# Patient Record
Sex: FEMALE | Race: WHITE | NOT HISPANIC OR LATINO | Employment: OTHER | URBAN - METROPOLITAN AREA
[De-identification: names, ages, dates, MRNs, and addresses within clinical notes are randomized per-mention and may not be internally consistent; named-entity substitution may affect disease eponyms.]

---

## 2018-12-14 ENCOUNTER — TRANSCRIBE ORDERS (OUTPATIENT)
Dept: LAB | Facility: HOSPITAL | Age: 83
End: 2018-12-14

## 2018-12-14 ENCOUNTER — APPOINTMENT (OUTPATIENT)
Dept: LAB | Facility: HOSPITAL | Age: 83
End: 2018-12-14

## 2018-12-14 DIAGNOSIS — B96.89 BACTERIAL CHOLANGITIS: ICD-10-CM

## 2018-12-14 DIAGNOSIS — Z79.52 LONG TERM CURRENT USE OF SYSTEMIC STEROIDS: ICD-10-CM

## 2018-12-14 DIAGNOSIS — K83.09 BACTERIAL CHOLANGITIS: ICD-10-CM

## 2018-12-14 DIAGNOSIS — L03.115 CELLULITIS OF RIGHT FOOT: ICD-10-CM

## 2018-12-14 DIAGNOSIS — S80.821D: Primary | ICD-10-CM

## 2018-12-14 DIAGNOSIS — M05.79 SEROPOSITIVE RHEUMATOID ARTHRITIS OF MULTIPLE SITES (HCC): ICD-10-CM

## 2018-12-14 DIAGNOSIS — B95.7 CHRONIC GRANULOMATOUS INFECTION DUE MOSTLY TO STAPHYLOCOCCUS AUREUS: ICD-10-CM

## 2018-12-14 DIAGNOSIS — N39.0 URINARY TRACT INFECTION WITHOUT HEMATURIA, SITE UNSPECIFIED: ICD-10-CM

## 2018-12-14 DIAGNOSIS — B96.29 NON-SHIGA TOXIN-PRODUCING E. COLI: ICD-10-CM

## 2018-12-14 DIAGNOSIS — R60.0 LOCALIZED EDEMA: ICD-10-CM

## 2018-12-14 DIAGNOSIS — M35.3 POLYMYALGIA RHEUMATICA (HCC): ICD-10-CM

## 2018-12-14 LAB
ALBUMIN SERPL-MCNC: 3.99 G/DL (ref 3.2–4.8)
ALBUMIN/GLOB SERPL: 2.1 G/DL (ref 1.5–2.5)
ALP SERPL-CCNC: 60 U/L (ref 25–100)
ALT SERPL W P-5'-P-CCNC: 66 U/L (ref 7–40)
ANION GAP SERPL CALCULATED.3IONS-SCNC: 8 MMOL/L (ref 3–11)
AST SERPL-CCNC: 47 U/L (ref 0–33)
BASOPHILS # BLD AUTO: 0.02 10*3/MM3 (ref 0–0.2)
BASOPHILS NFR BLD AUTO: 0.3 % (ref 0–1)
BILIRUB SERPL-MCNC: 0.5 MG/DL (ref 0.3–1.2)
BUN BLD-MCNC: 16 MG/DL (ref 9–23)
BUN/CREAT SERPL: 18.8 (ref 7–25)
CALCIUM SPEC-SCNC: 9 MG/DL (ref 8.7–10.4)
CHLORIDE SERPL-SCNC: 106 MMOL/L (ref 99–109)
CO2 SERPL-SCNC: 27 MMOL/L (ref 20–31)
CREAT BLD-MCNC: 0.85 MG/DL (ref 0.6–1.3)
DEPRECATED RDW RBC AUTO: 52.9 FL (ref 37–54)
EOSINOPHIL # BLD AUTO: 0.09 10*3/MM3 (ref 0–0.3)
EOSINOPHIL NFR BLD AUTO: 1.4 % (ref 0–3)
ERYTHROCYTE [DISTWIDTH] IN BLOOD BY AUTOMATED COUNT: 15.2 % (ref 11.3–14.5)
GFR SERPL CREATININE-BSD FRML MDRD: 64 ML/MIN/1.73
GLOBULIN UR ELPH-MCNC: 1.9 GM/DL
GLUCOSE BLD-MCNC: 116 MG/DL (ref 70–100)
HCT VFR BLD AUTO: 42.8 % (ref 34.5–44)
HGB BLD-MCNC: 13.7 G/DL (ref 11.5–15.5)
IMM GRANULOCYTES # BLD: 0.01 10*3/MM3 (ref 0–0.03)
IMM GRANULOCYTES NFR BLD: 0.2 % (ref 0–0.6)
LYMPHOCYTES # BLD AUTO: 0.81 10*3/MM3 (ref 0.6–4.8)
LYMPHOCYTES NFR BLD AUTO: 12.3 % (ref 24–44)
MCH RBC QN AUTO: 30.3 PG (ref 27–31)
MCHC RBC AUTO-ENTMCNC: 32 G/DL (ref 32–36)
MCV RBC AUTO: 94.7 FL (ref 80–99)
MONOCYTES # BLD AUTO: 0.85 10*3/MM3 (ref 0–1)
MONOCYTES NFR BLD AUTO: 12.9 % (ref 0–12)
NEUTROPHILS # BLD AUTO: 4.81 10*3/MM3 (ref 1.5–8.3)
NEUTROPHILS NFR BLD AUTO: 73.1 % (ref 41–71)
PLATELET # BLD AUTO: 154 10*3/MM3 (ref 150–450)
PMV BLD AUTO: 9.7 FL (ref 6–12)
POTASSIUM BLD-SCNC: 3.4 MMOL/L (ref 3.5–5.5)
PROT SERPL-MCNC: 5.9 G/DL (ref 5.7–8.2)
RBC # BLD AUTO: 4.52 10*6/MM3 (ref 3.89–5.14)
SODIUM BLD-SCNC: 141 MMOL/L (ref 132–146)
WBC NRBC COR # BLD: 6.58 10*3/MM3 (ref 3.5–10.8)

## 2018-12-14 PROCEDURE — 36415 COLL VENOUS BLD VENIPUNCTURE: CPT | Performed by: INTERNAL MEDICINE

## 2018-12-14 PROCEDURE — 85025 COMPLETE CBC W/AUTO DIFF WBC: CPT | Performed by: INTERNAL MEDICINE

## 2018-12-14 PROCEDURE — 80053 COMPREHEN METABOLIC PANEL: CPT | Performed by: INTERNAL MEDICINE

## 2018-12-17 ENCOUNTER — LAB (OUTPATIENT)
Dept: LAB | Facility: HOSPITAL | Age: 83
End: 2018-12-17

## 2018-12-17 ENCOUNTER — TRANSCRIBE ORDERS (OUTPATIENT)
Dept: LAB | Facility: HOSPITAL | Age: 83
End: 2018-12-17

## 2018-12-17 DIAGNOSIS — L03.115 CELLULITIS OF RIGHT FOOT: ICD-10-CM

## 2018-12-17 DIAGNOSIS — B96.89 BACTERIAL CHOLANGITIS: ICD-10-CM

## 2018-12-17 DIAGNOSIS — K83.09 BACTERIAL CHOLANGITIS: ICD-10-CM

## 2018-12-17 DIAGNOSIS — R60.0 LOCALIZED EDEMA: ICD-10-CM

## 2018-12-17 DIAGNOSIS — S80.821D: Primary | ICD-10-CM

## 2018-12-17 DIAGNOSIS — S80.821D: ICD-10-CM

## 2018-12-17 LAB
ALBUMIN SERPL-MCNC: 4.15 G/DL (ref 3.2–4.8)
ALBUMIN/GLOB SERPL: 2 G/DL (ref 1.5–2.5)
ALP SERPL-CCNC: 66 U/L (ref 25–100)
ALT SERPL W P-5'-P-CCNC: 42 U/L (ref 7–40)
ANION GAP SERPL CALCULATED.3IONS-SCNC: 10 MMOL/L (ref 3–11)
AST SERPL-CCNC: 23 U/L (ref 0–33)
BASOPHILS # BLD AUTO: 0.03 10*3/MM3 (ref 0–0.2)
BASOPHILS NFR BLD AUTO: 0.5 % (ref 0–1)
BILIRUB SERPL-MCNC: 0.5 MG/DL (ref 0.3–1.2)
BUN BLD-MCNC: 20 MG/DL (ref 9–23)
BUN/CREAT SERPL: 21.3 (ref 7–25)
CALCIUM SPEC-SCNC: 9.1 MG/DL (ref 8.7–10.4)
CHLORIDE SERPL-SCNC: 107 MMOL/L (ref 99–109)
CO2 SERPL-SCNC: 24 MMOL/L (ref 20–31)
CREAT BLD-MCNC: 0.94 MG/DL (ref 0.6–1.3)
CRP SERPL-MCNC: 0.99 MG/DL (ref 0–1)
DEPRECATED RDW RBC AUTO: 53.1 FL (ref 37–54)
EOSINOPHIL # BLD AUTO: 0.11 10*3/MM3 (ref 0–0.3)
EOSINOPHIL NFR BLD AUTO: 1.7 % (ref 0–3)
ERYTHROCYTE [DISTWIDTH] IN BLOOD BY AUTOMATED COUNT: 15.1 % (ref 11.3–14.5)
ERYTHROCYTE [SEDIMENTATION RATE] IN BLOOD: <1 MM/HR (ref 0–30)
GFR SERPL CREATININE-BSD FRML MDRD: 57 ML/MIN/1.73
GLOBULIN UR ELPH-MCNC: 2.1 GM/DL
GLUCOSE BLD-MCNC: 114 MG/DL (ref 70–100)
HCT VFR BLD AUTO: 44.3 % (ref 34.5–44)
HGB BLD-MCNC: 14.3 G/DL (ref 11.5–15.5)
IMM GRANULOCYTES # BLD: 0.03 10*3/MM3 (ref 0–0.03)
IMM GRANULOCYTES NFR BLD: 0.5 % (ref 0–0.6)
LYMPHOCYTES # BLD AUTO: 1.36 10*3/MM3 (ref 0.6–4.8)
LYMPHOCYTES NFR BLD AUTO: 20.8 % (ref 24–44)
MCH RBC QN AUTO: 31 PG (ref 27–31)
MCHC RBC AUTO-ENTMCNC: 32.3 G/DL (ref 32–36)
MCV RBC AUTO: 96.1 FL (ref 80–99)
MONOCYTES # BLD AUTO: 0.95 10*3/MM3 (ref 0–1)
MONOCYTES NFR BLD AUTO: 14.5 % (ref 0–12)
NEUTROPHILS # BLD AUTO: 4.08 10*3/MM3 (ref 1.5–8.3)
NEUTROPHILS NFR BLD AUTO: 62.5 % (ref 41–71)
PLATELET # BLD AUTO: 113 10*3/MM3 (ref 150–450)
PMV BLD AUTO: 11.2 FL (ref 6–12)
POTASSIUM BLD-SCNC: 3 MMOL/L (ref 3.5–5.5)
PROT SERPL-MCNC: 6.2 G/DL (ref 5.7–8.2)
RBC # BLD AUTO: 4.61 10*6/MM3 (ref 3.89–5.14)
SODIUM BLD-SCNC: 141 MMOL/L (ref 132–146)
WBC NRBC COR # BLD: 6.53 10*3/MM3 (ref 3.5–10.8)

## 2018-12-17 PROCEDURE — 86140 C-REACTIVE PROTEIN: CPT

## 2018-12-17 PROCEDURE — 85652 RBC SED RATE AUTOMATED: CPT

## 2018-12-17 PROCEDURE — 80053 COMPREHEN METABOLIC PANEL: CPT

## 2018-12-17 PROCEDURE — 85025 COMPLETE CBC W/AUTO DIFF WBC: CPT

## 2018-12-17 PROCEDURE — 36415 COLL VENOUS BLD VENIPUNCTURE: CPT

## 2019-09-14 ENCOUNTER — APPOINTMENT (OUTPATIENT)
Dept: CT IMAGING | Facility: HOSPITAL | Age: 84
End: 2019-09-14

## 2019-09-14 ENCOUNTER — HOSPITAL ENCOUNTER (EMERGENCY)
Facility: HOSPITAL | Age: 84
Discharge: HOME | End: 2019-09-14
Attending: EMERGENCY MEDICINE | Admitting: EMERGENCY MEDICINE

## 2019-09-14 VITALS
RESPIRATION RATE: 18 BRPM | DIASTOLIC BLOOD PRESSURE: 82 MMHG | HEART RATE: 84 BPM | HEIGHT: 62 IN | SYSTOLIC BLOOD PRESSURE: 172 MMHG | WEIGHT: 130 LBS | BODY MASS INDEX: 23.92 KG/M2 | OXYGEN SATURATION: 92 % | TEMPERATURE: 98.7 F
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
## 2019-09-14 DIAGNOSIS — S51.012A SKIN TEAR OF LEFT ELBOW WITHOUT COMPLICATION, INITIAL ENCOUNTER: ICD-10-CM

## 2019-09-14 DIAGNOSIS — S32.000A LUMBAR COMPRESSION FRACTURE, CLOSED, INITIAL ENCOUNTER (HCC): ICD-10-CM

## 2019-09-14 DIAGNOSIS — S22.000A THORACIC COMPRESSION FRACTURE, CLOSED, INITIAL ENCOUNTER (HCC): Primary | ICD-10-CM

## 2019-09-14 PROCEDURE — 90715 TDAP VACCINE 7 YRS/> IM: CPT | Performed by: PHYSICIAN ASSISTANT

## 2019-09-14 PROCEDURE — 25010000002 TDAP 5-2.5-18.5 LF-MCG/0.5 SUSPENSION: Performed by: PHYSICIAN ASSISTANT

## 2019-09-14 PROCEDURE — 72128 CT CHEST SPINE W/O DYE: CPT

## 2019-09-14 PROCEDURE — 72131 CT LUMBAR SPINE W/O DYE: CPT

## 2019-09-14 PROCEDURE — 90471 IMMUNIZATION ADMIN: CPT

## 2019-09-14 PROCEDURE — 90471 IMMUNIZATION ADMIN: CPT | Performed by: PHYSICIAN ASSISTANT

## 2019-09-14 PROCEDURE — 99284 EMERGENCY DEPT VISIT MOD MDM: CPT

## 2019-09-14 RX ORDER — LATANOPROST 50 UG/ML
1 SOLUTION/ DROPS OPHTHALMIC NIGHTLY
COMMUNITY

## 2019-09-14 RX ORDER — LIDOCAINE 50 MG/G
1 PATCH TOPICAL
Status: DISCONTINUED | OUTPATIENT
Start: 2019-09-14 | End: 2019-09-14 | Stop reason: HOSPADM

## 2019-09-14 RX ORDER — CALCITONIN SALMON 200 [IU]/.09ML
1 SPRAY, METERED NASAL DAILY
Qty: 3.7 ML | Refills: 0 | Status: SHIPPED | OUTPATIENT
Start: 2019-09-14

## 2019-09-14 RX ORDER — FOLIC ACID 1 MG/1
1 TABLET ORAL DAILY
COMMUNITY

## 2019-09-14 RX ORDER — GLIMEPIRIDE 2 MG/1
1 TABLET ORAL 2 TIMES DAILY
COMMUNITY

## 2019-09-14 RX ORDER — HYDROCODONE BITARTRATE AND ACETAMINOPHEN 5; 325 MG/1; MG/1
0.5 TABLET ORAL ONCE
Status: COMPLETED | OUTPATIENT
Start: 2019-09-14 | End: 2019-09-14

## 2019-09-14 RX ORDER — HYDROCODONE BITARTRATE AND ACETAMINOPHEN 5; 325 MG/1; MG/1
1 TABLET ORAL ONCE
Status: DISCONTINUED | OUTPATIENT
Start: 2019-09-14 | End: 2019-09-14

## 2019-09-14 RX ORDER — HYDROCODONE BITARTRATE AND ACETAMINOPHEN 5; 325 MG/1; MG/1
.5-1 TABLET ORAL EVERY 6 HOURS PRN
Qty: 10 TABLET | Refills: 0 | Status: SHIPPED | OUTPATIENT
Start: 2019-09-14 | End: 2019-11-04

## 2019-09-14 RX ORDER — PREDNISONE 2.5 MG
5 TABLET ORAL DAILY
COMMUNITY

## 2019-09-14 RX ORDER — LIDOCAINE 50 MG/G
1 PATCH TOPICAL EVERY 24 HOURS
Qty: 30 PATCH | Refills: 0 | Status: ON HOLD | OUTPATIENT
Start: 2019-09-14 | End: 2019-10-10

## 2019-09-14 RX ORDER — CEPHALEXIN 500 MG/1
500 CAPSULE ORAL 3 TIMES DAILY
Qty: 21 CAPSULE | Refills: 0 | Status: ON HOLD | OUTPATIENT
Start: 2019-09-14 | End: 2019-10-10

## 2019-09-14 RX ADMIN — HYDROCODONE BITARTRATE AND ACETAMINOPHEN 0.5 TABLET: 5; 325 TABLET ORAL at 12:18

## 2019-09-14 RX ADMIN — LIDOCAINE 1 PATCH: 50 PATCH TOPICAL at 12:21

## 2019-09-14 RX ADMIN — TETANUS TOXOID, REDUCED DIPHTHERIA TOXOID AND ACELLULAR PERTUSSIS VACCINE, ADSORBED 0.5 ML: 5; 2.5; 8; 8; 2.5 SUSPENSION INTRAMUSCULAR at 12:25

## 2019-09-14 NOTE — DISCHARGE INSTRUCTIONS
Follow-up with Dr. Willson for neurosurgical consult and evaluation for kyphoplasty.  Follow-up with your primary care provider for recheck on Monday.  Keep wounds clean and dry, observing for signs of infection.  Return to the emergency department immediately for any change or worsening of symptoms.    CONTROLLED SUBSTANCE(S) EDUCATION  Controlled Substances have been prescribed by your provider to treat your medical condition and associated symptoms. Although Controlled Substances can be effective in relieving your pain or other symptoms, they may also cause serious adverse effects. It is important that you understand how to safely and appropriately take these medications.  Proper Use  1. Carefully following instructions for use, including timing of doses, whether to take the  medication with or without food, and any foods or other medications to avoid while taking the medication;  2. If you have low or impaired vision you should wear glasses when taking the medication and not take the medication in the dark;  3. You should read the prescription container label each time to confirm the dosage;  4. You should never use the medication after the expiration date;  5. You must never share the medication with others;  6. You must not take the medication with alcohol or other sedatives;  7. You should not take the medication to help you sleep;  8. You should never break, crush or chew the medication;  9. If you have been prescribed a skin patch (transdermal), external heat, fever and exertion can increase the absorption of these products, leading to potentially fatal overdose;  10. You should immediately contact the physician?s office to report any adverse reaction and,  11. It is illegal to share, sell or give away Controlled Substances.  Driving and Work Safety  1. Controlled Substances may cause sleepiness, clouded thinking, decreased concentration, slower reflexes, or incoordination, all of which may create a danger to  you and others when driving or operating certain type of machinery;  2. Avoid, if possible, driving or engaging in other potentially dangerous work or other activities, for a specific period of time until the initial effects of the Controlled Substances no longer create such dangers; and,  3. Ingesting other substances, such as alcohol, benzodiazepines or some cold remedies, at the same time you are taking the Controlled Substances prescribed or dispensed may increase cognitive and motor impairment.  Pregnancy  If you are pregnant or nursing a baby, avoid using Controlled Substances, or use them on a minimal basis in strict accordance with your provider?s instructions.  Potential for Overdose and Response  1. The use of Controlled Substances creates a risk of respiratory depression, which may result in serious harm or death. You and others should be watchful for the following warning signs of overmedication:  ? intoxicated behavior, such as confusion, slurred speech, or stumbling;  ? feeling dizzy or faint;  ? acting very drowsy or groggy;  ? unusual snoring, gasping, or snorting during sleep;  ? and/or difficulty waking up from sleep or difficulty in staying awake.  2. Immediately call ?911? or an emergency service upon you or your caregivers observing or experiencing any of the following conditions:  ? you cannot be aroused or waken, or are unable to talk after being awakened;  ? you have shortness of breath, slow or light breathing, or stopped breathing;  ? gurgling noises coming from your mouth or throat;  ? your body is limp, seems lifeless;  ? your face is pale or clammy;  ? your fingernails or lips are turning purple or blue; and/or  ? your heartbeat is slow, unusual or stopped  Safe Storage of Controlled Substances  1. If your Controlled Substances are not stored in a safe manner there is a potential that  partners, family members or others may improperly obtain your Controlled Substances;  2. Always keep  the Controlled Substances in the original container;  3. Store Controlled Substances in a locked cabinet or other secure storage unit, that is cool, dry and out of direct sunlight, such as:  ? an existing safe;  ? a cut-proof travel bag;  ? a portable lock box designed for travel; or,  ? a locking medical box.  4. Do not store Controlled Substances in:  ? an unlocked medicine cabinet;  ? in your car; or,  ? in a refrigerator or freezer unless specifically recommended by the prescriber or  pharmacist; and  5. Immediately notify your provider if any Controlled Substances prescribed or dispensed by the provider are stolen or improperly taken by another individual.  Proper Disposal  1. It is important to safely and appropriately dispose of unused Controlled Substances that had been prescribed or dispensed by your provider;  2. Promptly dispose of unused Controlled Substances after the expiration date of the  prescription or after you no longer require the Controlled Substances to treat your medical condition;  3. In order to safely dispose of Controlled Substances, you should turn in the unused Controlled Substances as part of an approved governmental drug take-back program. The [place] Office of Drug Control Policy has a listing of [place] Permanent Drug Disposal Locations at http://www.odcp.[place].gov - click on the [place] Prescriptions Drug Drop Map and Location on the left side of the page.  4. You should not flush Controlled Substances down the toilet; and,  5. You should personally remove any identifying information, including the prescription number, from an empty Controlled Substance container and then properly dispose of the empty container.  CONSENT FOR TREATMENT WITH CONTROLLED SUBSTANCE(S)  (This is for an initial prescription)  1. Controlled Substances  Controlled Substances are prescribed to treat a variety of conditions, including the relief  of chronic pain, to provide stimulation, promote weight loss,  and treat mood disorders.  Pain relief is an important medical reason to take Controlled Substances.  Controlled Substances are drugs or chemical substances whose possession and use are  regulated under the Controlled Substances Act. The law requires that patient are informed of the risks, benefits, and alternatives of taking Controlled Substances.  2. Adverse Effects  As with any medication, there are risks and adverse effects associated with the use of  Controlled Substances. Common adverse effects of pain medicines could include, but are not limited to: sedation or sleepiness, nausea, vomiting, constipation, pruritus (itching), confusion, respiratory depression, and urinary retention. Some of these effects may make it unsafe for you to drive a vehicle, operate heavy machinery, or perform other tasks that require concentration and coordination. Excessive use of these Controlled Substances can lead to profound sedation, respiratory depression, coma, and/or death. Regarding stimulants, adverse effects could include, but are not limited to: drug dependency, neuropsychiatric symptoms such as psychosis and chance, weight loss, cardiovascular events such as heart attack and stroke, insomnia, hypertension, and agitation. Any questions you have regarding the Controlled Substance(s) should be discussed with the prescribing provider.  3. Physical Dependence, Tolerance, and Addiction  Although uncommon when used for their clinical indications, both pain relievers and  stimulants can cause physical dependence, tolerance, and/or addiction when used for a  prolonged period. Maintenance therapy with these Controlled Substances can cause  physical dependence. This means that if these medications are abruptly stopped, or  decreased significantly over a short period of time, a patient may experience withdrawal  symptoms such as: nervousness, irritability, insomnia, sweating, abdominal cramping,  nausea, vomiting, and diarrhea.  Tolerance occurs when the effects of these Controlled  Substances are decreased over a period of prolonged use making it necessary to increase the dosage. Physical dependence and tolerance are different than addiction. Addiction is a complex disease characterized by compulsive craving or seeking and use of a substance despite its extreme negatives on a person. The risk of addiction may be increased in a patient with a history of alcoholism or other addiction.  4. Alternatives  Controlled Substances are routinely prescribed to treat moderate to severe pain or other  medical conditions. Other medicines are available to treat these conditions that are not  associated with tolerance or addiction, however, are associated with a lower level of pain  relief or stimulation. It may also be an alternative to not take any medicine to treat these  conditions, or to use alternative modalities, other than medicine to treat these conditions.  I voluntarily consent to the receipt of the above-named Controlled Substance(s) as prescribed by my provider. I have been informed of the benefits, risks, and alternatives to taking these medications. I acknowledge that I have read and understood all of the information above and I have had the opportunity to ask questions and have them answered to my satisfaction.

## 2019-09-14 NOTE — ED PROVIDER NOTES
Subjective   84-year-old female presents emergency part with back pain and skin tears.  Mechanical fall 2 days ago, seen by PCP yesterday x-rays taken at that time suggestive of T9 fracture.  Patient presents here for further evaluation.  No chest pain or shortness of breath no abdominal pain no pelvis hip lower extremity pain, no saddle anesthesia bowel or bladder dysfunction.  Patient has skin tears to her left knee and left elbow as well.  No fevers chills or sweats.  Tetanus immunization not up-to-date.            Review of Systems   Constitutional: Negative for chills, diaphoresis and fever.   HENT: Negative for congestion and sore throat.    Respiratory: Negative for cough, choking, chest tightness, shortness of breath and wheezing.    Cardiovascular: Negative for chest pain and leg swelling.   Gastrointestinal: Negative for abdominal distention, abdominal pain, anal bleeding, blood in stool, constipation, diarrhea, nausea and vomiting.   Genitourinary: Negative for difficulty urinating, dysuria, flank pain, frequency, hematuria and urgency.   Musculoskeletal: Positive for back pain. Negative for neck pain and neck stiffness.   Skin: Positive for wound.   Neurological: Negative for dizziness, seizures, syncope, speech difficulty, weakness, light-headedness, numbness and headaches.   Psychiatric/Behavioral: Negative for confusion.   All other systems reviewed and are negative.      Past Medical History:   Diagnosis Date   • Arthritis    • Fracture     T9   • Glaucoma    • Hypertension    • Macular degeneration        Allergies   Allergen Reactions   • Cardizem [Diltiazem Hcl] Other (See Comments)     unknown   • Metoprolol Other (See Comments)     unknown   • Adhesive Tape Rash       History reviewed. No pertinent surgical history.    History reviewed. No pertinent family history.    Social History     Socioeconomic History   • Marital status:      Spouse name: Not on file   • Number of children: Not on  file   • Years of education: Not on file   • Highest education level: Not on file   Tobacco Use   • Smoking status: Former Smoker   • Smokeless tobacco: Never Used   Substance and Sexual Activity   • Alcohol use: Yes     Alcohol/week: 1.2 oz     Types: 2 Glasses of wine per week   • Drug use: No   • Sexual activity: Defer           Objective   Physical Exam   Constitutional: She is oriented to person, place, and time. She appears well-developed and well-nourished. No distress.   HENT:   Head: Normocephalic and atraumatic.   Right Ear: External ear normal.   Left Ear: External ear normal.   Nose: Nose normal.   Mouth/Throat: Oropharynx is clear and moist. No oropharyngeal exudate.   Eyes: Conjunctivae and EOM are normal. Pupils are equal, round, and reactive to light. Right eye exhibits no discharge. Left eye exhibits no discharge. No scleral icterus.   Neck: Normal range of motion. Neck supple. No JVD present. No tracheal deviation present. No thyromegaly present.   Cardiovascular: Normal rate, regular rhythm, normal heart sounds and intact distal pulses. Exam reveals no gallop and no friction rub.   No murmur heard.  Pulmonary/Chest: Effort normal and breath sounds normal. No stridor. No respiratory distress.   Abdominal: Soft. Bowel sounds are normal. She exhibits no distension. There is no tenderness. There is no rebound and no guarding.   Musculoskeletal: Normal range of motion. She exhibits tenderness. She exhibits no edema or deformity.   Mild tenderness around T10-L1, no external sign of injury.  Moderate thoracic kyphosis noted.  No SI tenderness, hips and pelvis stable nontender.   Neurological: She is alert and oriented to person, place, and time. No cranial nerve deficit. She exhibits normal muscle tone. Coordination normal.   Skin: Skin is warm and dry. No rash noted. She is not diaphoretic. No erythema. No pallor.   Left elbow with full-thickness skin tear approximate 1 cm, no surrounding erythema, no  bony tenderness or effusion.  Able to pronate supinate forearm, elbow with full range of motion.    Superficial skin tear to left knee anteriorly.   Psychiatric: She has a normal mood and affect. Her behavior is normal. Judgment and thought content normal.   Nursing note and vitals reviewed.      Procedures       Procedure note-wound care-left knee was cleansed with Hibiclens and sterile saline, skin flap was closely approximated and tacked down with Dermabond and Steri-Strips.  Sterile dressing was applied.  Left elbow laceration at the lateral condylar aspect was cleansed with Hibiclens irrigated with normal saline, loosely approximated with Steri-Strips and sterile dressing was applied.  Pre-and post procedure neurovascular checks were normal.  Patient tolerated procedures well.    ED Course  ED Course as of Sep 14 1633   Sat Sep 14, 2019   1328 IMPRESSION:  1. Thoracic spine demonstrates T10 superior cortical irregularity with  sclerotic band formation adjacent to the endplate of approximately 25%  height loss compression fracture deformity of indeterminate acuity  without retropulsion or significant spinal canal stenosis/posterior  element involvement.  2. Lumbar spine evaluation demonstrates subtle cortical irregularity  with 25% or less height loss of the L1 vertebral body concerning for  subtle compression fracture deformity without retropulsion or spinal  canal stenosis and no posterior element involvement.  [TG]   1632 Patient does take low-dose methotrexate and prednisone.  Will cover skin wounds with legs, close observation.  Refer to neurosurgery for evaluation of compression fractures.  Hydrocodone 5 mg 1/2 to 1 tablet every 6 hours as needed pain, Miacalcin nasal spray daily.  Observe for any change worsening symptoms and return immediately if any change or worsening.  Patient and family verbalized understanding are agreeable to plan.  [TG]      ED Course User Index  [TG] Brett Hartley PA-C                   MDM    Final diagnoses:   Thoracic compression fracture, closed, initial encounter (CMS/formerly [facility])   Lumbar compression fracture, closed, initial encounter (CMS/formerly [facility])   Skin tear of left elbow without complication, initial encounter   Fall, initial encounter              Brett Hartley PA-C  09/14/19 9934

## 2019-09-16 ENCOUNTER — OFFICE VISIT (OUTPATIENT)
Dept: NEUROSURGERY | Facility: CLINIC | Age: 84
End: 2019-09-16

## 2019-09-16 VITALS
SYSTOLIC BLOOD PRESSURE: 98 MMHG | TEMPERATURE: 98.5 F | WEIGHT: 135 LBS | HEIGHT: 62 IN | DIASTOLIC BLOOD PRESSURE: 58 MMHG | BODY MASS INDEX: 24.84 KG/M2

## 2019-09-16 DIAGNOSIS — S22.000A CLOSED COMPRESSION FRACTURE OF THORACIC VERTEBRA, INITIAL ENCOUNTER (HCC): ICD-10-CM

## 2019-09-16 DIAGNOSIS — S32.010A CLOSED COMPRESSION FRACTURE OF FIRST LUMBAR VERTEBRA, INITIAL ENCOUNTER: Primary | ICD-10-CM

## 2019-09-16 PROCEDURE — 99213 OFFICE O/P EST LOW 20 MIN: CPT | Performed by: PHYSICIAN ASSISTANT

## 2019-09-16 NOTE — PROGRESS NOTES
Subjective     Chief Complaint: Fall, back pain    Patient ID: Michelle Hoff is a 84 y.o. female seen for consultation today at the request of  Alma Casanova MD    History of Present Illness    This is a very nice 84-year-old woman who had a mechanical fall at home 5 days ago after tripping while going down a step.  Patient had immediate onset of mid back pain.  She presented to the ED 2 days ago and CT of the thoracic and lumbar spine was performed which demonstrated compression fractures of T10 and L1.  Patient was given Norco 5 in the ED and has been taking this 2-3 times a day and a lidocaine patch which provides some relief of pain.  Her pain is improved at rest and exacerbated by any movement.  Pain is located along the paraspinal musculature at the mid back.  She denies any radiation of pain into lower extremities or around the mid back.  Denies numbness, weakness, or bowel or bladder dysfunction.    The following portions of the patient's history were reviewed and updated as appropriate: allergies, current medications, past family history, past medical history, past social history, past surgical history and problem list.    Family history: No family history on file.     Past Medical History:   Diagnosis Date   • Arthritis    • Fracture     T9   • Glaucoma    • Hypertension    • Macular degeneration        Social history:   Social History     Socioeconomic History   • Marital status:      Spouse name: Not on file   • Number of children: Not on file   • Years of education: Not on file   • Highest education level: Not on file   Tobacco Use   • Smoking status: Former Smoker   • Smokeless tobacco: Never Used   Substance and Sexual Activity   • Alcohol use: Yes     Alcohol/week: 1.2 oz     Types: 2 Glasses of wine per week   • Drug use: No   • Sexual activity: Defer       Review of Systems   Constitutional: Positive for activity change. Negative for appetite change, chills, diaphoresis, fatigue, fever  "and unexpected weight change.   HENT: Negative for congestion, dental problem, drooling, ear discharge, ear pain, facial swelling, hearing loss, mouth sores, nosebleeds, postnasal drip, rhinorrhea, sinus pressure, sneezing, sore throat, tinnitus, trouble swallowing and voice change.    Eyes: Negative for photophobia, pain, discharge, redness, itching and visual disturbance.   Respiratory: Negative for apnea, cough, choking, chest tightness, shortness of breath, wheezing and stridor.    Cardiovascular: Negative for chest pain, palpitations and leg swelling.   Gastrointestinal: Negative for abdominal distention, abdominal pain, anal bleeding, blood in stool, constipation, diarrhea, nausea, rectal pain and vomiting.   Endocrine: Negative for cold intolerance, heat intolerance, polydipsia, polyphagia and polyuria.   Genitourinary: Negative for decreased urine volume, difficulty urinating, dysuria, enuresis, flank pain, frequency, genital sores, hematuria and urgency.   Musculoskeletal: Positive for back pain. Negative for arthralgias, gait problem, joint swelling, myalgias, neck pain and neck stiffness.   Skin: Negative for color change, pallor, rash and wound.   Allergic/Immunologic: Negative for environmental allergies, food allergies and immunocompromised state.   Neurological: Negative for dizziness, tremors, seizures, syncope, facial asymmetry, speech difficulty, weakness, light-headedness, numbness and headaches.   Hematological: Negative for adenopathy. Does not bruise/bleed easily.   Psychiatric/Behavioral: Negative for agitation, behavioral problems, confusion, decreased concentration, dysphoric mood, hallucinations, self-injury, sleep disturbance and suicidal ideas. The patient is not nervous/anxious and is not hyperactive.        Objective   Blood pressure 98/58, temperature 98.5 °F (36.9 °C), temperature source Temporal, height 157.5 cm (62\"), weight 61.2 kg (135 lb).  Body mass index is 24.69 " kg/m².    Physical Exam   Constitutional: She is oriented to person, place, and time. She appears well-developed and well-nourished. No distress.   HENT:   Head: Normocephalic and atraumatic.   Neck: Normal range of motion.   Pulmonary/Chest: Effort normal.   Musculoskeletal:        Thoracic back: She exhibits no bony tenderness, no swelling, no edema and no deformity.        Lumbar back: She exhibits no tenderness and no bony tenderness.        Back:    Neurological: She is alert and oriented to person, place, and time. She has normal strength. No sensory deficit. She exhibits normal muscle tone. Gait abnormal. GCS eye subscore is 4. GCS verbal subscore is 5. GCS motor subscore is 6.   Patient is ambulating with a wheelchair today  She is able to transition from a seated to standing position independently  Strength is intact in upper and lower extremities to direct testing  Sensation is intact to light touch throughout  Clonus is not elicited.  No Lopez sign   Skin: Skin is warm and dry. She is not diaphoretic. No erythema.   Psychiatric: She has a normal mood and affect.         Assessment/Plan     Ms. Hoff is an 84-year-old woman who was seen today for evaluation of T10 and L1 compression fracture noted on CT scan.  She states her pain is 5/10 today and has improved some since evaluation in the ED 2 days ago.  Dr. Horton came into the room and examined the patient.  We have recommended a course of conservatism to include Home Health physical therapy, Tylenol/NSAIDs, and heat/ice.  MRI has been ordered to assess for acuity of the fractures.  Patient will follow-up with Dr. Horton in 3 weeks.  At that time she has not had any improvement in her symptoms, she may be a candidate for vertebroplasty.  Patient and her daughter were instructed to call our office with any new or worsening symptoms.     Michelle was seen today for back pain.    Diagnoses and all orders for this visit:    Closed compression fracture of  first lumbar vertebra, initial encounter (CMS/[facility])  -     MRI Lumbar Spine Without Contrast; Future  -     Ambulatory Referral to Home Health    Closed compression fracture of thoracic vertebra, initial encounter (CMS/[facility])  -     MRI Lumbar Spine Without Contrast; Future  -     Ambulatory Referral to Home Health        Return in about 3 weeks (around 10/7/2019), or if symptoms worsen or fail to improve.             Katheryn Hill PA-C

## 2019-09-26 ENCOUNTER — TELEPHONE (OUTPATIENT)
Dept: NEUROSURGERY | Facility: CLINIC | Age: 84
End: 2019-09-26

## 2019-09-26 DIAGNOSIS — S22.000A CLOSED COMPRESSION FRACTURE OF THORACIC VERTEBRA, INITIAL ENCOUNTER (HCC): Primary | ICD-10-CM

## 2019-09-26 DIAGNOSIS — S32.010A CLOSED COMPRESSION FRACTURE OF FIRST LUMBAR VERTEBRA, INITIAL ENCOUNTER: ICD-10-CM

## 2019-10-07 ENCOUNTER — PREP FOR SURGERY (OUTPATIENT)
Dept: OTHER | Facility: HOSPITAL | Age: 84
End: 2019-10-07

## 2019-10-07 ENCOUNTER — TELEPHONE (OUTPATIENT)
Dept: NEUROSURGERY | Facility: CLINIC | Age: 84
End: 2019-10-07

## 2019-10-07 ENCOUNTER — OFFICE VISIT (OUTPATIENT)
Dept: NEUROSURGERY | Facility: CLINIC | Age: 84
End: 2019-10-07

## 2019-10-07 ENCOUNTER — HOSPITAL ENCOUNTER (OUTPATIENT)
Dept: MRI IMAGING | Facility: HOSPITAL | Age: 84
Discharge: HOME | End: 2019-10-07
Admitting: PHYSICIAN ASSISTANT

## 2019-10-07 VITALS
HEIGHT: 62 IN | TEMPERATURE: 98 F | DIASTOLIC BLOOD PRESSURE: 86 MMHG | SYSTOLIC BLOOD PRESSURE: 144 MMHG | WEIGHT: 135 LBS | BODY MASS INDEX: 24.84 KG/M2

## 2019-10-07 DIAGNOSIS — S22.000A CLOSED COMPRESSION FRACTURE OF THORACIC VERTEBRA, INITIAL ENCOUNTER (HCC): ICD-10-CM

## 2019-10-07 DIAGNOSIS — M48.54XA PATHOLOGICAL COMPRESSION FRACTURE OF THORACIC VERTEBRA, INITIAL ENCOUNTER (HCC): ICD-10-CM

## 2019-10-07 DIAGNOSIS — M48.56XA PATHOLOGICAL COMPRESSION FRACTURE OF LUMBAR VERTEBRA, INITIAL ENCOUNTER (HCC): Primary | ICD-10-CM

## 2019-10-07 DIAGNOSIS — S32.010A CLOSED COMPRESSION FRACTURE OF FIRST LUMBAR VERTEBRA, INITIAL ENCOUNTER: ICD-10-CM

## 2019-10-07 PROCEDURE — 99213 OFFICE O/P EST LOW 20 MIN: CPT | Performed by: NEUROLOGICAL SURGERY

## 2019-10-07 PROCEDURE — 72148 MRI LUMBAR SPINE W/O DYE: CPT

## 2019-10-07 RX ORDER — CEFAZOLIN SODIUM 2 G/100ML
2 INJECTION, SOLUTION INTRAVENOUS ONCE
Status: CANCELLED | OUTPATIENT
Start: 2019-10-07 | End: 2019-10-07

## 2019-10-07 NOTE — PROGRESS NOTES
NAME: TOBIAS DOMINGUEZ   DOS: 10/7/2019  : 1935  PCP: Alma Casanova MD    Chief Complaint:    Chief Complaint   Patient presents with   • Back Pain       History of Present Illness:  84 y.o. female   I saw this very pleasant woman in neurosurgical follow-up she is a lady well-known to me have operated on her dollar    She has ongoing pain after her fall she has compression fractures at T10 and L1 she denies weakness    PMHX  Allergies:  Allergies   Allergen Reactions   • Cardizem [Diltiazem Hcl] Other (See Comments)     unknown   • Metoprolol Other (See Comments)     unknown   • Adhesive Tape Rash     Medications    Current Outpatient Medications:   •  atenolol (TENORMIN) 25 MG tablet, Take 1 tablet by mouth Every Evening., Disp: 30 tablet, Rfl: 11  •  calcitonin, salmon, (MIACALCIN) 200 UNIT/ACT nasal spray, Use 1 spray daily Alternating Nostrils., Disp: 3.7 mL, Rfl: 0  •  cephalexin (KEFLEX) 500 MG capsule, Take 1 capsule by mouth 3 (Three) Times a Day., Disp: 21 capsule, Rfl: 0  •  folic acid (FOLVITE) 1 MG tablet, Take 1 mg by mouth Daily., Disp: , Rfl:   •  HYDROcodone-acetaminophen (NORCO) 5-325 MG per tablet, Take 0.5-1 tablets by mouth Every 6 (Six) Hours As Needed for Severe Pain ., Disp: 10 tablet, Rfl: 0  •  latanoprost (XALATAN) 0.005 % ophthalmic solution, Administer 1 drop to both eyes Every Night., Disp: , Rfl:   •  lidocaine (LIDODERM) 5 %, Place 1 patch on the skin as directed by provider Daily. Remove & Discard patch within 12 hours or as directed by MD, Disp: 30 patch, Rfl: 0  •  LISINOPRIL PO, Take  by mouth., Disp: , Rfl:   •  methotrexate 2.5 MG tablet, Take 7.5 mg by mouth 1 (One) Time Per Week. q monday, Disp: , Rfl:   •  predniSONE (DELTASONE) 2.5 MG tablet, Take 5 mg by mouth Daily., Disp: , Rfl:   •  timolol (TIMOPTIC) 0.25 % ophthalmic solution, Administer 1 drop to both eyes 2 (Two) Times a Day., Disp: , Rfl:   Past Medical History:  Past Medical History:   Diagnosis Date    • Arthritis    • Fracture     T9   • Glaucoma    • Hypertension    • Macular degeneration      Past Surgical History:  No past surgical history on file.  Social Hx:  Social History     Tobacco Use   • Smoking status: Former Smoker   • Smokeless tobacco: Never Used   Substance Use Topics   • Alcohol use: Yes     Alcohol/week: 1.2 oz     Types: 2 Glasses of wine per week   • Drug use: No     Family Hx:  No family history on file.  Review of Systems:        Review of Systems   Constitutional: Positive for activity change. Negative for appetite change, chills, diaphoresis, fatigue, fever and unexpected weight change.   HENT: Negative for congestion, dental problem, drooling, ear discharge, ear pain, facial swelling, hearing loss, mouth sores, nosebleeds, postnasal drip, rhinorrhea, sinus pressure, sneezing, sore throat, tinnitus, trouble swallowing and voice change.    Eyes: Negative for photophobia, pain, discharge, redness, itching and visual disturbance.   Respiratory: Negative for apnea, cough, choking, chest tightness, shortness of breath, wheezing and stridor.    Cardiovascular: Negative for chest pain, palpitations and leg swelling.   Gastrointestinal: Negative for abdominal distention, abdominal pain, anal bleeding, blood in stool, constipation, diarrhea, nausea, rectal pain and vomiting.   Endocrine: Negative for cold intolerance, heat intolerance, polydipsia, polyphagia and polyuria.   Genitourinary: Negative for decreased urine volume, difficulty urinating, dysuria, enuresis, flank pain, frequency, genital sores, hematuria and urgency.   Musculoskeletal: Positive for back pain. Negative for arthralgias, gait problem, joint swelling, myalgias, neck pain and neck stiffness.   Skin: Negative for color change, pallor, rash and wound.   Allergic/Immunologic: Negative for environmental allergies, food allergies and immunocompromised state.   Neurological: Negative for dizziness, tremors, seizures, syncope, facial  asymmetry, speech difficulty, weakness, light-headedness, numbness and headaches.   Hematological: Negative for adenopathy. Does not bruise/bleed easily.   Psychiatric/Behavioral: Negative for agitation, behavioral problems, confusion, decreased concentration, dysphoric mood, hallucinations, self-injury, sleep disturbance and suicidal ideas. The patient is not nervous/anxious and is not hyperactive.         I have reviewed this note template and all pertinent parts of the review of systems social, family history, surgical history and medication list  Physical Examination:  Vitals:    10/07/19 1415   BP: 144/86   Temp: 98 °F (36.7 °C)      General Appearance:   Well developed, well nourished, well groomed, alert, and cooperative.  Neurological examination:  Neurologic Exam  She is awake alert has good cognition    Has tenderness in her thoracolumbar area    Has good strength lower extremities her gait is wide-based but stable    Review of Imaging/DATA:  MRI was reviewed demonstrates L1 and T10 compression fracture diagnoses/Plan:    Ms. Hoff is a 84 y.o. female     She presents with refractory pain status post L1 superior endplate and T10 compression fractures plan will be for kyphoplasty at L1 T10

## 2019-10-08 PROBLEM — M48.56XA PATHOLOGIC COMPRESSION FRACTURE OF LUMBAR VERTEBRA (HCC): Status: ACTIVE | Noted: 2019-10-08

## 2019-10-09 ENCOUNTER — ANESTHESIA EVENT (OUTPATIENT)
Dept: PERIOP | Facility: HOSPITAL | Age: 84
End: 2019-10-09

## 2019-10-09 ENCOUNTER — APPOINTMENT (OUTPATIENT)
Dept: PREADMISSION TESTING | Facility: HOSPITAL | Age: 84
End: 2019-10-09

## 2019-10-09 VITALS — HEIGHT: 62 IN | BODY MASS INDEX: 24.66 KG/M2 | WEIGHT: 134 LBS

## 2019-10-09 DIAGNOSIS — M48.56XA PATHOLOGICAL COMPRESSION FRACTURE OF LUMBAR VERTEBRA, INITIAL ENCOUNTER (HCC): ICD-10-CM

## 2019-10-09 LAB
ANION GAP SERPL CALCULATED.3IONS-SCNC: 12 MMOL/L (ref 5–15)
BUN BLD-MCNC: 18 MG/DL (ref 8–23)
BUN/CREAT SERPL: 19.4 (ref 7–25)
CALCIUM SPEC-SCNC: 9.6 MG/DL (ref 8.6–10.5)
CHLORIDE SERPL-SCNC: 105 MMOL/L (ref 98–107)
CO2 SERPL-SCNC: 24 MMOL/L (ref 22–29)
CREAT BLD-MCNC: 0.93 MG/DL (ref 0.57–1)
DEPRECATED RDW RBC AUTO: 50.7 FL (ref 37–54)
ERYTHROCYTE [DISTWIDTH] IN BLOOD BY AUTOMATED COUNT: 14.6 % (ref 12.3–15.4)
GFR SERPL CREATININE-BSD FRML MDRD: 57 ML/MIN/1.73
GLUCOSE BLD-MCNC: 128 MG/DL (ref 65–99)
HCT VFR BLD AUTO: 44.5 % (ref 34–46.6)
HGB BLD-MCNC: 14.3 G/DL (ref 12–15.9)
MCH RBC QN AUTO: 30.6 PG (ref 26.6–33)
MCHC RBC AUTO-ENTMCNC: 32.1 G/DL (ref 31.5–35.7)
MCV RBC AUTO: 95.1 FL (ref 79–97)
MRSA DNA SPEC QL NAA+PROBE: NEGATIVE
PLATELET # BLD AUTO: 178 10*3/MM3 (ref 140–450)
PMV BLD AUTO: 9.7 FL (ref 6–12)
POTASSIUM BLD-SCNC: 3.9 MMOL/L (ref 3.5–5.2)
RBC # BLD AUTO: 4.68 10*6/MM3 (ref 3.77–5.28)
SODIUM BLD-SCNC: 141 MMOL/L (ref 136–145)
WBC NRBC COR # BLD: 6.37 10*3/MM3 (ref 3.4–10.8)

## 2019-10-09 PROCEDURE — 93010 ELECTROCARDIOGRAM REPORT: CPT | Performed by: INTERNAL MEDICINE

## 2019-10-09 PROCEDURE — 80048 BASIC METABOLIC PNL TOTAL CA: CPT | Performed by: NEUROLOGICAL SURGERY

## 2019-10-09 PROCEDURE — 85027 COMPLETE CBC AUTOMATED: CPT | Performed by: NEUROLOGICAL SURGERY

## 2019-10-09 PROCEDURE — 36415 COLL VENOUS BLD VENIPUNCTURE: CPT

## 2019-10-09 PROCEDURE — 87641 MR-STAPH DNA AMP PROBE: CPT | Performed by: NEUROLOGICAL SURGERY

## 2019-10-09 PROCEDURE — 93005 ELECTROCARDIOGRAM TRACING: CPT

## 2019-10-09 RX ORDER — MEDROXYPROGESTERONE ACETATE 5 MG/1
5 TABLET ORAL DAILY
COMMUNITY

## 2019-10-09 RX ORDER — NITROFURANTOIN MACROCRYSTALS 50 MG/1
50 CAPSULE ORAL NIGHTLY
COMMUNITY
End: 2019-11-04

## 2019-10-09 RX ORDER — FAMOTIDINE 10 MG/ML
20 INJECTION, SOLUTION INTRAVENOUS ONCE
Status: CANCELLED | OUTPATIENT
Start: 2019-10-09 | End: 2019-10-09

## 2019-10-09 NOTE — PAT
Notified BRANDAN Stephens with Dr. Horton of skin tear on left lower leg.  Per [facility] they will evaluate in the am.

## 2019-10-10 ENCOUNTER — HOSPITAL ENCOUNTER (OUTPATIENT)
Facility: HOSPITAL | Age: 84
Setting detail: HOSPITAL OUTPATIENT SURGERY
Discharge: HOME | End: 2019-10-10
Attending: NEUROLOGICAL SURGERY | Admitting: NEUROLOGICAL SURGERY

## 2019-10-10 ENCOUNTER — ANESTHESIA (OUTPATIENT)
Dept: PERIOP | Facility: HOSPITAL | Age: 84
End: 2019-10-10

## 2019-10-10 ENCOUNTER — APPOINTMENT (OUTPATIENT)
Dept: GENERAL RADIOLOGY | Facility: HOSPITAL | Age: 84
End: 2019-10-10

## 2019-10-10 ENCOUNTER — TELEPHONE (OUTPATIENT)
Dept: NEUROSURGERY | Facility: CLINIC | Age: 84
End: 2019-10-10

## 2019-10-10 VITALS
HEART RATE: 81 BPM | DIASTOLIC BLOOD PRESSURE: 71 MMHG | TEMPERATURE: 97.8 F | RESPIRATION RATE: 17 BRPM | SYSTOLIC BLOOD PRESSURE: 147 MMHG | OXYGEN SATURATION: 93 %

## 2019-10-10 DIAGNOSIS — M48.56XA PATHOLOGICAL COMPRESSION FRACTURE OF LUMBAR VERTEBRA, INITIAL ENCOUNTER (HCC): ICD-10-CM

## 2019-10-10 DIAGNOSIS — Z98.890 S/P KYPHOPLASTY: Primary | ICD-10-CM

## 2019-10-10 PROCEDURE — 25010000002 NEOSTIGMINE 10 MG/10ML SOLUTION: Performed by: NURSE ANESTHETIST, CERTIFIED REGISTERED

## 2019-10-10 PROCEDURE — 88304 TISSUE EXAM BY PATHOLOGIST: CPT | Performed by: NEUROLOGICAL SURGERY

## 2019-10-10 PROCEDURE — C1713 ANCHOR/SCREW BN/BN,TIS/BN: HCPCS | Performed by: NEUROLOGICAL SURGERY

## 2019-10-10 PROCEDURE — 0 IOPAMIDOL 41 % SOLUTION: Performed by: NEUROLOGICAL SURGERY

## 2019-10-10 PROCEDURE — 22513 PERQ VERTEBRAL AUGMENTATION: CPT | Performed by: NEUROLOGICAL SURGERY

## 2019-10-10 PROCEDURE — 20251 BIOPSY VRT BDY OPEN LMBR/CRV: CPT | Performed by: NEUROLOGICAL SURGERY

## 2019-10-10 PROCEDURE — 25010000002 ONDANSETRON PER 1 MG: Performed by: NURSE ANESTHETIST, CERTIFIED REGISTERED

## 2019-10-10 PROCEDURE — 25010000002 PROPOFOL 10 MG/ML EMULSION: Performed by: NURSE ANESTHETIST, CERTIFIED REGISTERED

## 2019-10-10 PROCEDURE — 25010000002 HYDRALAZINE PER 20 MG: Performed by: NURSE ANESTHETIST, CERTIFIED REGISTERED

## 2019-10-10 PROCEDURE — 25010000002 FENTANYL CITRATE (PF) 100 MCG/2ML SOLUTION: Performed by: NURSE ANESTHETIST, CERTIFIED REGISTERED

## 2019-10-10 PROCEDURE — 25010000003 CEFAZOLIN IN DEXTROSE 2-4 GM/100ML-% SOLUTION: Performed by: NEUROLOGICAL SURGERY

## 2019-10-10 PROCEDURE — 22510 PERQ CERVICOTHORACIC INJECT: CPT

## 2019-10-10 PROCEDURE — 22515 PERQ VERTEBRAL AUGMENTATION: CPT | Performed by: NEUROLOGICAL SURGERY

## 2019-10-10 PROCEDURE — 25010000002 DEXAMETHASONE PER 1 MG: Performed by: NURSE ANESTHETIST, CERTIFIED REGISTERED

## 2019-10-10 PROCEDURE — 88311 DECALCIFY TISSUE: CPT | Performed by: NEUROLOGICAL SURGERY

## 2019-10-10 DEVICE — BONE CEMENT CX01B KYPHON XPEDE W MXR US
Type: IMPLANTABLE DEVICE | Site: SPINE LUMBAR | Status: FUNCTIONAL
Brand: KYPHON® XPEDE™ BONE CEMENT AND KYPHON® MIXER PACK

## 2019-10-10 RX ORDER — HYDRALAZINE HYDROCHLORIDE 20 MG/ML
5 INJECTION INTRAMUSCULAR; INTRAVENOUS
Status: DISCONTINUED | OUTPATIENT
Start: 2019-10-10 | End: 2019-10-10 | Stop reason: HOSPADM

## 2019-10-10 RX ORDER — IPRATROPIUM BROMIDE AND ALBUTEROL SULFATE 2.5; .5 MG/3ML; MG/3ML
3 SOLUTION RESPIRATORY (INHALATION) ONCE AS NEEDED
Status: DISCONTINUED | OUTPATIENT
Start: 2019-10-10 | End: 2019-10-10 | Stop reason: HOSPADM

## 2019-10-10 RX ORDER — HYDROMORPHONE HYDROCHLORIDE 1 MG/ML
0.5 INJECTION, SOLUTION INTRAMUSCULAR; INTRAVENOUS; SUBCUTANEOUS
Status: DISCONTINUED | OUTPATIENT
Start: 2019-10-10 | End: 2019-10-10 | Stop reason: HOSPADM

## 2019-10-10 RX ORDER — FAMOTIDINE 20 MG/1
20 TABLET, FILM COATED ORAL ONCE
Status: COMPLETED | OUTPATIENT
Start: 2019-10-10 | End: 2019-10-10

## 2019-10-10 RX ORDER — DEXAMETHASONE SODIUM PHOSPHATE 4 MG/ML
INJECTION, SOLUTION INTRA-ARTICULAR; INTRALESIONAL; INTRAMUSCULAR; INTRAVENOUS; SOFT TISSUE AS NEEDED
Status: DISCONTINUED | OUTPATIENT
Start: 2019-10-10 | End: 2019-10-10 | Stop reason: SURG

## 2019-10-10 RX ORDER — ONDANSETRON 2 MG/ML
4 INJECTION INTRAMUSCULAR; INTRAVENOUS ONCE AS NEEDED
Status: DISCONTINUED | OUTPATIENT
Start: 2019-10-10 | End: 2019-10-10 | Stop reason: HOSPADM

## 2019-10-10 RX ORDER — NALOXONE HCL 0.4 MG/ML
0.4 VIAL (ML) INJECTION AS NEEDED
Status: DISCONTINUED | OUTPATIENT
Start: 2019-10-10 | End: 2019-10-10 | Stop reason: HOSPADM

## 2019-10-10 RX ORDER — CEFAZOLIN SODIUM 2 G/100ML
2 INJECTION, SOLUTION INTRAVENOUS ONCE
Status: COMPLETED | OUTPATIENT
Start: 2019-10-10 | End: 2019-10-10

## 2019-10-10 RX ORDER — LIDOCAINE HYDROCHLORIDE 10 MG/ML
0.5 INJECTION, SOLUTION EPIDURAL; INFILTRATION; INTRACAUDAL; PERINEURAL ONCE AS NEEDED
Status: COMPLETED | OUTPATIENT
Start: 2019-10-10 | End: 2019-10-10

## 2019-10-10 RX ORDER — SODIUM CHLORIDE 0.9 % (FLUSH) 0.9 %
3-10 SYRINGE (ML) INJECTION AS NEEDED
Status: DISCONTINUED | OUTPATIENT
Start: 2019-10-10 | End: 2019-10-10 | Stop reason: HOSPADM

## 2019-10-10 RX ORDER — ATRACURIUM BESYLATE 10 MG/ML
INJECTION, SOLUTION INTRAVENOUS AS NEEDED
Status: DISCONTINUED | OUTPATIENT
Start: 2019-10-10 | End: 2019-10-10 | Stop reason: SURG

## 2019-10-10 RX ORDER — FENTANYL CITRATE 50 UG/ML
50 INJECTION, SOLUTION INTRAMUSCULAR; INTRAVENOUS
Status: DISCONTINUED | OUTPATIENT
Start: 2019-10-10 | End: 2019-10-10 | Stop reason: HOSPADM

## 2019-10-10 RX ORDER — SODIUM CHLORIDE 0.9 % (FLUSH) 0.9 %
3 SYRINGE (ML) INJECTION EVERY 12 HOURS SCHEDULED
Status: DISCONTINUED | OUTPATIENT
Start: 2019-10-10 | End: 2019-10-10 | Stop reason: HOSPADM

## 2019-10-10 RX ORDER — HYDROCODONE BITARTRATE AND ACETAMINOPHEN 5; 325 MG/1; MG/1
1 TABLET ORAL ONCE AS NEEDED
Status: DISCONTINUED | OUTPATIENT
Start: 2019-10-10 | End: 2019-10-10 | Stop reason: HOSPADM

## 2019-10-10 RX ORDER — SODIUM CHLORIDE, SODIUM LACTATE, POTASSIUM CHLORIDE, CALCIUM CHLORIDE 600; 310; 30; 20 MG/100ML; MG/100ML; MG/100ML; MG/100ML
9 INJECTION, SOLUTION INTRAVENOUS CONTINUOUS
Status: DISCONTINUED | OUTPATIENT
Start: 2019-10-10 | End: 2019-10-10 | Stop reason: HOSPADM

## 2019-10-10 RX ORDER — NEOSTIGMINE METHYLSULFATE 1 MG/ML
INJECTION, SOLUTION INTRAVENOUS AS NEEDED
Status: DISCONTINUED | OUTPATIENT
Start: 2019-10-10 | End: 2019-10-10 | Stop reason: SURG

## 2019-10-10 RX ORDER — LIDOCAINE HYDROCHLORIDE AND EPINEPHRINE 5; 5 MG/ML; UG/ML
INJECTION, SOLUTION INFILTRATION; PERINEURAL AS NEEDED
Status: DISCONTINUED | OUTPATIENT
Start: 2019-10-10 | End: 2019-10-10 | Stop reason: HOSPADM

## 2019-10-10 RX ORDER — LIDOCAINE HYDROCHLORIDE 10 MG/ML
INJECTION, SOLUTION EPIDURAL; INFILTRATION; INTRACAUDAL; PERINEURAL AS NEEDED
Status: DISCONTINUED | OUTPATIENT
Start: 2019-10-10 | End: 2019-10-10 | Stop reason: SURG

## 2019-10-10 RX ORDER — GLYCOPYRROLATE 0.2 MG/ML
INJECTION INTRAMUSCULAR; INTRAVENOUS AS NEEDED
Status: DISCONTINUED | OUTPATIENT
Start: 2019-10-10 | End: 2019-10-10 | Stop reason: SURG

## 2019-10-10 RX ORDER — PROMETHAZINE HYDROCHLORIDE 25 MG/ML
6.25 INJECTION, SOLUTION INTRAMUSCULAR; INTRAVENOUS ONCE AS NEEDED
Status: DISCONTINUED | OUTPATIENT
Start: 2019-10-10 | End: 2019-10-10 | Stop reason: HOSPADM

## 2019-10-10 RX ORDER — PROMETHAZINE HYDROCHLORIDE 25 MG/1
25 TABLET ORAL ONCE AS NEEDED
Status: DISCONTINUED | OUTPATIENT
Start: 2019-10-10 | End: 2019-10-10 | Stop reason: HOSPADM

## 2019-10-10 RX ORDER — MEPERIDINE HYDROCHLORIDE 25 MG/ML
12.5 INJECTION INTRAMUSCULAR; INTRAVENOUS; SUBCUTANEOUS
Status: DISCONTINUED | OUTPATIENT
Start: 2019-10-10 | End: 2019-10-10 | Stop reason: HOSPADM

## 2019-10-10 RX ORDER — PROPOFOL 10 MG/ML
VIAL (ML) INTRAVENOUS AS NEEDED
Status: DISCONTINUED | OUTPATIENT
Start: 2019-10-10 | End: 2019-10-10 | Stop reason: SURG

## 2019-10-10 RX ORDER — PROMETHAZINE HYDROCHLORIDE 25 MG/1
25 SUPPOSITORY RECTAL ONCE AS NEEDED
Status: DISCONTINUED | OUTPATIENT
Start: 2019-10-10 | End: 2019-10-10 | Stop reason: HOSPADM

## 2019-10-10 RX ORDER — BUPIVACAINE HYDROCHLORIDE 2.5 MG/ML
INJECTION, SOLUTION EPIDURAL; INFILTRATION; INTRACAUDAL AS NEEDED
Status: DISCONTINUED | OUTPATIENT
Start: 2019-10-10 | End: 2019-10-10 | Stop reason: HOSPADM

## 2019-10-10 RX ORDER — ONDANSETRON 2 MG/ML
INJECTION INTRAMUSCULAR; INTRAVENOUS AS NEEDED
Status: DISCONTINUED | OUTPATIENT
Start: 2019-10-10 | End: 2019-10-10 | Stop reason: SURG

## 2019-10-10 RX ADMIN — HYDRALAZINE HYDROCHLORIDE 5 MG: 20 INJECTION INTRAMUSCULAR; INTRAVENOUS at 09:37

## 2019-10-10 RX ADMIN — ATRACURIUM BESYLATE 30 MG: 10 INJECTION, SOLUTION INTRAVENOUS at 07:41

## 2019-10-10 RX ADMIN — DEXAMETHASONE SODIUM PHOSPHATE 4 MG: 4 INJECTION, SOLUTION INTRAMUSCULAR; INTRAVENOUS at 07:41

## 2019-10-10 RX ADMIN — ONDANSETRON 4 MG: 2 INJECTION INTRAMUSCULAR; INTRAVENOUS at 08:39

## 2019-10-10 RX ADMIN — CEFAZOLIN SODIUM 2 G: 2 INJECTION, SOLUTION INTRAVENOUS at 07:35

## 2019-10-10 RX ADMIN — FENTANYL CITRATE 50 MCG: 50 INJECTION INTRAMUSCULAR; INTRAVENOUS at 09:35

## 2019-10-10 RX ADMIN — PROPOFOL 50 MG: 10 INJECTION, EMULSION INTRAVENOUS at 08:06

## 2019-10-10 RX ADMIN — NEOSTIGMINE METHYLSULFATE 1 MG: 1 INJECTION, SOLUTION INTRAVENOUS at 08:41

## 2019-10-10 RX ADMIN — LIDOCAINE HYDROCHLORIDE 0.2 ML: 10 INJECTION, SOLUTION EPIDURAL; INFILTRATION; INTRACAUDAL; PERINEURAL at 07:07

## 2019-10-10 RX ADMIN — SODIUM CHLORIDE, POTASSIUM CHLORIDE, SODIUM LACTATE AND CALCIUM CHLORIDE 9 ML/HR: 600; 310; 30; 20 INJECTION, SOLUTION INTRAVENOUS at 07:07

## 2019-10-10 RX ADMIN — PROPOFOL 150 MG: 10 INJECTION, EMULSION INTRAVENOUS at 07:39

## 2019-10-10 RX ADMIN — EPHEDRINE SULFATE 10 MG: 50 INJECTION INTRAMUSCULAR; INTRAVENOUS; SUBCUTANEOUS at 08:15

## 2019-10-10 RX ADMIN — LIDOCAINE HYDROCHLORIDE 50 MG: 10 INJECTION, SOLUTION EPIDURAL; INFILTRATION; INTRACAUDAL; PERINEURAL at 07:39

## 2019-10-10 RX ADMIN — FAMOTIDINE 20 MG: 20 TABLET ORAL at 07:21

## 2019-10-10 RX ADMIN — GLYCOPYRROLATE 0.2 MG: 0.2 INJECTION, SOLUTION INTRAMUSCULAR; INTRAVENOUS at 08:41

## 2019-10-10 NOTE — ANESTHESIA POSTPROCEDURE EVALUATION
Patient: Michelle Hoff    Procedure Summary     Date:  10/10/19 Room / Location:   [facility]    Anesthesia Start:  0735 Anesthesia Stop:  0859    Procedure:  KYPHOPLASTY  T10, L1 (N/A Spine Lumbar) Diagnosis:       Pathological compression fracture of lumbar vertebra, initial encounter (CMS/[facility])      (Pathological compression fracture of lumbar vertebra, initial encounter (CMS/[facility]) [M48.56XA])    Surgeon:  Fausto Horton MD Provider:  Judd Vann MD    Anesthesia Type:  general ASA Status:  3          Anesthesia Type: general  Last vitals  BP   (!) 188/92 (10/10/19 0859)   Temp   97.6 °F (36.4 °C) (10/10/19 0859)   Pulse   87 (10/10/19 0859)   Resp   20 (10/10/19 0859)     SpO2   100 % (10/10/19 0859)     Post Anesthesia Care and Evaluation    Patient location during evaluation: PACU  Patient participation: complete - patient participated  Level of consciousness: awake and alert  Pain score: 0  Pain management: adequate  Airway patency: patent  Anesthetic complications: No anesthetic complications  PONV Status: none  Cardiovascular status: hemodynamically stable and acceptable  Respiratory status: nonlabored ventilation, acceptable and nasal cannula  Hydration status: acceptable    Comments: AAOx3, VSS, no anesthesia complications at this time. Lower front incisors are unchanged and no damage caused throughout procedure

## 2019-10-10 NOTE — ANESTHESIA PREPROCEDURE EVALUATION
Anesthesia Evaluation                  Airway   Mallampati: II  TM distance: >3 FB  Neck ROM: full  Small opening and Possible difficult intubation  Dental - normal exam     Pulmonary - normal exam   (+) a smoker Former,   Cardiovascular - normal exam    (+) hypertension,       Neuro/Psych  GI/Hepatic/Renal/Endo      Musculoskeletal     (+) back pain,   Abdominal  - normal exam    Bowel sounds: normal.   Substance History      OB/GYN          Other   (+) arthritis       Other Comment: GLAUCOMA, MACULAR DEGENERATION                Anesthesia Plan    ASA 3     general   (GLIDESCOPE)  intravenous induction   Anesthetic plan, all risks, benefits, and alternatives have been provided, discussed and informed consent has been obtained with: patient.    Plan discussed with CRNA.

## 2019-10-10 NOTE — INTERVAL H&P NOTE
Pre-Op H&P (See Recent Office Note Attached for Full H&P)    Chief complaint: Back pain    Review of Systems:  General ROS:  no fever, chills, rashes, No change since last office visit  Cardiovascular ROS: no chest pain or dyspnea on exertion.  History of SVT on Atenolol  Respiratory ROS: no cough, shortness of breath, or wheezing  MS:  History of PMR on chronic steroids    Meds:    No current facility-administered medications on file prior to encounter.      Current Outpatient Medications on File Prior to Encounter   Medication Sig Dispense Refill   • atenolol (TENORMIN) 25 MG tablet Take 1 tablet by mouth Every Evening. 30 tablet 11   • calcitonin, salmon, (MIACALCIN) 200 UNIT/ACT nasal spray Use 1 spray daily Alternating Nostrils. 3.7 mL 0   • cephalexin (KEFLEX) 500 MG capsule Take 1 capsule by mouth 3 (Three) Times a Day. 21 capsule 0   • folic acid (FOLVITE) 1 MG tablet Take 1 mg by mouth Daily.     • HYDROcodone-acetaminophen (NORCO) 5-325 MG per tablet Take 0.5-1 tablets by mouth Every 6 (Six) Hours As Needed for Severe Pain . 10 tablet 0   • latanoprost (XALATAN) 0.005 % ophthalmic solution Administer 1 drop to both eyes Every Night.     • lidocaine (LIDODERM) 5 % Place 1 patch on the skin as directed by provider Daily. Remove & Discard patch within 12 hours or as directed by MD 30 patch 0   • LISINOPRIL PO Take 10 mg by mouth 2 (Two) Times a Day As Needed.     • methotrexate 2.5 MG tablet Take 7.5 mg by mouth 1 (One) Time Per Week. q monday     • predniSONE (DELTASONE) 2.5 MG tablet Take 5 mg by mouth Daily.     • timolol (TIMOPTIC) 0.25 % ophthalmic solution Administer 1 drop to both eyes 2 (Two) Times a Day.         Vital Signs:  Afebrile HR 57 O2 97% /92 (Per Anesthesia)  Physical Exam:    CV:  S1S2 regular rate and rhythm, no murmur               Resp:  Clear to auscultation; respirations regular, even and unlabored               Skin:  Left anterior calf with approx 5 cm skin tear with dried  bloody drainage with no other drainage noted.  No erythema/edema/induration.  (Covered with mepilex)    Results Review:     Lab Results   Component Value Date    WBC 6.37 10/09/2019    HGB 14.3 10/09/2019    HCT 44.5 10/09/2019    MCV 95.1 10/09/2019     10/09/2019    NEUTROABS 4.08 12/17/2018    GLUCOSE 128 (H) 10/09/2019    BUN 18 10/09/2019    CREATININE 0.93 10/09/2019    EGFRIFNONA 57 (L) 10/09/2019     10/09/2019    K 3.9 10/09/2019     10/09/2019    CO2 24.0 10/09/2019    CALCIUM 9.6 10/09/2019    ALBUMIN 4.15 12/17/2018    AST 23 12/17/2018    ALT 42 (H) 12/17/2018    BILITOT 0.5 12/17/2018        I reviewed the patient's new clinical results.    Cancer Staging (if applicable)  Cancer Patient: __ yes _x_no __unknown; If yes, clinical stage T:__ N:__M:__, stage group or __N/A    Assessment/Plan:    She presents with refractory pain status post L1 superior endplate and T10 compression fractures plan will be for kyphoplasty at L1 and T10    Abbi Mead, APRN  10/10/2019   6:56 AM

## 2019-10-10 NOTE — ANESTHESIA PROCEDURE NOTES
Airway  Urgency: elective    Date/Time: 10/10/2019 7:44 AM  Airway not difficult    General Information and Staff    Patient location during procedure: OR  CRNA: Kristy Harrell CRNA    Indications and Patient Condition  Indications for airway management: airway protection    Preoxygenated: yes  MILS not maintained throughout  Mask difficulty assessment: 1 - vent by mask    Final Airway Details  Final airway type: endotracheal airway      Successful airway: ETT  Cuffed: yes   Successful intubation technique: direct laryngoscopy  Facilitating devices/methods: intubating stylet and anterior pressure/BURP  Endotracheal tube insertion site: oral  Blade: Katherine  Blade size: 3  ETT size (mm): 6.5  Cormack-Lehane Classification: grade IIa - partial view of glottis  Placement verified by: chest auscultation and capnometry   Measured from: gums  ETT/EBT to gums (cm): 22  Number of attempts at approach: 1  Assessment: lips, teeth, and gum same as pre-op and atraumatic intubation    Additional Comments  Lower front incisors very loose prior to intubation, no trauma during instrumentation

## 2019-10-10 NOTE — TELEPHONE ENCOUNTER
Per Dr. Horton.   Pt needs to be set up with home health physical therapy.     Please call and get this set up asap.

## 2019-10-11 LAB
CYTO UR: NORMAL
LAB AP CASE REPORT: NORMAL
LAB AP CLINICAL INFORMATION: NORMAL
PATH REPORT.FINAL DX SPEC: NORMAL
PATH REPORT.GROSS SPEC: NORMAL

## 2019-10-14 ENCOUNTER — TELEPHONE (OUTPATIENT)
Dept: NEUROSURGERY | Facility: CLINIC | Age: 84
End: 2019-10-14

## 2019-10-14 NOTE — TELEPHONE ENCOUNTER
Silvia (pt's daughter) left me a message needing to get her mother a post op appt scheduled.     Please let Silvia know I got her message, and if you could, schedule pt's post op appt with EB.    Thanks!

## 2019-10-14 NOTE — OP NOTE
Preoperative diagnosis  Intractable pain T10, L1 compression fractures      Postoperative diagnosis is same      Procedures performed  1.  T10 osteoporotic pathologic compression fracture kyphoplasty,   2.  L1 osteoporotic pathologic compression fracture  3.  Biopsy of above levels      Gen. endotracheal anesthesia    No immediate complications-pathology sent    Procedure in detail    After formal written consent was obtained she was taken to the operating room prepped and draped in the usual sterile manner after this point in time.  Local infiltration with Marcaine was performed lidocaine and epinephrine as well using biplanar stereotactic fluoroscopy Jamshidi needles were docked on the L1, biopsy was performed, pedicle installation of cement commenced an excellent fill of the L1 fracture line was noted.  L1 this was performed after T6 vertebral body augmentation with inflation of balloons  at 250 PSI.     At this point time attention was turned to the T10 fracture level where again transpedicular access was obtained with biopsy followed by vertebral body augmentation and subsequent instillation of the kyphoplasty cement.    Steri-Strips were placed across the incisions lateral fluoroscopic guidance can show excellent penetration of the cement.    Findings were discussed with the family.

## 2019-10-15 NOTE — TELEPHONE ENCOUNTER
Yes- pt needs to be set up with home health PT- Please see previous telephone encounter, it looks like pt is already with home health.       I will see if tsering wants to RX something different.

## 2019-10-15 NOTE — TELEPHONE ENCOUNTER
"Daughter states that the patient is still in some pain, and is wondering if there is anything she can take that isn't a to \"heavy narcotic\". She is also wanting to know if she can start PT? If so can you please pend an order that I can fax?  "

## 2019-10-16 RX ORDER — TRAMADOL HYDROCHLORIDE 50 MG/1
50 TABLET ORAL EVERY 8 HOURS PRN
Qty: 30 TABLET | Refills: 0 | Status: SHIPPED | OUTPATIENT
Start: 2019-10-16

## 2019-10-17 NOTE — TELEPHONE ENCOUNTER
Spoke with Silvia (pt's daughter) she was not aware that any medication had been sent to the pharmacy. I apologized for any inconvenience. She will p/u meds tomorrow.     Silvia will call me with an update tomorrow or Monday.

## 2019-11-04 ENCOUNTER — OFFICE VISIT (OUTPATIENT)
Dept: NEUROSURGERY | Facility: CLINIC | Age: 84
End: 2019-11-04

## 2019-11-04 VITALS
BODY MASS INDEX: 24.29 KG/M2 | WEIGHT: 132 LBS | DIASTOLIC BLOOD PRESSURE: 64 MMHG | TEMPERATURE: 97.5 F | SYSTOLIC BLOOD PRESSURE: 112 MMHG | HEIGHT: 62 IN

## 2019-11-04 DIAGNOSIS — M48.56XD PATHOLOGICAL COMPRESSION FRACTURE OF LUMBAR VERTEBRA WITH ROUTINE HEALING, SUBSEQUENT ENCOUNTER: Primary | ICD-10-CM

## 2019-11-04 PROCEDURE — 99024 POSTOP FOLLOW-UP VISIT: CPT | Performed by: PHYSICIAN ASSISTANT

## 2019-11-04 RX ORDER — MELOXICAM 15 MG/1
15 TABLET ORAL DAILY
Qty: 60 TABLET | Refills: 6 | Status: SHIPPED | OUTPATIENT
Start: 2019-11-04

## 2019-11-04 NOTE — PROGRESS NOTES
Subjective   Patient ID: Michelle Hoff is a 84 y.o. female is here today for follow-up for wound check.    HPI:      Patient is a very nice 84-year-old female who is related to another patient of ours Sarah Cowden.  Silvia's daughter works in the pharmacy here at [facility].  Patient is well-known to us for undergoing a T10 and L1 kyphoplasty.  Labs came back negative for tumor.    Patient states that the sharp/exquisite pain that she was in prior to surgery is much better and she has mainly soreness at this point.    When she is working with PT it sometimes flares up her pain but she does have arthritis and has not been taking any anti-inflammatories.  I will prescribe Mobic and see her back in about 6 weeks to check on her progress.    The following portions of the patient's history were reviewed and updated as appropriate: allergies, current medications, past family history, past medical history, past social history, past surgical history and problem list.    Review of Systems   Constitutional: Positive for activity change. Negative for appetite change, chills, diaphoresis, fatigue, fever and unexpected weight change.   HENT: Negative for congestion, dental problem, drooling, ear discharge, ear pain, facial swelling, hearing loss, mouth sores, nosebleeds, postnasal drip, rhinorrhea, sinus pressure, sneezing, sore throat, tinnitus, trouble swallowing and voice change.    Eyes: Negative for photophobia, pain, discharge, redness, itching and visual disturbance.   Respiratory: Negative for apnea, cough, choking, chest tightness, shortness of breath, wheezing and stridor.    Cardiovascular: Negative for chest pain, palpitations and leg swelling.   Gastrointestinal: Negative for abdominal distention, abdominal pain, anal bleeding, blood in stool, constipation, diarrhea, nausea, rectal pain and vomiting.   Endocrine: Negative for cold intolerance, heat intolerance, polydipsia, polyphagia and polyuria.   Genitourinary:  "Negative for decreased urine volume, difficulty urinating, dysuria, enuresis, flank pain, frequency, genital sores, hematuria and urgency.   Musculoskeletal: Positive for back pain. Negative for arthralgias, gait problem, joint swelling, myalgias, neck pain and neck stiffness.   Skin: Negative for color change, pallor, rash and wound.   Allergic/Immunologic: Negative for environmental allergies, food allergies and immunocompromised state.   Neurological: Negative for dizziness, tremors, seizures, syncope, facial asymmetry, speech difficulty, weakness, light-headedness, numbness and headaches.   Hematological: Negative for adenopathy. Does not bruise/bleed easily.   Psychiatric/Behavioral: Negative for agitation, behavioral problems, confusion, decreased concentration, dysphoric mood, hallucinations, self-injury, sleep disturbance and suicidal ideas. The patient is not nervous/anxious and is not hyperactive.    All other systems reviewed and are negative.        Objective    reports that she quit smoking about 27 years ago. She has never used smokeless tobacco. She reports that she drinks about 0.6 - 1.2 oz of alcohol per week. She reports that she does not use drugs.   SMOKING STATUS: Non-smoker    Physical Exam:   Vitals:/64 (BP Location: Right arm, Patient Position: Sitting, Cuff Size: Adult)   Temp 97.5 °F (36.4 °C) (Temporal)   Ht 157.5 cm (62\")   Wt 59.9 kg (132 lb)   BMI 24.14 kg/m²    BMI: Body mass index is 24.14 kg/m².     GENERAL:   The patient is in no acute distress, and is able to answer all questions appropriately.  Skin:  The incision is well-healed and well approximated.  No signs of infection, bleeding, or erythema.  Musculoskeletal:     strength is 5 out of 5 bilaterally.   Shoulder abduction is 5 out of 5.    Dorsiflexion is 5/5 Bilaterally  Plantarflexion is 5/5 bilaterally  Hip Flexion 5/5 bilaterally.    The patient´s gait is normal without antalgia.  Neurologic:   The patient is " alert and oriented by 3.     Sensation is equal bilaterally with no deficit.      Reflexes:  2+ @ biceps, triceps, brachioradialis, as well as the patellar and Achilles tendon bilaterally.      Assessment/Plan   Independent Review of Radiographic Studies:    No new films reviewed at this visit  Pathology from 2 specimens of L1 and T10 vertebral bodies are negative for any malignancy    Medical Decision Making:    Patient will see us back in 6 weeks after continue some physical therapy.    Patient will start Mobic for her generalized arthritis and back pain.    Patient understands to call with any questions or concerns.    It has been a pleasure providing neurosurgical care.    Aquiles Wasserman PA-C  There are no diagnoses linked to this encounter.  No Follow-up on file.

## 2021-01-21 ENCOUNTER — IMMUNIZATION (OUTPATIENT)
Dept: VACCINE CLINIC | Facility: HOSPITAL | Age: 86
End: 2021-01-21

## 2021-01-21 PROCEDURE — 91300 HC SARSCOV02 VAC 30MCG/0.3ML IM: CPT | Performed by: INTERNAL MEDICINE

## 2021-01-21 PROCEDURE — 0001A: CPT | Performed by: INTERNAL MEDICINE

## 2021-02-11 ENCOUNTER — APPOINTMENT (OUTPATIENT)
Dept: VACCINE CLINIC | Facility: HOSPITAL | Age: 86
End: 2021-02-11

## 2021-02-15 ENCOUNTER — IMMUNIZATION (OUTPATIENT)
Dept: VACCINE CLINIC | Facility: HOSPITAL | Age: 86
End: 2021-02-15

## 2021-02-15 PROCEDURE — 0002A: CPT | Performed by: PHYSICIAN ASSISTANT

## 2021-02-15 PROCEDURE — 91300 HC SARSCOV02 VAC 30MCG/0.3ML IM: CPT | Performed by: PHYSICIAN ASSISTANT

## 2021-04-28 ENCOUNTER — RX ONLY (RX ONLY)
Age: 86
End: 2021-04-28

## 2021-04-28 ENCOUNTER — OFFICE (OUTPATIENT)
Dept: URBAN - METROPOLITAN AREA CLINIC 28 | Facility: CLINIC | Age: 86
Setting detail: OPHTHALMOLOGY
End: 2021-04-28
Payer: MEDICARE

## 2021-04-28 DIAGNOSIS — H40.1131: ICD-10-CM

## 2021-04-28 DIAGNOSIS — H52.03: ICD-10-CM

## 2021-04-28 DIAGNOSIS — H25.13: ICD-10-CM

## 2021-04-28 PROCEDURE — 92202 OPSCPY EXTND ON/MAC DRAW: CPT | Performed by: SPECIALIST

## 2021-04-28 PROCEDURE — 92133 CPTRZD OPH DX IMG PST SGM ON: CPT | Performed by: SPECIALIST

## 2021-04-28 PROCEDURE — 92004 COMPRE OPH EXAM NEW PT 1/>: CPT | Performed by: SPECIALIST

## 2021-04-28 PROCEDURE — 92015 DETERMINE REFRACTIVE STATE: CPT | Performed by: SPECIALIST

## 2021-04-28 ASSESSMENT — REFRACTION_CURRENTRX
OD_ADD: +2.75
OD_CYLINDER: -1.00
OS_AXIS: 083
OD_AXIS: 085
OS_CYLINDER: -1.25
OS_SPHERE: +2.25
OS_ADD: +2.75
OS_OVR_VA: 20/
OS_VPRISM_DIRECTION: PROGS
OD_VPRISM_DIRECTION: PROGS
OD_OVR_VA: 20/
OD_SPHERE: +2.25

## 2021-04-28 ASSESSMENT — CONFRONTATIONAL VISUAL FIELD TEST (CVF)
OS_FINDINGS: FULL
OD_FINDINGS: FULL

## 2021-04-28 ASSESSMENT — REFRACTION_MANIFEST
OD_CYLINDER: -1.50
OD_SPHERE: +2.00
OS_SPHERE: +1.75
OS_CYLINDER: -1.00
OD_AXIS: 065
OD_VA1: 20/60
OS_AXIS: 050

## 2021-04-28 ASSESSMENT — REFRACTION_AUTOREFRACTION
OD_SPHERE: +4.00
OD_CYLINDER: -3.25
OS_SPHERE: +3.50
OS_AXIS: 099
OD_AXIS: 075
OS_CYLINDER: -2.50

## 2021-04-28 ASSESSMENT — AXIALLENGTH_DERIVED
OS_AL: 22.4935
OS_AL: 22.8533
OD_AL: 22.8965
OD_AL: 22.4909

## 2021-04-28 ASSESSMENT — SPHEQUIV_DERIVED
OD_SPHEQUIV: 2.375
OS_SPHEQUIV: 1.25
OS_SPHEQUIV: 2.25
OD_SPHEQUIV: 1.25

## 2021-04-28 ASSESSMENT — TONOMETRY
OD_IOP_MMHG: 17
OS_IOP_MMHG: 17

## 2021-04-28 ASSESSMENT — VISUAL ACUITY
OD_BCVA: 20/60+
OS_BCVA: 20/60

## 2021-04-28 ASSESSMENT — KERATOMETRY
OD_K1POWER_DIOPTERS: 43.50
OS_K1POWER_DIOPTERS: 43.50
OS_AXISANGLE_DEGREES: 005
OD_K2POWER_DIOPTERS: 44.75
OD_AXISANGLE_DEGREES: 171
OS_K2POWER_DIOPTERS: 45.00
METHOD_AUTO_MANUAL: AUTO

## 2021-05-11 ENCOUNTER — OFFICE (OUTPATIENT)
Dept: URBAN - METROPOLITAN AREA CLINIC 28 | Facility: CLINIC | Age: 86
Setting detail: OPHTHALMOLOGY
End: 2021-05-11
Payer: MEDICARE

## 2021-05-11 DIAGNOSIS — H25.11: ICD-10-CM

## 2021-05-11 DIAGNOSIS — H25.12: ICD-10-CM

## 2021-05-11 DIAGNOSIS — H25.13: ICD-10-CM

## 2021-05-11 PROCEDURE — CWA-OS CORNEAL WAVEFRONT ANALYSIS: Performed by: SPECIALIST

## 2021-05-11 PROCEDURE — 92136 OPHTHALMIC BIOMETRY: CPT | Performed by: SPECIALIST

## 2021-05-11 PROCEDURE — CWA-OD CORNEAL WAVEFRONT ANALYSIS: Performed by: SPECIALIST

## 2021-05-11 ASSESSMENT — REFRACTION_MANIFEST
OS_SPHERE: +1.75
OD_AXIS: 065
OS_AXIS: 050
OD_CYLINDER: -1.50
OS_CYLINDER: -1.00
OD_VA1: 20/60
OD_SPHERE: +2.00

## 2021-05-11 ASSESSMENT — KERATOMETRY
OD_CYLAXISANGLE_DEGREES: 75
OS_K2POWER_DIOPTERS: 43.00
OS_AXISANGLE2_DEGREES: 90
OS_K1POWER_DIOPTERS: 45.00
OD_AXISANGLE_DEGREES: 75
OD_K2POWER_DIOPTERS: 43.25
OD_AXISANGLE_DEGREES: 75
OS_K2POWER_DIOPTERS: 43.00
OD_K1K2_AVERAGE: 44
OD_AXISANGLE2_DEGREES: 165
OS_AXISANGLE_DEGREES: 90
OS_K1POWER_DIOPTERS: 45.00
OS_AXISANGLE_DEGREES: 90
METHOD_AUTO_MANUAL: AUTO
OS_K1K2_AVERAGE: 44
OD_K1POWER_DIOPTERS: 44.75
OD_CYLPOWER_DEGREES: 1.5
OD_K2POWER_DIOPTERS: 43.25
OS_CYLPOWER_DEGREES: 2
OD_K1POWER_DIOPTERS: 44.75
OS_CYLAXISANGLE_DEGREES: 90

## 2021-05-11 ASSESSMENT — SPHEQUIV_DERIVED
OD_SPHEQUIV: 2.5
OS_SPHEQUIV: 2.125
OS_SPHEQUIV: 1.25
OD_SPHEQUIV: 1.25

## 2021-05-11 ASSESSMENT — REFRACTION_CURRENTRX
OS_AXIS: 083
OD_VPRISM_DIRECTION: PROGS
OD_AXIS: 085
OD_ADD: +2.75
OS_SPHERE: +2.25
OS_OVR_VA: 20/
OS_ADD: +2.75
OS_CYLINDER: -1.25
OD_OVR_VA: 20/
OS_VPRISM_DIRECTION: PROGS
OD_SPHERE: +2.25
OD_CYLINDER: -1.00

## 2021-05-11 ASSESSMENT — REFRACTION_AUTOREFRACTION
OD_AXIS: 069
OD_CYLINDER: -3.00
OS_CYLINDER: -2.75
OD_SPHERE: +4.00
OS_SPHERE: +3.50
OS_AXIS: 097

## 2021-05-11 ASSESSMENT — CONFRONTATIONAL VISUAL FIELD TEST (CVF)
OS_FINDINGS: FULL
OD_FINDINGS: FULL

## 2021-05-11 ASSESSMENT — AXIALLENGTH_DERIVED
OS_AL: 22.6219
OD_AL: 22.9398
OD_AL: 22.4884
OS_AL: 22.9398

## 2021-05-11 ASSESSMENT — VISUAL ACUITY
OS_BCVA: 20/60
OD_BCVA: 20/60+

## 2021-06-17 ENCOUNTER — AMBULATORY SURGERY CENTER (OUTPATIENT)
Dept: URBAN - METROPOLITAN AREA CLINIC 124 | Facility: CLINIC | Age: 86
Setting detail: OPHTHALMOLOGY
End: 2021-06-17
Payer: MEDICARE

## 2021-06-17 DIAGNOSIS — H52.211: ICD-10-CM

## 2021-06-17 DIAGNOSIS — H25.11: ICD-10-CM

## 2021-06-17 PROCEDURE — FEMTO CATARACT LASER: Performed by: SPECIALIST

## 2021-06-17 PROCEDURE — A9270 NON-COVERED ITEM OR SERVICE: HCPCS | Performed by: SPECIALIST

## 2021-06-17 PROCEDURE — 66984 XCAPSL CTRC RMVL W/O ECP: CPT | Performed by: SPECIALIST

## 2021-06-18 ENCOUNTER — OFFICE (OUTPATIENT)
Dept: URBAN - METROPOLITAN AREA CLINIC 28 | Facility: CLINIC | Age: 86
Setting detail: OPHTHALMOLOGY
End: 2021-06-18
Payer: MEDICARE

## 2021-06-18 DIAGNOSIS — H25.12: ICD-10-CM

## 2021-06-18 DIAGNOSIS — Z96.1: ICD-10-CM

## 2021-06-18 PROCEDURE — 99024 POSTOP FOLLOW-UP VISIT: CPT | Performed by: SPECIALIST

## 2021-06-18 PROCEDURE — 92136 OPHTHALMIC BIOMETRY: CPT | Performed by: SPECIALIST

## 2021-06-18 ASSESSMENT — VISUAL ACUITY
OD_BCVA: 20/60+
OS_BCVA: 20/80

## 2021-06-18 ASSESSMENT — KERATOMETRY
OS_AXISANGLE_DEGREES: 90
OS_K1POWER_DIOPTERS: 45.00
OS_K2POWER_DIOPTERS: 43.00
OD_K1POWER_DIOPTERS: 44.75
OD_AXISANGLE_DEGREES: 75
OD_K2POWER_DIOPTERS: 43.25
METHOD_AUTO_MANUAL: AUTO

## 2021-06-18 ASSESSMENT — REFRACTION_AUTOREFRACTION
OD_SPHERE: +4.00
OS_AXIS: 097
OD_AXIS: 069
OS_CYLINDER: -2.75
OS_SPHERE: +3.50
OD_CYLINDER: -3.00

## 2021-06-18 ASSESSMENT — REFRACTION_CURRENTRX
OS_ADD: +2.75
OS_VPRISM_DIRECTION: PROGS
OS_AXIS: 083
OD_CYLINDER: -1.00
OD_AXIS: 085
OS_OVR_VA: 20/
OS_SPHERE: +2.25
OD_VPRISM_DIRECTION: PROGS
OD_OVR_VA: 20/
OD_SPHERE: +2.25
OS_CYLINDER: -1.25
OD_ADD: +2.75

## 2021-06-18 ASSESSMENT — REFRACTION_MANIFEST
OD_CYLINDER: -1.50
OD_VA1: 20/60
OS_AXIS: 050
OD_SPHERE: +2.00
OS_CYLINDER: -1.00
OD_AXIS: 065
OS_SPHERE: +1.75

## 2021-06-18 ASSESSMENT — SPHEQUIV_DERIVED
OD_SPHEQUIV: 2.5
OS_SPHEQUIV: 1.25
OD_SPHEQUIV: 1.25
OS_SPHEQUIV: 2.125

## 2021-06-18 ASSESSMENT — AXIALLENGTH_DERIVED
OS_AL: 22.9398
OD_AL: 22.9398
OD_AL: 22.4884
OS_AL: 22.6219

## 2021-06-18 ASSESSMENT — TONOMETRY: OD_IOP_MMHG: 12

## 2021-06-25 ENCOUNTER — OFFICE (OUTPATIENT)
Dept: URBAN - METROPOLITAN AREA CLINIC 28 | Facility: CLINIC | Age: 86
Setting detail: OPHTHALMOLOGY
End: 2021-06-25
Payer: MEDICARE

## 2021-06-25 DIAGNOSIS — Z96.1: ICD-10-CM

## 2021-06-25 PROCEDURE — 99024 POSTOP FOLLOW-UP VISIT: CPT | Performed by: SPECIALIST

## 2021-06-25 ASSESSMENT — REFRACTION_CURRENTRX
OS_AXIS: 079
OS_AXIS: 083
OS_SPHERE: +2.12
OD_ADD: +2.75
OD_SPHERE: +2.12
OS_SPHERE: +2.25
OD_CYLINDER: -1.00
OS_CYLINDER: -1.25
OD_OVR_VA: 20/
OS_OVR_VA: 20/
OS_CYLINDER: -1.25
OD_OVR_VA: 20/
OS_OVR_VA: 20/
OD_VPRISM_DIRECTION: PROGS
OD_ADD: +3.00
OS_VPRISM_DIRECTION: PROGS
OS_VPRISM_DIRECTION: PROGS
OD_AXIS: 086
OD_VPRISM_DIRECTION: PROGS
OD_SPHERE: +2.25
OS_ADD: +2.75
OS_ADD: +3.00
OD_CYLINDER: -1.00
OD_AXIS: 085

## 2021-06-25 ASSESSMENT — REFRACTION_MANIFEST
OD_VA1: 20/40
OS_SPHERE: +1.75
OD_CYLINDER: -1.25
OD_AXIS: 105
OS_AXIS: 050
OS_CYLINDER: -1.00
OD_SPHERE: +0.50

## 2021-06-25 ASSESSMENT — REFRACTION_AUTOREFRACTION
OD_CYLINDER: -1.25
OS_CYLINDER: -3.00
OD_SPHERE: +0.50
OS_AXIS: 098
OS_SPHERE: +3.50
OD_AXIS: 107

## 2021-06-25 ASSESSMENT — VISUAL ACUITY
OS_BCVA: 20/50+
OD_BCVA: 20/60+

## 2021-06-25 ASSESSMENT — KERATOMETRY
OD_K1POWER_DIOPTERS: 42.75
OS_K2POWER_DIOPTERS: 45.00
OS_AXISANGLE_DEGREES: 003
OD_K2POWER_DIOPTERS: 44.00
METHOD_AUTO_MANUAL: AUTO
OD_AXISANGLE_DEGREES: 010
OS_K1POWER_DIOPTERS: 043.25

## 2021-06-25 ASSESSMENT — SPHEQUIV_DERIVED
OD_SPHEQUIV: -0.125
OS_SPHEQUIV: 1.25
OS_SPHEQUIV: 2
OD_SPHEQUIV: -0.125

## 2021-06-25 ASSESSMENT — TONOMETRY: OD_IOP_MMHG: 14

## 2021-06-25 ASSESSMENT — AXIALLENGTH_DERIVED
OD_AL: 23.6869
OS_AL: 22.8965
OS_AL: 22.6245
OD_AL: 23.6869

## 2021-06-29 ENCOUNTER — RX ONLY (RX ONLY)
Age: 86
End: 2021-06-29

## 2021-07-01 ENCOUNTER — AMBULATORY SURGERY CENTER (OUTPATIENT)
Dept: URBAN - METROPOLITAN AREA CLINIC 124 | Facility: CLINIC | Age: 86
Setting detail: OPHTHALMOLOGY
End: 2021-07-01
Payer: MEDICARE

## 2021-07-01 DIAGNOSIS — H52.212: ICD-10-CM

## 2021-07-01 DIAGNOSIS — H25.12: ICD-10-CM

## 2021-07-01 PROCEDURE — A9270 NON-COVERED ITEM OR SERVICE: HCPCS | Performed by: SPECIALIST

## 2021-07-01 PROCEDURE — 66984 XCAPSL CTRC RMVL W/O ECP: CPT | Performed by: SPECIALIST

## 2021-07-01 PROCEDURE — FEMTO CATARACT LASER: Performed by: SPECIALIST

## 2021-07-02 ENCOUNTER — OFFICE (OUTPATIENT)
Dept: URBAN - METROPOLITAN AREA CLINIC 28 | Facility: CLINIC | Age: 86
Setting detail: OPHTHALMOLOGY
End: 2021-07-02

## 2021-07-02 DIAGNOSIS — Z96.1: ICD-10-CM

## 2021-07-02 PROCEDURE — 99024 POSTOP FOLLOW-UP VISIT: CPT | Performed by: OPTOMETRIST

## 2021-07-02 ASSESSMENT — VISUAL ACUITY
OS_BCVA: 20/50+
OD_BCVA: 20/100

## 2021-07-02 ASSESSMENT — REFRACTION_MANIFEST
OS_CYLINDER: -1.00
OS_AXIS: 050
OD_SPHERE: +0.50
OS_SPHERE: +1.75
OD_CYLINDER: -1.25
OD_VA1: 20/40
OD_AXIS: 105

## 2021-07-02 ASSESSMENT — REFRACTION_CURRENTRX
OD_ADD: +3.00
OS_VPRISM_DIRECTION: PROGS
OD_VPRISM_DIRECTION: PROGS
OD_CYLINDER: -1.00
OD_AXIS: 085
OD_OVR_VA: 20/
OD_AXIS: 086
OS_AXIS: 079
OD_SPHERE: +2.12
OS_SPHERE: +2.12
OS_VPRISM_DIRECTION: PROGS
OS_SPHERE: +2.25
OS_AXIS: 083
OS_CYLINDER: -1.25
OD_VPRISM_DIRECTION: PROGS
OD_OVR_VA: 20/
OD_ADD: +2.75
OS_CYLINDER: -1.25
OD_CYLINDER: -1.00
OS_ADD: +3.00
OS_OVR_VA: 20/
OS_ADD: +2.75
OD_SPHERE: +2.25
OS_OVR_VA: 20/

## 2021-07-02 ASSESSMENT — SPHEQUIV_DERIVED
OS_SPHEQUIV: 1.25
OS_SPHEQUIV: 2
OD_SPHEQUIV: -0.125
OD_SPHEQUIV: -0.125

## 2021-07-02 ASSESSMENT — KERATOMETRY
OD_K1POWER_DIOPTERS: 42.75
OD_K2POWER_DIOPTERS: 44.00
OS_K1POWER_DIOPTERS: 043.25
OS_K2POWER_DIOPTERS: 45.00
OS_AXISANGLE_DEGREES: 003
OD_AXISANGLE_DEGREES: 010
METHOD_AUTO_MANUAL: AUTO

## 2021-07-02 ASSESSMENT — TONOMETRY
OS_IOP_MMHG: 13
OD_IOP_MMHG: 16

## 2021-07-02 ASSESSMENT — REFRACTION_AUTOREFRACTION
OD_CYLINDER: -1.25
OS_AXIS: 098
OS_SPHERE: +3.50
OS_CYLINDER: -3.00
OD_AXIS: 107
OD_SPHERE: +0.50

## 2021-07-02 ASSESSMENT — AXIALLENGTH_DERIVED
OD_AL: 23.6869
OD_AL: 23.6869
OS_AL: 22.6245
OS_AL: 22.8965

## 2021-07-02 ASSESSMENT — CORNEAL EDEMA - FOLDS/STRIAE: OS_FOLDSSTRIAE: 2+

## 2021-07-02 ASSESSMENT — CORNEAL EDEMA - MICROCYSTIC EPITHELIAL EDEMA (MCE): OS_MCE: T 1+

## 2021-07-09 ENCOUNTER — OFFICE (OUTPATIENT)
Dept: URBAN - METROPOLITAN AREA CLINIC 28 | Facility: CLINIC | Age: 86
Setting detail: OPHTHALMOLOGY
End: 2021-07-09
Payer: MEDICARE

## 2021-07-09 DIAGNOSIS — Z96.1: ICD-10-CM

## 2021-07-09 PROCEDURE — 99024 POSTOP FOLLOW-UP VISIT: CPT | Performed by: SPECIALIST

## 2021-07-09 ASSESSMENT — AXIALLENGTH_DERIVED
OD_AL: 23.7798
OS_AL: 23.3932
OS_AL: 23.4412
OD_AL: 23.6813

## 2021-07-09 ASSESSMENT — REFRACTION_CURRENTRX
OS_ADD: +3.00
OD_VPRISM_DIRECTION: PROGS
OD_SPHERE: +2.12
OD_AXIS: 086
OS_CYLINDER: -1.25
OD_OVR_VA: 20/
OD_SPHERE: +2.25
OS_AXIS: 079
OD_ADD: +2.75
OS_CYLINDER: -1.25
OS_OVR_VA: 20/
OD_VPRISM_DIRECTION: PROGS
OS_ADD: +2.75
OS_AXIS: 083
OS_SPHERE: +2.25
OS_VPRISM_DIRECTION: PROGS
OS_SPHERE: +2.12
OD_ADD: +3.00
OD_CYLINDER: -1.00
OS_OVR_VA: 20/
OS_VPRISM_DIRECTION: PROGS
OD_CYLINDER: -1.00
OD_AXIS: 085
OD_OVR_VA: 20/

## 2021-07-09 ASSESSMENT — REFRACTION_AUTOREFRACTION
OS_CYLINDER: -1.75
OS_SPHERE: +1.50
OD_SPHERE: +1.00
OD_AXIS: 111
OS_AXIS: 066
OD_CYLINDER: -1.75

## 2021-07-09 ASSESSMENT — REFRACTION_MANIFEST
OD_SPHERE: +0.50
OD_CYLINDER: -1.25
OD_AXIS: 105
OS_VA1: 20/30
OS_SPHERE: +1.25
OD_VA1: 20/40
OS_AXIS: 070
OS_CYLINDER: -1.00

## 2021-07-09 ASSESSMENT — KERATOMETRY
OS_K1POWER_DIOPTERS: 42.50
OS_AXISANGLE_DEGREES: 161
OD_K2POWER_DIOPTERS: 44.00
OD_K1POWER_DIOPTERS: 42.25
OS_K2POWER_DIOPTERS: 44.00
OD_AXISANGLE_DEGREES: 014
METHOD_AUTO_MANUAL: AUTO

## 2021-07-09 ASSESSMENT — SPHEQUIV_DERIVED
OD_SPHEQUIV: -0.125
OS_SPHEQUIV: 0.625
OD_SPHEQUIV: 0.125
OS_SPHEQUIV: 0.75

## 2021-07-09 ASSESSMENT — TONOMETRY
OD_IOP_MMHG: 13
OS_IOP_MMHG: 15

## 2021-07-09 ASSESSMENT — VISUAL ACUITY
OD_BCVA: 20/100+
OS_BCVA: 20/30-+

## 2021-07-31 ENCOUNTER — HOSPITAL ENCOUNTER (EMERGENCY)
Facility: HOSPITAL | Age: 86
Discharge: HOME | End: 2021-07-31
Attending: EMERGENCY MEDICINE | Admitting: EMERGENCY MEDICINE

## 2021-07-31 ENCOUNTER — APPOINTMENT (OUTPATIENT)
Dept: GENERAL RADIOLOGY | Facility: HOSPITAL | Age: 86
End: 2021-07-31

## 2021-07-31 ENCOUNTER — APPOINTMENT (OUTPATIENT)
Dept: CT IMAGING | Facility: HOSPITAL | Age: 86
End: 2021-07-31

## 2021-07-31 VITALS
DIASTOLIC BLOOD PRESSURE: 79 MMHG | WEIGHT: 135 LBS | OXYGEN SATURATION: 94 % | RESPIRATION RATE: 16 BRPM | SYSTOLIC BLOOD PRESSURE: 135 MMHG | BODY MASS INDEX: 24.84 KG/M2 | TEMPERATURE: 98.1 F | HEART RATE: 74 BPM | HEIGHT: 62 IN

## 2021-07-31 DIAGNOSIS — S80.11XA CONTUSION OF RIGHT TIBIA: ICD-10-CM

## 2021-07-31 DIAGNOSIS — S81.811A SKIN TEAR OF RIGHT LOWER LEG WITHOUT COMPLICATION, INITIAL ENCOUNTER: ICD-10-CM

## 2021-07-31 DIAGNOSIS — S09.90XA INJURY OF HEAD, INITIAL ENCOUNTER: Primary | ICD-10-CM

## 2021-07-31 PROCEDURE — 72125 CT NECK SPINE W/O DYE: CPT

## 2021-07-31 PROCEDURE — 99283 EMERGENCY DEPT VISIT LOW MDM: CPT

## 2021-07-31 PROCEDURE — 73590 X-RAY EXAM OF LOWER LEG: CPT

## 2021-07-31 PROCEDURE — 70450 CT HEAD/BRAIN W/O DYE: CPT

## 2021-07-31 RX ORDER — ATENOLOL 25 MG/1
25 TABLET ORAL ONCE
Status: COMPLETED | OUTPATIENT
Start: 2021-07-31 | End: 2021-07-31

## 2021-07-31 RX ADMIN — ATENOLOL 25 MG: 25 TABLET ORAL at 22:04

## 2021-08-01 NOTE — ED PROVIDER NOTES
[place]    EMERGENCY DEPARTMENT ENCOUNTER      Pt Name: Michelle Hoff  MRN: [id number]  YOB: 1935  Date of evaluation: 7/31/2021  Provider: Nacho Medeiros MD    CHIEF COMPLAINT       Chief Complaint   Patient presents with   • Fall         HISTORY OF PRESENT ILLNESS  (Location/Symptom, Timing/Onset, Context/Setting, Quality, Duration, Modifying Factors, Severity.)   Michelle Hoff is a 86 y.o. female who presents to the emergency department after mechanical fall backwards off of her walker.  She struck the back of her head on the ground without any associated loss of consciousness and she denies any associated neck pain, paresthesia, weakness, numbness, or difficulty ambulating.  She does have skin tear to the right shin area as well sustained during fall and complains of moderate aching pain to this area that is worse with movement but denies any distal weakness or numbness.  She is not on any anticoagulant medications and denies any other area of injury.      Nursing notes were reviewed.    REVIEW OF SYSTEMS    (2-9 systems for level 4, 10 or more for level 5)   ROS:  General:  No fevers, no chills, no weakness  Cardiovascular:  No chest pain, no palpitations  Respiratory:  No shortness of breath, no cough, no wheezing  Gastrointestinal:  No pain, no nausea, no vomiting, no diarrhea  Musculoskeletal: Leg pain  Skin: Skin tear  Neurologic:  No speech problems, no headache, no extremity numbness, no extremity tingling, no extremity weakness  Psychiatric:  No anxiety  Genitourinary:  No dysuria, no hematuria    Except as noted above the remainder of the review of systems was reviewed and negative.       PAST MEDICAL HISTORY     Past Medical History:   Diagnosis Date   • Arthritis    • Compression fracture of body of thoracic vertebra (CMS/HCC)    • Fracture     T9   • Glaucoma    • Hypertension    • Macular degeneration    • Skin tear of left lower leg without complication          SURGICAL HISTORY        Past Surgical History:   Procedure Laterality Date   • CATARACT EXTRACTION     • COLONOSCOPY  APROX AT AGE 80   • KYPHOPLASTY N/A 10/10/2019    Procedure: KYPHOPLASTY  T10, L1;  Surgeon: Fausto Horton MD;  [facility];  Service: Neurosurgery         CURRENT MEDICATIONS     No current facility-administered medications for this encounter.    Current Outpatient Medications:   •  atenolol (TENORMIN) 25 MG tablet, Take 1 tablet by mouth Every Evening., Disp: 30 tablet, Rfl: 11  •  calcitonin, salmon, (MIACALCIN) 200 UNIT/ACT nasal spray, Use 1 spray daily Alternating Nostrils., Disp: 3.7 mL, Rfl: 0  •  Cholecalciferol (VITAMIN D3) 5000 units capsule capsule, Take 5,000 Units by mouth Daily., Disp: , Rfl:   •  folic acid (FOLVITE) 1 MG tablet, Take 1 mg by mouth Daily., Disp: , Rfl:   •  latanoprost (XALATAN) 0.005 % ophthalmic solution, Administer 1 drop to both eyes Every Night., Disp: , Rfl:   •  LISINOPRIL PO, Take 30 mg by mouth Daily., Disp: , Rfl:   •  medroxyPROGESTERone (PROVERA) 5 MG tablet, Take 5 mg by mouth Daily., Disp: , Rfl:   •  meloxicam (MOBIC) 15 MG tablet, Take 1 tablet by mouth Daily., Disp: 60 tablet, Rfl: 6  •  methotrexate 2.5 MG tablet, Take 7.5 mg by mouth 1 (One) Time Per Week. q monday, Disp: , Rfl:   •  predniSONE (DELTASONE) 2.5 MG tablet, Take 5 mg by mouth Daily., Disp: , Rfl:   •  timolol (TIMOPTIC) 0.25 % ophthalmic solution, Administer 1 drop to both eyes 2 (Two) Times a Day., Disp: , Rfl:   •  traMADol (ULTRAM) 50 MG tablet, Take 1 tablet by mouth Every 8 (Eight) Hours As Needed for Moderate Pain ., Disp: 30 tablet, Rfl: 0    ALLERGIES     Cardizem [diltiazem hcl], Metoprolol, and Adhesive tape    FAMILY HISTORY     No family history on file.       SOCIAL HISTORY       Social History     Socioeconomic History   • Marital status:      Spouse name: Not on file   • Number of children: Not on file   • Years of education: Not on file   • Highest education level:  Not on file   Tobacco Use   • Smoking status: Former Smoker     Quit date:      Years since quittin.6   • Smokeless tobacco: Never Used   Substance and Sexual Activity   • Alcohol use: Yes     Alcohol/week: 1.0 - 2.0 standard drinks     Types: 1 - 2 Glasses of wine per week   • Drug use: No   • Sexual activity: Defer         PHYSICAL EXAM    (up to 7 for level 4, 8 or more for level 5)     Vitals:    21 2100 21 2200 21 2230 21 2306   BP: 168/83 163/80 154/71 135/79   Pulse: 80 78 76 74   Resp:    16   Temp:       TempSrc:       SpO2: 91% 93% 90% 94%   Weight:       Height:           Physical Exam  General: Awake, alert, no acute distress.  HEENT: Pupils are equally round and reactive to light, EOMI, conjunctivae clear, sclerae white, there is no injection no icterus.  Oral mucosa is moist, no exudate. Uvula is midline. No malocclusion or tenderness over the mandible. There is no hemotympanum, ipna sign, or raccoon eyes.  Neck: Neck is supple, full range of motion, trachea midline. No midline tenderness.  Cardiac: Heart regular rate, rhythm, no murmurs, rubs, or gallops. Peripheral pulses are 2+ throughout.  Lungs: Lungs are clear to auscultation, there is no wheezing, rhonchi, or rales. There is no use of accessory muscles.  Chest wall: There is no tenderness to palpation over the chest wall or over ribs. There are no chest wall ecchymoses.  Abdomen: Abdomen is soft, nontender, nondistended. There are no firm or pulsatile masses, no rebound rigidity or guarding. No abdominal wall ecchymoses.  Musculoskeletal: No deformity or focal bone tenderness.  Neuro: Motor intact, sensory intact, level of consciousness is normal.  Dermatology: 4 cm skin tear to the right anterior lower leg  Psych: Mentation is grossly normal, cognition is grossly normal. Affect is appropriate.        DIAGNOSTIC RESULTS     RADIOLOGY:   Non-plain film images such as CT, Ultrasound and MRI are read by the  radiologist. Plain radiographic images are visualized and preliminarily interpreted by the emergency physician with the below findings:      [x] Radiologist's Report Reviewed:  XR Tibia Fibula 2 View Right   Final Result   No acute radiographic findings.      Signer Name: Miguel Jacobo MD    Signed: 7/31/2021 10:00 PM    Workstation Name: [facility]      CT Head Without Contrast   Final Result      1.  No acute intracranial abnormality.      Signer Name: NOLBERTO JAMES MD    Signed: 7/31/2021 9:39 PM    [facility]      CT Cervical Spine Without Contrast   Final Result   No clearly acute radiographic findings.      Clinical aspects of the case will determine if MR of the cervical spine could provide additional information.      Signer Name: Miguel Jacobo MD    Signed: 7/31/2021 9:43 PM    Workstation Name: [facility]              EMERGENCY DEPARTMENT COURSE and DIFFERENTIAL DIAGNOSIS/MDM:   Vitals:    Vitals:    07/31/21 2100 07/31/21 2200 07/31/21 2230 07/31/21 2306   BP: 168/83 163/80 154/71 135/79   Pulse: 80 78 76 74   Resp:    16   Temp:       TempSrc:       SpO2: 91% 93% 90% 94%   Weight:       Height:           ED Course as of Aug 01 0651   Sat Jul 31, 2021 2207 Right tib-fib x-ray personally reviewed and there is no evidence of acute fracture    [NS]   2217 I have reviewed the patient's imaging.  There is no evidence of acute intracranial hemorrhage, skull fracture, or C-spine fracture based on CT imaging.  Right-sided tib-fib x-ray is negative for acute fracture.  Patient's tetanus status is up-to-date within the past 5 years.  Based on complete history and physical examination, there is no evidence of additional traumatic injury.  She does have very superficial skin tear to the right shin area that is not amenable to repair.  Will place sterile bandage.    [NS]       ED Course User Index  [NS] Nacho Medeiros MD         I had a discussion with the patient/family regarding diagnosis, diagnostic results, treatment plan, and medications.  The patient/family indicated understanding of these instructions.  I spent adequate time at the bedside preceding discharge necessary to personally discuss the aftercare instructions, giving patient education, providing explanations of the results of our evaluations/findings, and my decision making to assure that the patient/family understand the plan of care.  Time was allotted to answer questions at that time and throughout the ED course.  Emphasis was placed on timely follow-up after discharge.  I also discussed the potential for the development of an acute emergent condition requiring further evaluation, admission, or even surgical intervention. I discussed that we found nothing during the visit today indicating the need for further workup, admission, or the presence of an unstable medical condition.  I encouraged the patient to return to the emergency department immediately for ANY concerns, worsening, new complaints, or if symptoms persist and unable to seek follow-up in a timely fashion.  The patient/family expressed understanding and agreement with this plan.  The patient will follow-up with their PCP in 1-2 days for reevaluation.       MEDICATIONS ADMINISTERED IN ED:  Medications   atenolol (TENORMIN) tablet 25 mg (25 mg Oral Given 7/31/21 2204)         FINAL IMPRESSION      1. Injury of head, initial encounter    2. Contusion of right tibia    3. Skin tear of right lower leg without complication, initial encounter          DISPOSITION/PLAN     ED Disposition     ED Disposition Condition Comment    Discharge Stable           PATIENT REFERRED TO:  Alma Casanova MD  [address] [facility] [address]  [facility] [id number] [phone]    Schedule an appointment as soon as possible for a visit in 2 days      [facility]  Emergency Department  [address] [address] [phone]    If symptoms worsen      DISCHARGE MEDICATIONS:     Medication List      CONTINUE taking these medications    atenolol 25 MG tablet  Commonly known as: TENORMIN  Take 1 tablet by mouth Every Evening.     calcitonin (salmon) 200 UNIT/ACT nasal spray  Commonly known as: Miacalcin  Use 1 spray daily Alternating Nostrils.     folic acid 1 MG tablet  Commonly known as: FOLVITE     latanoprost 0.005 % ophthalmic solution  Commonly known as: XALATAN     LISINOPRIL PO     medroxyPROGESTERone 5 MG tablet  Commonly known as: PROVERA     meloxicam 15 MG tablet  Commonly known as: MOBIC  Take 1 tablet by mouth Daily.     methotrexate 2.5 MG tablet     predniSONE 2.5 MG tablet  Commonly known as: DELTASONE     timolol 0.25 % ophthalmic solution  Commonly known as: TIMOPTIC     traMADol 50 MG tablet  Commonly known as: ULTRAM  Take 1 tablet by mouth Every 8 (Eight) Hours As Needed for Moderate Pain .     vitamin D3 125 MCG (5000 UT) capsule capsule                Comment: Please note this report has been produced using speech recognition software.      Nacho Medeiros MD  Attending Emergency Physician               Nacho Medeiros MD  08/01/21 6051

## 2021-08-02 ENCOUNTER — OFFICE (OUTPATIENT)
Dept: URBAN - METROPOLITAN AREA CLINIC 28 | Facility: CLINIC | Age: 86
Setting detail: OPHTHALMOLOGY
End: 2021-08-02
Payer: MEDICARE

## 2021-08-02 DIAGNOSIS — Z96.1: ICD-10-CM

## 2021-08-02 DIAGNOSIS — H52.4: ICD-10-CM

## 2021-08-02 PROCEDURE — 99024 POSTOP FOLLOW-UP VISIT: CPT | Performed by: OPTOMETRIST

## 2021-08-02 PROCEDURE — 92015 DETERMINE REFRACTIVE STATE: CPT | Performed by: OPTOMETRIST

## 2021-08-02 ASSESSMENT — REFRACTION_CURRENTRX
OS_SPHERE: +2.25
OS_AXIS: 083
OS_ADD: +3.00
OS_ADD: +2.75
OS_CYLINDER: -1.25
OS_SPHERE: +2.12
OD_CYLINDER: -1.00
OD_OVR_VA: 20/
OD_SPHERE: +2.12
OD_VPRISM_DIRECTION: PROGS
OD_VPRISM_DIRECTION: PROGS
OS_OVR_VA: 20/
OD_AXIS: 086
OS_VPRISM_DIRECTION: PROGS
OS_OVR_VA: 20/
OS_VPRISM_DIRECTION: PROGS
OD_AXIS: 085
OS_CYLINDER: -1.25
OD_OVR_VA: 20/
OS_AXIS: 079
OD_SPHERE: +2.25
OD_CYLINDER: -1.00
OD_ADD: +3.00
OD_ADD: +2.75

## 2021-08-02 ASSESSMENT — REFRACTION_MANIFEST
OS_AXIS: 060
OS_ADD: +3.00
OD_VA2: 20/20(J1+)
OD_AXIS: 095
OS_SPHERE: +0.50
OD_CYLINDER: -0.75
OD_SPHERE: +0.75
OU_VA: 20/25
OD_VA1: 0/30+
OS_CYLINDER: -0.75
OD_ADD: +3.00
OS_VA1: 0/30
OS_VA2: 20/20(J1+)

## 2021-08-02 ASSESSMENT — AXIALLENGTH_DERIVED
OS_AL: 23.5891
OS_AL: 23.5891
OD_AL: 23.5378
OD_AL: 23.6841

## 2021-08-02 ASSESSMENT — REFRACTION_AUTOREFRACTION
OD_SPHERE: +0.75
OS_CYLINDER: -1.25
OS_AXIS: 057
OD_AXIS: 110
OS_SPHERE: +0.75
OD_CYLINDER: -1.50

## 2021-08-02 ASSESSMENT — TONOMETRY: OD_IOP_MMHG: 14

## 2021-08-02 ASSESSMENT — VISUAL ACUITY
OS_BCVA: 20/40+
OD_BCVA: 20/40

## 2021-08-02 ASSESSMENT — SPHEQUIV_DERIVED
OD_SPHEQUIV: 0.375
OS_SPHEQUIV: 0.125
OS_SPHEQUIV: 0.125
OD_SPHEQUIV: 0

## 2021-08-02 ASSESSMENT — KERATOMETRY
METHOD_AUTO_MANUAL: AUTO
OD_AXISANGLE_DEGREES: 012
OS_AXISANGLE_DEGREES: 151
OS_K1POWER_DIOPTERS: 42.75
OD_K1POWER_DIOPTERS: 42.50
OS_K2POWER_DIOPTERS: 44.00
OD_K2POWER_DIOPTERS: 44.00

## 2021-08-02 ASSESSMENT — SUPERFICIAL PUNCTATE KERATITIS (SPK)
OS_SPK: T
OD_SPK: T 1+

## 2021-09-29 ENCOUNTER — OFFICE (OUTPATIENT)
Dept: URBAN - METROPOLITAN AREA CLINIC 28 | Facility: CLINIC | Age: 86
Setting detail: OPHTHALMOLOGY
End: 2021-09-29
Payer: MEDICARE

## 2021-09-29 DIAGNOSIS — H16.223: ICD-10-CM

## 2021-09-29 DIAGNOSIS — H40.1131: ICD-10-CM

## 2021-09-29 PROCEDURE — 92014 COMPRE OPH EXAM EST PT 1/>: CPT | Performed by: SPECIALIST

## 2021-09-29 PROCEDURE — 92133 CPTRZD OPH DX IMG PST SGM ON: CPT | Performed by: SPECIALIST

## 2021-09-29 ASSESSMENT — KERATOMETRY
OD_K2POWER_DIOPTERS: 43.75
OS_K2POWER_DIOPTERS: 43.75
OD_AXISANGLE_DEGREES: 019
OS_AXISANGLE_DEGREES: 140
OD_K1POWER_DIOPTERS: 43.00
OS_K1POWER_DIOPTERS: 42.75
METHOD_AUTO_MANUAL: AUTO

## 2021-09-29 ASSESSMENT — SPHEQUIV_DERIVED
OD_SPHEQUIV: 0.125
OD_SPHEQUIV: 0.375
OS_SPHEQUIV: 0.125
OS_SPHEQUIV: 0.125

## 2021-09-29 ASSESSMENT — REFRACTION_CURRENTRX
OS_VPRISM_DIRECTION: PROGS
OD_AXIS: 086
OS_SPHERE: +0.50
OS_AXIS: 055
OD_ADD: +3.00
OD_OVR_VA: 20/
OS_OVR_VA: 20/
OD_CYLINDER: -1.00
OS_ADD: +2.75
OD_VPRISM_DIRECTION: PROGS
OS_AXIS: 079
OS_CYLINDER: -0.75
OD_CYLINDER: -0.75
OS_VPRISM_DIRECTION: PROGS
OD_SPHERE: +2.12
OS_SPHERE: +2.12
OD_ADD: +2.75
OS_ADD: +3.00
OS_OVR_VA: 20/
OD_SPHERE: +0.75
OD_VPRISM_DIRECTION: PROGS
OD_AXIS: 100
OS_CYLINDER: -1.25
OD_OVR_VA: 20/

## 2021-09-29 ASSESSMENT — AXIALLENGTH_DERIVED
OD_AL: 23.4921
OD_AL: 23.5891
OS_AL: 23.6351
OS_AL: 23.6351

## 2021-09-29 ASSESSMENT — REFRACTION_AUTOREFRACTION
OS_SPHERE: +0.75
OS_CYLINDER: -1.25
OS_AXIS: 050
OD_AXIS: 108
OD_SPHERE: +0.75
OD_CYLINDER: -1.25

## 2021-09-29 ASSESSMENT — REFRACTION_MANIFEST
OD_VA2: 20/20(J1+)
OU_VA: 20/25
OD_VA1: 0/30+
OS_VA1: 0/30
OD_AXIS: 095
OD_SPHERE: +0.75
OS_VA2: 20/20(J1+)
OS_CYLINDER: -0.75
OS_SPHERE: +0.50
OD_CYLINDER: -0.75
OD_ADD: +3.00
OS_ADD: +3.00
OS_AXIS: 060

## 2021-09-29 ASSESSMENT — SUPERFICIAL PUNCTATE KERATITIS (SPK)
OD_SPK: 2+
OS_SPK: 2+

## 2021-09-29 ASSESSMENT — VISUAL ACUITY
OS_BCVA: 20/40+
OD_BCVA: 20/30

## 2021-09-29 ASSESSMENT — TONOMETRY
OD_IOP_MMHG: 14
OS_IOP_MMHG: 14

## 2022-01-03 ENCOUNTER — OFFICE (OUTPATIENT)
Dept: URBAN - METROPOLITAN AREA CLINIC 28 | Facility: CLINIC | Age: 87
Setting detail: OPHTHALMOLOGY
End: 2022-01-03
Payer: MEDICARE

## 2022-01-03 DIAGNOSIS — Z96.1: ICD-10-CM

## 2022-01-03 DIAGNOSIS — H43.393: ICD-10-CM

## 2022-01-03 DIAGNOSIS — H16.223: ICD-10-CM

## 2022-01-03 DIAGNOSIS — H40.1131: ICD-10-CM

## 2022-01-03 PROCEDURE — 92133 CPTRZD OPH DX IMG PST SGM ON: CPT | Performed by: OPHTHALMOLOGY

## 2022-01-03 PROCEDURE — 76514 ECHO EXAM OF EYE THICKNESS: CPT | Performed by: OPHTHALMOLOGY

## 2022-01-03 PROCEDURE — 92014 COMPRE OPH EXAM EST PT 1/>: CPT | Performed by: OPHTHALMOLOGY

## 2022-01-03 PROCEDURE — 92202 OPSCPY EXTND ON/MAC DRAW: CPT | Performed by: OPHTHALMOLOGY

## 2022-01-03 ASSESSMENT — REFRACTION_MANIFEST
OD_AXIS: 095
OD_VA2: 20/20(J1+)
OD_VA1: 0/30+
OD_SPHERE: +0.75
OD_CYLINDER: -0.75
OU_VA: 20/25
OS_CYLINDER: -0.75
OD_ADD: +3.00
OS_SPHERE: +0.50
OS_VA1: 0/30
OS_ADD: +3.00
OS_VA2: 20/20(J1+)
OS_AXIS: 060

## 2022-01-03 ASSESSMENT — SPHEQUIV_DERIVED
OD_SPHEQUIV: 0.25
OS_SPHEQUIV: -0.125
OD_SPHEQUIV: 0.375
OS_SPHEQUIV: 0.125

## 2022-01-03 ASSESSMENT — REFRACTION_CURRENTRX
OS_SPHERE: +2.12
OD_OVR_VA: 20/
OD_ADD: +2.75
OS_OVR_VA: 20/
OD_AXIS: 009
OS_VPRISM_DIRECTION: PROGS
OD_SPHERE: +0.75
OS_AXIS: 153
OD_OVR_VA: 20/
OS_SPHERE: PLANO
OD_VPRISM_DIRECTION: PROGS
OD_CYLINDER: +0.75
OD_CYLINDER: -0.75
OD_CYLINDER: -1.00
OS_OVR_VA: 20/
OS_SPHERE: +0.50
OS_CYLINDER: +0.75
OS_ADD: +3.00
OD_ADD: +3.00
OS_ADD: +3.00
OS_CYLINDER: -0.75
OS_VPRISM_DIRECTION: PROGS
OD_SPHERE: +2.12
OD_OVR_VA: 20/
OD_AXIS: 086
OS_AXIS: 079
OS_CYLINDER: -1.25
OS_OVR_VA: 20/
OD_ADD: +3.00
OD_AXIS: 100
OS_ADD: +2.75
OD_SPHERE: PLANO
OS_VPRISM_DIRECTION: PROGS
OS_AXIS: 055
OD_VPRISM_DIRECTION: PROGS
OD_VPRISM_DIRECTION: PROGS

## 2022-01-03 ASSESSMENT — REFRACTION_AUTOREFRACTION
OD_SPHERE: +0.75
OS_SPHERE: +0.50
OD_CYLINDER: -1.00
OS_CYLINDER: -1.25
OS_AXIS: 041
OD_AXIS: 109

## 2022-01-03 ASSESSMENT — CONFRONTATIONAL VISUAL FIELD TEST (CVF)
OS_FINDINGS: FULL
OD_FINDINGS: FULL

## 2022-01-03 ASSESSMENT — KERATOMETRY
OS_K2POWER_DIOPTERS: 44.00
OD_K2POWER_DIOPTERS: 43.75
OD_AXISANGLE_DEGREES: 017
OD_K1POWER_DIOPTERS: 42.75
OS_AXISANGLE_DEGREES: 139
OS_K1POWER_DIOPTERS: 43.25

## 2022-01-03 ASSESSMENT — PACHYMETRY
OD_CT_CORRECTION: -1
OD_CT_UM: 556
OS_CT_CORRECTION: -1
OS_CT_UM: 552

## 2022-01-03 ASSESSMENT — AXIALLENGTH_DERIVED
OD_AL: 23.5863
OS_AL: 23.4977
OD_AL: 23.5378
OS_AL: 23.5947

## 2022-01-03 ASSESSMENT — VISUAL ACUITY
OS_BCVA: 20/30-2
OD_BCVA: 20/30-2

## 2022-01-03 ASSESSMENT — TONOMETRY
OD_IOP_MMHG: 13
OS_IOP_MMHG: 12

## 2022-01-03 ASSESSMENT — SUPERFICIAL PUNCTATE KERATITIS (SPK)
OS_SPK: 2+
OD_SPK: 1+ 2+

## 2022-05-24 ENCOUNTER — OFFICE (OUTPATIENT)
Dept: URBAN - METROPOLITAN AREA CLINIC 28 | Facility: CLINIC | Age: 87
Setting detail: OPHTHALMOLOGY
End: 2022-05-24
Payer: MEDICARE

## 2022-05-24 ENCOUNTER — RX ONLY (RX ONLY)
Age: 87
End: 2022-05-24

## 2022-05-24 DIAGNOSIS — H16.223: ICD-10-CM

## 2022-05-24 DIAGNOSIS — H40.1131: ICD-10-CM

## 2022-05-24 PROBLEM — Z96.1: Status: ACTIVE | Noted: 2021-08-02

## 2022-05-24 PROBLEM — H43.393 VITREOUS FLOATERS; BOTH EYES: Status: ACTIVE | Noted: 2022-01-03

## 2022-05-24 PROCEDURE — 92012 INTRM OPH EXAM EST PATIENT: CPT | Performed by: OPHTHALMOLOGY

## 2022-05-24 PROCEDURE — 92083 EXTENDED VISUAL FIELD XM: CPT | Performed by: OPHTHALMOLOGY

## 2022-05-24 PROCEDURE — 68761 CLOSE TEAR DUCT OPENING: CPT | Performed by: OPHTHALMOLOGY

## 2022-05-24 ASSESSMENT — PACHYMETRY
OS_CT_UM: 552
OD_CT_UM: 556
OS_CT_CORRECTION: -1
OD_CT_CORRECTION: -1

## 2022-05-24 ASSESSMENT — REFRACTION_CURRENTRX
OS_AXIS: 087
OS_ADD: +2.75
OS_CYLINDER: +0.75
OD_SPHERE: PLANO
OS_AXIS: 055
OD_VPRISM_DIRECTION: PROGS
OS_ADD: +3.00
OS_VPRISM_DIRECTION: PROGS
OD_CYLINDER: -0.75
OD_SPHERE: +0.75
OS_OVR_VA: 20/
OS_AXIS: 079
OD_AXIS: 009
OD_VPRISM_DIRECTION: PROGS
OD_OVR_VA: 20/
OD_OVR_VA: 20/
OS_VPRISM_DIRECTION: PROGS
OS_AXIS: 153
OS_OVR_VA: 20/
OD_AXIS: 100
OD_SPHERE: +2.25
OD_ADD: +3.00
OS_SPHERE: +0.50
OS_ADD: +3.00
OD_OVR_VA: 20/
OS_CYLINDER: -0.75
OS_SPHERE: PLANO
OD_ADD: +2.75
OS_CYLINDER: -1.25
OS_CYLINDER: -0.75
OD_CYLINDER: -1.25
OS_OVR_VA: 20/
OD_SPHERE: +2.12
OS_SPHERE: +1.75
OD_VPRISM_DIRECTION: PROGS
OD_CYLINDER: -1.00
OS_VPRISM_DIRECTION: PROGS
OD_AXIS: 081
OD_CYLINDER: +0.75
OD_AXIS: 086
OD_ADD: +3.00
OS_SPHERE: +2.12

## 2022-05-24 ASSESSMENT — REFRACTION_MANIFEST
OD_SPHERE: +0.75
OS_VA1: 0/30
OS_ADD: +3.00
OD_VA1: 0/30+
OD_VA2: 20/20(J1+)
OS_SPHERE: +0.50
OS_CYLINDER: -0.75
OD_ADD: +3.00
OS_VA2: 20/20(J1+)
OD_CYLINDER: -0.75
OD_AXIS: 095
OU_VA: 20/25
OS_AXIS: 060

## 2022-05-24 ASSESSMENT — KERATOMETRY
OS_K1POWER_DIOPTERS: 42.50
OD_AXISANGLE_DEGREES: 022
OD_K2POWER_DIOPTERS: 44.00
OS_AXISANGLE_DEGREES: 140
OD_K1POWER_DIOPTERS: 42.50
OS_K2POWER_DIOPTERS: 44.00

## 2022-05-24 ASSESSMENT — SUPERFICIAL PUNCTATE KERATITIS (SPK)
OD_SPK: 2+
OS_SPK: 2+

## 2022-05-24 ASSESSMENT — REFRACTION_AUTOREFRACTION
OS_CYLINDER: -1.25
OD_CYLINDER: -1.25
OS_AXIS: 044
OD_AXIS: 113
OD_SPHERE: +0.50
OS_SPHERE: +0.75

## 2022-05-24 ASSESSMENT — AXIALLENGTH_DERIVED
OD_AL: 23.7333
OS_AL: 23.6351
OD_AL: 23.5378
OS_AL: 23.6351

## 2022-05-24 ASSESSMENT — SPHEQUIV_DERIVED
OS_SPHEQUIV: 0.125
OD_SPHEQUIV: 0.375
OS_SPHEQUIV: 0.125
OD_SPHEQUIV: -0.125

## 2022-05-24 ASSESSMENT — VISUAL ACUITY
OD_BCVA: 20/40
OS_BCVA: 20/40

## 2022-05-24 ASSESSMENT — TONOMETRY
OD_IOP_MMHG: 13
OS_IOP_MMHG: 13

## 2022-08-05 PROCEDURE — 87086 URINE CULTURE/COLONY COUNT: CPT | Performed by: NURSE PRACTITIONER

## 2022-09-07 ENCOUNTER — OFFICE (OUTPATIENT)
Dept: URBAN - METROPOLITAN AREA CLINIC 28 | Facility: CLINIC | Age: 87
Setting detail: OPHTHALMOLOGY
End: 2022-09-07
Payer: MEDICARE

## 2022-09-07 DIAGNOSIS — H16.222: ICD-10-CM

## 2022-09-07 DIAGNOSIS — H43.393: ICD-10-CM

## 2022-09-07 DIAGNOSIS — H16.221: ICD-10-CM

## 2022-09-07 DIAGNOSIS — Z96.1: ICD-10-CM

## 2022-09-07 DIAGNOSIS — H40.1131: ICD-10-CM

## 2022-09-07 DIAGNOSIS — H16.223: ICD-10-CM

## 2022-09-07 PROCEDURE — 92014 COMPRE OPH EXAM EST PT 1/>: CPT | Performed by: OPHTHALMOLOGY

## 2022-09-07 PROCEDURE — 92133 CPTRZD OPH DX IMG PST SGM ON: CPT | Performed by: OPHTHALMOLOGY

## 2022-09-07 PROCEDURE — 83861 MICROFLUID ANALY TEARS: CPT | Performed by: OPHTHALMOLOGY

## 2022-09-07 ASSESSMENT — CONFRONTATIONAL VISUAL FIELD TEST (CVF)
OD_FINDINGS: FULL
OS_FINDINGS: FULL

## 2022-09-07 ASSESSMENT — PACHYMETRY
OS_CT_CORRECTION: -1
OD_CT_CORRECTION: -1
OD_CT_UM: 556
OS_CT_UM: 552

## 2022-09-07 ASSESSMENT — TONOMETRY
OS_IOP_MMHG: 15
OD_IOP_MMHG: 13

## 2022-09-08 ASSESSMENT — SUPERFICIAL PUNCTATE KERATITIS (SPK)
OS_SPK: 2+
OD_SPK: 1+

## 2022-09-08 ASSESSMENT — REFRACTION_CURRENTRX
OS_SPHERE: +1.25
OD_ADD: +2.75
OS_OVR_VA: 20/
OS_AXIS: 053
OS_SPHERE: +1.75
OS_SPHERE: PLANO
OD_SPHERE: +0.75
OS_OVR_VA: 20/
OS_AXIS: 087
OD_AXIS: 081
OD_OVR_VA: 20/
OS_CYLINDER: +0.75
OS_ADD: +2.75
OD_ADD: +3.00
OD_OVR_VA: 20/
OS_CYLINDER: -0.75
OD_ADD: +2.75
OS_AXIS: 153
OS_VPRISM_DIRECTION: PROGS
OD_OVR_VA: 20/
OS_SPHERE: +0.50
OD_SPHERE: PLANO
OS_CYLINDER: -1.25
OS_VPRISM_DIRECTION: PROGS
OS_ADD: +3.00
OD_CYLINDER: -1.25
OD_AXIS: 009
OS_ADD: +3.00
OS_CYLINDER: -1.50
OS_AXIS: 079
OD_ADD: +3.00
OD_CYLINDER: -0.75
OD_AXIS: 103
OD_VPRISM_DIRECTION: PROGS
OS_VPRISM_DIRECTION: PROGS
OS_CYLINDER: -0.75
OD_SPHERE: +0.75
OS_ADD: +3.00
OD_CYLINDER: -1.00
OD_AXIS: 086
OS_SPHERE: +2.12
OD_SPHERE: +2.12
OD_AXIS: 100
OD_CYLINDER: -0.75
OD_VPRISM_DIRECTION: PROGS
OS_AXIS: 055
OD_VPRISM_DIRECTION: PROGS
OS_OVR_VA: 20/
OD_CYLINDER: +0.75
OD_SPHERE: +2.25

## 2022-09-08 ASSESSMENT — REFRACTION_AUTOREFRACTION
OS_SPHERE: +0.50
OD_CYLINDER: -1.75
OS_AXIS: 045
OS_CYLINDER: -1.25
OD_AXIS: 111
OD_SPHERE: +0.75

## 2022-09-08 ASSESSMENT — REFRACTION_MANIFEST
OS_AXIS: 060
OD_SPHERE: +0.75
OD_VA2: 20/20(J1+)
OS_SPHERE: +0.50
OS_VA1: 0/30
OD_CYLINDER: -0.75
OD_AXIS: 095
OD_ADD: +3.00
OD_VA1: 0/30+
OS_CYLINDER: -0.75
OS_VA2: 20/20(J1+)
OU_VA: 20/25
OS_ADD: +3.00

## 2022-09-08 ASSESSMENT — KERATOMETRY
OS_K2POWER_DIOPTERS: 44.00
OD_AXISANGLE_DEGREES: 014
OS_K1POWER_DIOPTERS: 42.75
OS_AXISANGLE_DEGREES: 143
OD_K2POWER_DIOPTERS: 43.75
OD_K1POWER_DIOPTERS: 42.50

## 2022-09-08 ASSESSMENT — SPHEQUIV_DERIVED
OD_SPHEQUIV: 0.375
OS_SPHEQUIV: -0.125
OS_SPHEQUIV: 0.125
OD_SPHEQUIV: -0.125

## 2022-09-08 ASSESSMENT — AXIALLENGTH_DERIVED
OS_AL: 23.5891
OD_AL: 23.7798
OS_AL: 23.6869
OD_AL: 23.5835

## 2022-09-08 ASSESSMENT — VISUAL ACUITY
OS_BCVA: 20/50
OD_BCVA: 20/40-2

## 2022-10-19 PROBLEM — H16.223 DRY EYE SYNDROME K SICCA; RIGHT EYE, LEFT EYE, BOTH EYES: Status: ACTIVE | Noted: 2022-09-22

## 2022-10-19 PROBLEM — H16.221 DRY EYE SYNDROME K SICCA; RIGHT EYE, LEFT EYE, BOTH EYES: Status: ACTIVE | Noted: 2022-09-22

## 2022-10-19 PROBLEM — H16.222 DRY EYE SYNDROME K SICCA; RIGHT EYE, LEFT EYE, BOTH EYES: Status: ACTIVE | Noted: 2022-09-22

## 2024-02-14 ENCOUNTER — HOSPITAL ENCOUNTER (EMERGENCY)
Facility: HOSPITAL | Age: 89
Discharge: HOME | End: 2024-02-15
Attending: EMERGENCY MEDICINE | Admitting: EMERGENCY MEDICINE
Payer: MEDICARE

## 2024-02-14 ENCOUNTER — APPOINTMENT (OUTPATIENT)
Dept: GENERAL RADIOLOGY | Facility: HOSPITAL | Age: 89
End: 2024-02-14
Payer: MEDICARE

## 2024-02-14 ENCOUNTER — APPOINTMENT (OUTPATIENT)
Dept: CT IMAGING | Facility: HOSPITAL | Age: 89
End: 2024-02-14
Payer: MEDICARE

## 2024-02-14 DIAGNOSIS — R53.1 GENERALIZED WEAKNESS: Primary | ICD-10-CM

## 2024-02-14 DIAGNOSIS — R91.8 OPACITY OF LUNG ON IMAGING STUDY: ICD-10-CM

## 2024-02-14 LAB
ALBUMIN SERPL-MCNC: 3.7 G/DL (ref 3.5–5.2)
ALBUMIN/GLOB SERPL: 1.2 G/DL
ALP SERPL-CCNC: 71 U/L (ref 39–117)
ALT SERPL W P-5'-P-CCNC: 37 U/L (ref 1–33)
ANION GAP SERPL CALCULATED.3IONS-SCNC: 13 MMOL/L (ref 5–15)
AST SERPL-CCNC: 40 U/L (ref 1–32)
BASOPHILS # BLD AUTO: 0.03 10*3/MM3 (ref 0–0.2)
BASOPHILS NFR BLD AUTO: 0.4 % (ref 0–1.5)
BILIRUB SERPL-MCNC: 0.2 MG/DL (ref 0–1.2)
BILIRUB UR QL STRIP: NEGATIVE
BUN SERPL-MCNC: 19 MG/DL (ref 8–23)
BUN/CREAT SERPL: 20.4 (ref 7–25)
CALCIUM SPEC-SCNC: 9.7 MG/DL (ref 8.6–10.5)
CHLORIDE SERPL-SCNC: 101 MMOL/L (ref 98–107)
CLARITY UR: CLEAR
CO2 SERPL-SCNC: 22 MMOL/L (ref 22–29)
COLOR UR: YELLOW
CREAT SERPL-MCNC: 0.93 MG/DL (ref 0.57–1)
D-LACTATE SERPL-SCNC: 1.9 MMOL/L (ref 0.5–2)
D-LACTATE SERPL-SCNC: 4.1 MMOL/L (ref 0.5–2)
DEPRECATED RDW RBC AUTO: 52.4 FL (ref 37–54)
EGFRCR SERPLBLD CKD-EPI 2021: 58.9 ML/MIN/1.73
EOSINOPHIL # BLD AUTO: 0.07 10*3/MM3 (ref 0–0.4)
EOSINOPHIL NFR BLD AUTO: 0.8 % (ref 0.3–6.2)
ERYTHROCYTE [DISTWIDTH] IN BLOOD BY AUTOMATED COUNT: 15.1 % (ref 12.3–15.4)
GLOBULIN UR ELPH-MCNC: 3 GM/DL
GLUCOSE SERPL-MCNC: 105 MG/DL (ref 65–99)
GLUCOSE UR STRIP-MCNC: NEGATIVE MG/DL
HCT VFR BLD AUTO: 49.8 % (ref 34–46.6)
HGB BLD-MCNC: 15.3 G/DL (ref 12–15.9)
HGB UR QL STRIP.AUTO: NEGATIVE
IMM GRANULOCYTES # BLD AUTO: 0.06 10*3/MM3 (ref 0–0.05)
IMM GRANULOCYTES NFR BLD AUTO: 0.7 % (ref 0–0.5)
KETONES UR QL STRIP: NEGATIVE
LEUKOCYTE ESTERASE UR QL STRIP.AUTO: NEGATIVE
LIPASE SERPL-CCNC: 49 U/L (ref 13–60)
LYMPHOCYTES # BLD AUTO: 1.37 10*3/MM3 (ref 0.7–3.1)
LYMPHOCYTES NFR BLD AUTO: 16.6 % (ref 19.6–45.3)
MCH RBC QN AUTO: 29.4 PG (ref 26.6–33)
MCHC RBC AUTO-ENTMCNC: 30.7 G/DL (ref 31.5–35.7)
MCV RBC AUTO: 95.6 FL (ref 79–97)
MONOCYTES # BLD AUTO: 1 10*3/MM3 (ref 0.1–0.9)
MONOCYTES NFR BLD AUTO: 12.1 % (ref 5–12)
NEUTROPHILS NFR BLD AUTO: 5.71 10*3/MM3 (ref 1.7–7)
NEUTROPHILS NFR BLD AUTO: 69.4 % (ref 42.7–76)
NITRITE UR QL STRIP: NEGATIVE
NRBC BLD AUTO-RTO: 0 /100 WBC (ref 0–0.2)
NT-PROBNP SERPL-MCNC: 551.3 PG/ML (ref 0–1800)
PH UR STRIP.AUTO: 5.5 [PH] (ref 5–8)
PLATELET # BLD AUTO: 250 10*3/MM3 (ref 140–450)
PMV BLD AUTO: 9.5 FL (ref 6–12)
POTASSIUM SERPL-SCNC: 4.5 MMOL/L (ref 3.5–5.2)
PROT SERPL-MCNC: 6.7 G/DL (ref 6–8.5)
PROT UR QL STRIP: NEGATIVE
RBC # BLD AUTO: 5.21 10*6/MM3 (ref 3.77–5.28)
SODIUM SERPL-SCNC: 136 MMOL/L (ref 136–145)
SP GR UR STRIP: 1.01 (ref 1–1.03)
TROPONIN T SERPL HS-MCNC: 12 NG/L
UROBILINOGEN UR QL STRIP: NORMAL
WBC NRBC COR # BLD AUTO: 8.24 10*3/MM3 (ref 3.4–10.8)

## 2024-02-14 PROCEDURE — 81003 URINALYSIS AUTO W/O SCOPE: CPT | Performed by: PHYSICIAN ASSISTANT

## 2024-02-14 PROCEDURE — 25810000003 SODIUM CHLORIDE 0.9 % SOLUTION: Performed by: PHYSICIAN ASSISTANT

## 2024-02-14 PROCEDURE — 71045 X-RAY EXAM CHEST 1 VIEW: CPT

## 2024-02-14 PROCEDURE — 83690 ASSAY OF LIPASE: CPT | Performed by: PHYSICIAN ASSISTANT

## 2024-02-14 PROCEDURE — 83880 ASSAY OF NATRIURETIC PEPTIDE: CPT | Performed by: PHYSICIAN ASSISTANT

## 2024-02-14 PROCEDURE — 36415 COLL VENOUS BLD VENIPUNCTURE: CPT

## 2024-02-14 PROCEDURE — 99284 EMERGENCY DEPT VISIT MOD MDM: CPT

## 2024-02-14 PROCEDURE — 70450 CT HEAD/BRAIN W/O DYE: CPT

## 2024-02-14 PROCEDURE — 84484 ASSAY OF TROPONIN QUANT: CPT | Performed by: PHYSICIAN ASSISTANT

## 2024-02-14 PROCEDURE — 71250 CT THORAX DX C-: CPT

## 2024-02-14 PROCEDURE — 93005 ELECTROCARDIOGRAM TRACING: CPT | Performed by: PHYSICIAN ASSISTANT

## 2024-02-14 PROCEDURE — 85025 COMPLETE CBC W/AUTO DIFF WBC: CPT | Performed by: PHYSICIAN ASSISTANT

## 2024-02-14 PROCEDURE — 83605 ASSAY OF LACTIC ACID: CPT | Performed by: PHYSICIAN ASSISTANT

## 2024-02-14 PROCEDURE — 80053 COMPREHEN METABOLIC PANEL: CPT | Performed by: PHYSICIAN ASSISTANT

## 2024-02-14 RX ADMIN — SODIUM CHLORIDE 500 ML: 9 INJECTION, SOLUTION INTRAVENOUS at 21:01

## 2024-02-15 VITALS
RESPIRATION RATE: 20 BRPM | SYSTOLIC BLOOD PRESSURE: 120 MMHG | OXYGEN SATURATION: 96 % | TEMPERATURE: 97.8 F | BODY MASS INDEX: 23.92 KG/M2 | HEART RATE: 58 BPM | DIASTOLIC BLOOD PRESSURE: 53 MMHG | HEIGHT: 62 IN | WEIGHT: 130 LBS

## 2024-02-15 NOTE — ED PROVIDER NOTES
EMERGENCY DEPARTMENT ENCOUNTER    Pt Name: Michelle Hoff  MRN: [id number]  Pt :   1935  Room Number:    Date of encounter:  2024  PCP: Alma Casanova MD  ED Provider: BRANDAN Herrera    Historian: Patient    HPI:  Chief Complaint: Weakness    Context: Michelle Hoff is a 89 y.o. female who presents to the ED c/o weakness.  Family reports that today at approximately 6:30 PM this evening the caregiver felt as if the patient seemed to be slightly weaker on her left side particularly her left arm.  Family was concerned and presents to the ER for further evaluation.  On interview and exam patient without appreciable weakness.  No focal deficits.  Patient's only complaint is an abrasion to her right leg that occurred when she was getting out of the car.  Family notes that patient has history of a recent UTI and is currently on her last dose of ciprofloxacin.  Patient has no history of stroke.  She is not on blood thinners.  Patient with significant history of hypertension.  Patient also history of atrial fibrillation.  HPI     REVIEW OF SYSTEMS  A chief complaint appropriate review of systems was completed and is negative except as noted in the HPI.     PAST MEDICAL HISTORY  Past Medical History:   Diagnosis Date    Arthritis     Compression fracture of body of thoracic vertebra     Fracture     T9    Glaucoma     Hypertension     Macular degeneration     Skin tear of left lower leg without complication        PAST SURGICAL HISTORY  Past Surgical History:   Procedure Laterality Date    CATARACT EXTRACTION      COLONOSCOPY  APROX AT AGE 80    KYPHOPLASTY N/A 10/10/2019    Procedure: KYPHOPLASTY  T10, L1;  Surgeon: Fausto Horton MD;  Location: [facility];  Service: Neurosurgery       FAMILY HISTORY  History reviewed. No pertinent family history.    SOCIAL HISTORY  Social History     Socioeconomic History    Marital status:    Tobacco Use    Smoking status: Former     Types:  Cigarettes     Quit date:      Years since quittin.1    Smokeless tobacco: Never   Substance and Sexual Activity    Alcohol use: Yes     Alcohol/week: 1.0 - 2.0 standard drink of alcohol     Types: 1 - 2 Glasses of wine per week    Drug use: No    Sexual activity: Defer       ALLERGIES  Augmentin [amoxicillin-pot clavulanate], Cardizem [diltiazem hcl], Metoprolol, and Adhesive tape    PHYSICAL EXAM  Physical Exam  Vitals and nursing note reviewed.   Constitutional:       General: She is not in acute distress.     Appearance: Normal appearance. She is not ill-appearing or toxic-appearing.   HENT:      Head: Normocephalic and atraumatic.      Nose: Nose normal.      Mouth/Throat:      Mouth: Mucous membranes are moist.   Eyes:      Extraocular Movements: Extraocular movements intact.   Cardiovascular:      Rate and Rhythm: Normal rate and regular rhythm.      Pulses: Normal pulses.   Pulmonary:      Effort: Pulmonary effort is normal.      Breath sounds: Normal breath sounds.   Abdominal:      General: There is no distension.      Tenderness: There is no abdominal tenderness.   Musculoskeletal:         General: Normal range of motion.      Cervical back: Normal range of motion and neck supple.   Skin:     General: Skin is warm and dry.   Neurological:      General: No focal deficit present.      Mental Status: She is alert.      Cranial Nerves: Cranial nerves 2-12 are intact.      Sensory: Sensation is intact. No sensory deficit.      Motor: Motor function is intact. No weakness, abnormal muscle tone or pronator drift.      Coordination: Coordination is intact. Finger-Nose-Finger Test normal.   Psychiatric:         Mood and Affect: Mood normal.         Behavior: Behavior normal.         LAB RESULTS  Results for orders placed or performed during the hospital encounter of 24   Comprehensive Metabolic Panel    Specimen: Blood   Result Value Ref Range    Glucose 105 (H) 65 - 99 mg/dL    BUN 19 8 - 23 mg/dL     Creatinine 0.93 0.57 - 1.00 mg/dL    Sodium 136 136 - 145 mmol/L    Potassium 4.5 3.5 - 5.2 mmol/L    Chloride 101 98 - 107 mmol/L    CO2 22.0 22.0 - 29.0 mmol/L    Calcium 9.7 8.6 - 10.5 mg/dL    Total Protein 6.7 6.0 - 8.5 g/dL    Albumin 3.7 3.5 - 5.2 g/dL    ALT (SGPT) 37 (H) 1 - 33 U/L    AST (SGOT) 40 (H) 1 - 32 U/L    Alkaline Phosphatase 71 39 - 117 U/L    Total Bilirubin 0.2 0.0 - 1.2 mg/dL    Globulin 3.0 gm/dL    A/G Ratio 1.2 g/dL    BUN/Creatinine Ratio 20.4 7.0 - 25.0    Anion Gap 13.0 5.0 - 15.0 mmol/L    eGFR 58.9 (L) >60.0 mL/min/1.73   Lipase    Specimen: Blood   Result Value Ref Range    Lipase 49 13 - 60 U/L   Urinalysis With Microscopic If Indicated (No Culture) - Urine, Clean Catch    Specimen: Urine, Clean Catch   Result Value Ref Range    Color, UA Yellow Yellow, Straw    Appearance, UA Clear Clear    pH, UA 5.5 5.0 - 8.0    Specific Gravity, UA 1.015 1.001 - 1.030    Glucose, UA Negative Negative    Ketones, UA Negative Negative    Bilirubin, UA Negative Negative    Blood, UA Negative Negative    Protein, UA Negative Negative    Leuk Esterase, UA Negative Negative    Nitrite, UA Negative Negative    Urobilinogen, UA 0.2 E.U./dL 0.2 - 1.0 E.U./dL   BNP    Specimen: Blood   Result Value Ref Range    proBNP 551.3 0.0 - 1,800.0 pg/mL   Single High Sensitivity Troponin T    Specimen: Blood   Result Value Ref Range    HS Troponin T 12 <14 ng/L   Lactic Acid, Plasma    Specimen: Blood   Result Value Ref Range    Lactate 4.1 (C) 0.5 - 2.0 mmol/L   CBC Auto Differential    Specimen: Blood   Result Value Ref Range    WBC 8.24 3.40 - 10.80 10*3/mm3    RBC 5.21 3.77 - 5.28 10*6/mm3    Hemoglobin 15.3 12.0 - 15.9 g/dL    Hematocrit 49.8 (H) 34.0 - 46.6 %    MCV 95.6 79.0 - 97.0 fL    MCH 29.4 26.6 - 33.0 pg    MCHC 30.7 (L) 31.5 - 35.7 g/dL    RDW 15.1 12.3 - 15.4 %    RDW-SD 52.4 37.0 - 54.0 fl    MPV 9.5 6.0 - 12.0 fL    Platelets 250 140 - 450 10*3/mm3    Neutrophil % 69.4 42.7 - 76.0 %     Lymphocyte % 16.6 (L) 19.6 - 45.3 %    Monocyte % 12.1 (H) 5.0 - 12.0 %    Eosinophil % 0.8 0.3 - 6.2 %    Basophil % 0.4 0.0 - 1.5 %    Immature Grans % 0.7 (H) 0.0 - 0.5 %    Neutrophils, Absolute 5.71 1.70 - 7.00 10*3/mm3    Lymphocytes, Absolute 1.37 0.70 - 3.10 10*3/mm3    Monocytes, Absolute 1.00 (H) 0.10 - 0.90 10*3/mm3    Eosinophils, Absolute 0.07 0.00 - 0.40 10*3/mm3    Basophils, Absolute 0.03 0.00 - 0.20 10*3/mm3    Immature Grans, Absolute 0.06 (H) 0.00 - 0.05 10*3/mm3    nRBC 0.0 0.0 - 0.2 /100 WBC   STAT Lactic Acid, Reflex    Specimen: Blood   Result Value Ref Range    Lactate 1.9 0.5 - 2.0 mmol/L   ECG 12 Lead Other; Generalized weakness   Result Value Ref Range    QT Interval 356 ms    QTC Interval 430 ms       If labs were ordered, I independently reviewed the results and considered them in treating the patient.    RADIOLOGY  CT Head Without Contrast   Final Result   1.Volume loss and suspected chronic ischemic changes, as above.   2.No definite CT findings of acute intracranial abnormality. MRI would be more sensitive for acute ischemia.   3.Bubbly fluid in the left sphenoid sinus which may be seen with sinusitis.         Electronically Signed: Onel Treviño     2/14/2024 10:02 PM EST     Workstation ID: [id number]      CT Chest Without Contrast Diagnostic   Final Result   Impression:   Focal dense opacity in the right lower lobe measuring 2.3 cm may represent an infectious/inflammatory process, although this is highly concerning for a discrete pulmonary lesion. Please consider further evaluation with nonemergent PET/CT or tissue    sampling. Alternatively a short-term follow-up CT in 3 months can be obtained to document resolution            Electronically Signed: Alonzo Chamberlain DO     2/14/2024 10:02 PM EST     Workstation ID: [id number]      XR Chest 1 View   Final Result   Impression:   No definite acute abnormality. Hazy opacity in the left lower lung most likely represents atelectasis.  Pulmonary infiltrates not completely excluded         Electronically Signed: Alonzo Chamberlain,      2/14/2024 8:27 PM EST     Workstation ID: [id number]        [x] Radiologist's Report Reviewed:  I ordered and independently interpreted the above noted radiographic studies.  See radiologist's dictation for official interpretation.      PROCEDURES    Procedures    ECG 12 Lead Other; Generalized weakness   Final Result   Test Reason : Other~   Blood Pressure :   */*   mmHG   Vent. Rate :  88 BPM     Atrial Rate :  88 BPM      P-R Int : 278 ms          QRS Dur :  70 ms       QT Int : 356 ms       P-R-T Axes :  66   4  60 degrees      QTc Int : 430 ms      Sinus rhythm with 1st degree AV block   Septal infarct , age undetermined   Abnormal ECG   When compared with ECG of 09-OCT-2019 11:19,   Nonspecific T wave abnormality no longer evident in Inferior leads   Confirmed by FILOMENA ESTEVEZ ([id number]) on 2/16/2024 10:12:30 AM      Referred By: EDMD           Confirmed By: FILOMENA ESTEVEZ          MEDICATIONS GIVEN IN ER    Medications   sodium chloride 0.9 % bolus 500 mL (0 mL Intravenous Stopped 2/14/24 7163)       MEDICAL DECISION MAKING, PROGRESS, and CONSULTS   Medical Decision Making  Problems Addressed:  Generalized weakness: complicated acute illness or injury    Amount and/or Complexity of Data Reviewed  Labs: ordered.  Radiology: ordered.  ECG/medicine tests: ordered.        All labs have been independently reviewed by me.  All radiology studies have been interpreted by me and the radiologist dictating the report.  All EKG's have been independently interpreted by me as well as and overseeing attending physician.    [] Discussed with radiology regarding test interpretation:    Discussion below represents my analysis of pertinent findings related to patient's condition, differential diagnosis, treatment plan and final disposition.    Differential diagnosis:  The differential diagnosis associated with the patient's  presentation includes: CVA, infection, infarction/ischemia, trauma, inflammation, syncope, sepsis, viral syndrome, electrolyte abnormality, respiratory failure, anemia, dehydration, hypothyroidism, polypharmacy and others.     Additional sources  Discussed/ obtained information from independent historians:   [] Spouse  [] Parent  [x] Family member  [] Friend  [] EMS   [] Other:  External (non-ED) record review:   [] Inpatient record:   [] Office record:   [] Outpatient record:   [] Prior Outpatient labs:   [] Prior Outpatient radiology:   [x] Primary Care record: Primary care record reviewed from 10/27/2023 where patient was seen and evaluated with diagnosis of Hypertension, Hyperlipidemia, arthritis   [] Outside ED record:   [] Other:   Patient's care impacted by:   [] Diabetes  [x] Hypertension  [x] Hyperlipidemia  [] Hypothyroidism   [] Coronary Artery Disease   [] COPD   [] Cancer   [] Obesity  [] GERD   [] Tobacco Abuse   [] Substance Abuse    [] Anxiety   [] Depression   [x] Other: Rheumatoid arthritis  Care significantly affected by Social Determinants of Health (housing and economic circumstances, unemployment)    [] Yes     [x] No   If yes, Patient's care significantly limited by  Social Determinants of Health including:   [] Inadequate housing   [] Low income   [] Alcoholism and drug addiction in family   [] Problems related to primary support group   [] Unemployment   [] Problems related to employment   [] Other Social Determinants of Health:     Shared decision making:  I had a discussion with the patient/family regarding diagnosis, diagnostic results, treatment plan, and medications.  The patient/family indicated understanding of these instructions.  I spent adequate time at the bedside preceding discharge necessary to personally discuss the aftercare instructions, giving patient education, providing explanations of the results of our evaluations/findings, and my decision making to assure that the  patient/family understand the plan of care.  Time was allotted to answer questions at that time and throughout the ED course.  Emphasis was placed on timely follow-up after discharge.  I also discussed the potential for the development of an acute emergent condition requiring further evaluation, admission, or even surgical intervention. I discussed that we found nothing during the visit today indicating the need for further workup, admission, or the presence of an unstable medical condition.  I encouraged the patient to return to the emergency department immediately for ANY concerns, worsening, new complaints, or if symptoms persist and unable to seek follow-up in a timely fashion.  The patient/family expressed understanding and agreement with this plan.  The patient will follow-up with primary care for reevaluation.      Orders placed during this visit:  Orders Placed This Encounter   Procedures    XR Chest 1 View    CT Head Without Contrast    CT Chest Without Contrast Diagnostic    Comprehensive Metabolic Panel    Lipase    Urinalysis With Microscopic If Indicated (No Culture) - Urine, Clean Catch    BNP    Single High Sensitivity Troponin T    Lactic Acid, Plasma    CBC Auto Differential    STAT Lactic Acid, Reflex    ECG 12 Lead Other; Generalized weakness    CBC & Differential       ED Course:    ED Course as of 02/18/24 1348   Thu Feb 15, 2024   0017 Labs studies were reviewed and directly interpreted by myself and demonstrated initial lactate of 4.1.  Remainder of labs and urinalysis without acute findings and consistent with previous results.  Patient responded well to IV fluids administered in the ED with repeat lactic 1.9. [JG]   0018 Vitals and Telemetry tracing was reviewed and directly interpreted by myself demonstrating blood pressure 120/53, afebrile, heart rate of 58, respirations of 20 4 maintaining oxygen saturation of 96% on room air.  EKG without evidence of acute ischemic changes personally  interpreted by myself with additionally review by attending as documented. [JG]   0019 CT scan of chest and CT head personally interpreted by myself with official read provided by radiology demonstrates no acute or emergent abnormalities. Incidental finding of focal dense opacity in the right lower lobe measuring 2.3 cm may represent an infectious/inflammatory process, although this is highly concerning for a discrete pulmonary lesion. This finding was reviewed with patient and family at bedside who will arrange follow up with patient's primary care. [JG]   0020 In summary this is an pleasant non-toxic appearing 89 year old female who presents to the ER for evaluation of weakness. No acute or emergent findings on physical exam, neurologically intact without appreciable focal deficits on exam. Initial lactate elevated with repeat within normal limits following IV fluids. Remainder of labs and UA without evidence of acute abnormalities. Imaging completed that also demonstrated no acute or emergent abnormalities. Incidental pulmonary finding which was reviewed and patient will follow up with PCP. Based on clinical presentation and workup in ED including labs and imaging, no clear indication for treatment beyond symptomatic management. Patient discharged. Given return precautions and showed understanding.  [JG]      ED Course User Index  [JG] Marvin Lezama PA            DIAGNOSIS  Final diagnoses:   Generalized weakness   Opacity of lung on imaging study       DISPOSITION    ED Disposition       ED Disposition   Discharge    Condition   Stable    Comment   --               Please note that portions of this document were completed with voice recognition software.        Marvin Lezama PA  02/18/24 2822

## 2024-02-16 LAB
QT INTERVAL: 356 MS
QTC INTERVAL: 430 MS

## 2024-06-25 ENCOUNTER — HOSPITAL ENCOUNTER (EMERGENCY)
Facility: HOSPITAL | Age: 89
Discharge: HOME | End: 2024-06-25
Attending: EMERGENCY MEDICINE | Admitting: EMERGENCY MEDICINE
Payer: MEDICARE

## 2024-06-25 ENCOUNTER — APPOINTMENT (OUTPATIENT)
Dept: GENERAL RADIOLOGY | Facility: HOSPITAL | Age: 89
End: 2024-06-25
Payer: MEDICARE

## 2024-06-25 VITALS
SYSTOLIC BLOOD PRESSURE: 171 MMHG | BODY MASS INDEX: 24.84 KG/M2 | DIASTOLIC BLOOD PRESSURE: 96 MMHG | OXYGEN SATURATION: 95 % | WEIGHT: 135 LBS | HEART RATE: 80 BPM | RESPIRATION RATE: 18 BRPM | HEIGHT: 62 IN | TEMPERATURE: 98.7 F

## 2024-06-25 DIAGNOSIS — R55 NEAR SYNCOPE: Primary | ICD-10-CM

## 2024-06-25 DIAGNOSIS — E87.6 HYPOKALEMIA: ICD-10-CM

## 2024-06-25 LAB
ALBUMIN SERPL-MCNC: 3.7 G/DL (ref 3.5–5.2)
ALBUMIN/GLOB SERPL: 1.3 G/DL
ALP SERPL-CCNC: 78 U/L (ref 39–117)
ALT SERPL W P-5'-P-CCNC: 30 U/L (ref 1–33)
ANION GAP SERPL CALCULATED.3IONS-SCNC: 18 MMOL/L (ref 5–15)
AST SERPL-CCNC: 41 U/L (ref 1–32)
BASOPHILS # BLD AUTO: 0.04 10*3/MM3 (ref 0–0.2)
BASOPHILS NFR BLD AUTO: 0.5 % (ref 0–1.5)
BILIRUB SERPL-MCNC: 0.3 MG/DL (ref 0–1.2)
BILIRUB UR QL STRIP: NEGATIVE
BUN SERPL-MCNC: 13 MG/DL (ref 8–23)
BUN/CREAT SERPL: 15.9 (ref 7–25)
CALCIUM SPEC-SCNC: 9.4 MG/DL (ref 8.6–10.5)
CHLORIDE SERPL-SCNC: 104 MMOL/L (ref 98–107)
CLARITY UR: CLEAR
CO2 SERPL-SCNC: 21 MMOL/L (ref 22–29)
COLOR UR: YELLOW
CREAT SERPL-MCNC: 0.82 MG/DL (ref 0.57–1)
DEPRECATED RDW RBC AUTO: 50.9 FL (ref 37–54)
EGFRCR SERPLBLD CKD-EPI 2021: 68.5 ML/MIN/1.73
EOSINOPHIL # BLD AUTO: 0.14 10*3/MM3 (ref 0–0.4)
EOSINOPHIL NFR BLD AUTO: 1.6 % (ref 0.3–6.2)
ERYTHROCYTE [DISTWIDTH] IN BLOOD BY AUTOMATED COUNT: 14.7 % (ref 12.3–15.4)
GLOBULIN UR ELPH-MCNC: 2.8 GM/DL
GLUCOSE SERPL-MCNC: 114 MG/DL (ref 65–99)
GLUCOSE UR STRIP-MCNC: NEGATIVE MG/DL
HCT VFR BLD AUTO: 48.4 % (ref 34–46.6)
HGB BLD-MCNC: 15.5 G/DL (ref 12–15.9)
HGB UR QL STRIP.AUTO: NEGATIVE
HOLD SPECIMEN: NORMAL
IMM GRANULOCYTES # BLD AUTO: 0.05 10*3/MM3 (ref 0–0.05)
IMM GRANULOCYTES NFR BLD AUTO: 0.6 % (ref 0–0.5)
KETONES UR QL STRIP: ABNORMAL
LEUKOCYTE ESTERASE UR QL STRIP.AUTO: NEGATIVE
LYMPHOCYTES # BLD AUTO: 1.24 10*3/MM3 (ref 0.7–3.1)
LYMPHOCYTES NFR BLD AUTO: 14.3 % (ref 19.6–45.3)
MAGNESIUM SERPL-MCNC: 2.2 MG/DL (ref 1.6–2.4)
MCH RBC QN AUTO: 30.5 PG (ref 26.6–33)
MCHC RBC AUTO-ENTMCNC: 32 G/DL (ref 31.5–35.7)
MCV RBC AUTO: 95.1 FL (ref 79–97)
MONOCYTES # BLD AUTO: 1.06 10*3/MM3 (ref 0.1–0.9)
MONOCYTES NFR BLD AUTO: 12.2 % (ref 5–12)
NEUTROPHILS NFR BLD AUTO: 6.13 10*3/MM3 (ref 1.7–7)
NEUTROPHILS NFR BLD AUTO: 70.8 % (ref 42.7–76)
NITRITE UR QL STRIP: NEGATIVE
NRBC BLD AUTO-RTO: 0 /100 WBC (ref 0–0.2)
PH UR STRIP.AUTO: 5.5 [PH] (ref 5–8)
PLATELET # BLD AUTO: 198 10*3/MM3 (ref 140–450)
PMV BLD AUTO: 9.7 FL (ref 6–12)
POTASSIUM SERPL-SCNC: 3.3 MMOL/L (ref 3.5–5.2)
PROT SERPL-MCNC: 6.5 G/DL (ref 6–8.5)
PROT UR QL STRIP: ABNORMAL
QT INTERVAL: 380 MS
QTC INTERVAL: 454 MS
RBC # BLD AUTO: 5.09 10*6/MM3 (ref 3.77–5.28)
SODIUM SERPL-SCNC: 143 MMOL/L (ref 136–145)
SP GR UR STRIP: 1.02 (ref 1–1.03)
TROPONIN T SERPL HS-MCNC: 17 NG/L
TROPONIN T SERPL HS-MCNC: 21 NG/L
UROBILINOGEN UR QL STRIP: ABNORMAL
WBC NRBC COR # BLD AUTO: 8.66 10*3/MM3 (ref 3.4–10.8)
WHOLE BLOOD HOLD COAG: NORMAL
WHOLE BLOOD HOLD SPECIMEN: NORMAL

## 2024-06-25 PROCEDURE — 36415 COLL VENOUS BLD VENIPUNCTURE: CPT

## 2024-06-25 PROCEDURE — 81003 URINALYSIS AUTO W/O SCOPE: CPT | Performed by: EMERGENCY MEDICINE

## 2024-06-25 PROCEDURE — 83735 ASSAY OF MAGNESIUM: CPT | Performed by: EMERGENCY MEDICINE

## 2024-06-25 PROCEDURE — 84484 ASSAY OF TROPONIN QUANT: CPT | Performed by: EMERGENCY MEDICINE

## 2024-06-25 PROCEDURE — 80053 COMPREHEN METABOLIC PANEL: CPT | Performed by: EMERGENCY MEDICINE

## 2024-06-25 PROCEDURE — 85025 COMPLETE CBC W/AUTO DIFF WBC: CPT | Performed by: EMERGENCY MEDICINE

## 2024-06-25 PROCEDURE — 25810000003 SODIUM CHLORIDE 0.9 % SOLUTION: Performed by: EMERGENCY MEDICINE

## 2024-06-25 PROCEDURE — 99284 EMERGENCY DEPT VISIT MOD MDM: CPT

## 2024-06-25 PROCEDURE — 93005 ELECTROCARDIOGRAM TRACING: CPT | Performed by: EMERGENCY MEDICINE

## 2024-06-25 PROCEDURE — 71045 X-RAY EXAM CHEST 1 VIEW: CPT

## 2024-06-25 RX ORDER — SODIUM CHLORIDE 0.9 % (FLUSH) 0.9 %
10 SYRINGE (ML) INJECTION AS NEEDED
Status: DISCONTINUED | OUTPATIENT
Start: 2024-06-25 | End: 2024-06-26 | Stop reason: HOSPADM

## 2024-06-25 RX ORDER — POTASSIUM CHLORIDE 750 MG/1
20 CAPSULE, EXTENDED RELEASE ORAL ONCE
Status: COMPLETED | OUTPATIENT
Start: 2024-06-25 | End: 2024-06-25

## 2024-06-25 RX ADMIN — SODIUM CHLORIDE 1000 ML: 9 INJECTION, SOLUTION INTRAVENOUS at 19:34

## 2024-06-25 RX ADMIN — POTASSIUM CHLORIDE 20 MEQ: 750 CAPSULE, EXTENDED RELEASE ORAL at 20:44

## 2024-06-26 NOTE — ED PROVIDER NOTES
Subjective   History of Present Illness  Is a 89-year-old female with past medical history of hypertension presented to the emergency department for a near syncopal episode.  The patient apparently gets this from time to time.  She is accompanied by her caregivers.  They state that they were standing up to get her into bed when she started getting very lightheaded.  They slowly lowered her to the ground.  She did not actually have any fall or full loss of consciousness.  Right now, the patient states that she is feeling better.  He denies any headache or change in vision.  No focal weakness.  No chest pain or shortness of breath.  Abdominal pain or vomiting    History provided by:  Patient   used: No        Review of Systems   Constitutional:  Negative for chills and fever.   HENT:  Negative for congestion, ear pain and sore throat.    Eyes:  Negative for visual disturbance.   Respiratory:  Negative for shortness of breath.    Cardiovascular:  Negative for chest pain.   Gastrointestinal:  Negative for abdominal pain.   Genitourinary:  Negative for difficulty urinating.   Musculoskeletal:  Negative for arthralgias.   Skin:  Negative for rash.   Neurological:  Positive for weakness. Negative for dizziness and numbness.   Psychiatric/Behavioral:  Negative for agitation.        Past Medical History:   Diagnosis Date    Arthritis     Compression fracture of body of thoracic vertebra     Fracture     T9    Glaucoma     Hypertension     Macular degeneration     Skin tear of left lower leg without complication        Allergies   Allergen Reactions    Augmentin [Amoxicillin-Pot Clavulanate] Unknown - High Severity    Cardizem [Diltiazem Hcl] Other (See Comments)     unknown    Metoprolol Other (See Comments)     unknown    Adhesive Tape Rash       Past Surgical History:   Procedure Laterality Date    CATARACT EXTRACTION      COLONOSCOPY  APROX AT AGE 80    KYPHOPLASTY N/A 10/10/2019    Procedure:  KYPHOPLASTY  T10, L1;  Surgeon: Fausto Horton MD;  Location: [facility];  Service: Neurosurgery       No family history on file.    Social History     Socioeconomic History    Marital status:    Tobacco Use    Smoking status: Former     Current packs/day: 0.00     Types: Cigarettes     Quit date:      Years since quittin.5    Smokeless tobacco: Never   Substance and Sexual Activity    Alcohol use: Yes     Alcohol/week: 1.0 - 2.0 standard drink of alcohol     Types: 1 - 2 Glasses of wine per week    Drug use: No    Sexual activity: Defer           Objective   Physical Exam  Vitals and nursing note reviewed.   Constitutional:       General: She is not in acute distress.     Appearance: She is not ill-appearing or toxic-appearing.   HENT:      Mouth/Throat:      Pharynx: No posterior oropharyngeal erythema.   Eyes:      Conjunctiva/sclera: Conjunctivae normal.      Pupils: Pupils are equal, round, and reactive to light.   Cardiovascular:      Rate and Rhythm: Normal rate and regular rhythm.   Pulmonary:      Effort: Pulmonary effort is normal. No respiratory distress.   Abdominal:      General: Abdomen is flat. There is no distension.      Palpations: There is no mass.      Tenderness: There is no abdominal tenderness. There is no guarding or rebound.   Musculoskeletal:         General: No deformity. Normal range of motion.   Skin:     General: Skin is warm.      Findings: No rash.   Neurological:      General: No focal deficit present.      Mental Status: She is alert and oriented to person, place, and time.      Motor: No weakness.         ECG 12 Lead      Date/Time: 2024 9:34 PM    Performed by: Florentin Pete MD  Authorized by: Florentin Pete MD  Interpreted by ED physician  Comparison: compared with previous ECG   Similar to previous ECG  Rhythm: sinus rhythm  Rate: normal  BPM: 86  Conduction: conduction normal  Other findings comments: Nonspecific ST changes  Clinical  impression: non-specific ECG  Comments: Interpretation:  EKG was directly visualized by myself, interpretations as documented in hospital course.               ED Course  ED Course as of 06/25/24 2137 Tue Jun 25, 2024 2136 BP: 165/82 [JK]   2136 Heart Rate: 88 [JK]   2136 Resp: 18 [JK]   2136 SpO2(!): 88 % [JK]   2136 Device (Oxygen Therapy): room air  Interpretation:  Patient's repeat vitals, telemetry tracing, and pulse oximetry tracing were directly viewed and interpreted by myself.  Normal sinus rhythm [JK]   2136 CBC & Differential(!) [JK]   2136 Magnesium [JK]   2136 Urinalysis With Microscopic If Indicated (No Culture) - Urine, Clean Catch(!) [JK]   2136 Comprehensive Metabolic Panel(!)  Interpretation:  Laboratory studies were reviewed and interpreted directly by myself.  CBC normal, magnesium normal, urinalysis unremarkable, CMP shows mild hypokalemia with potassium 3.3, initial troponin was 21, repeat was 17 [JK]   2136 XR Chest 1 View  Interpretation:  Imaging was directly visualized by myself, per my interpretations, chest x-ray showed right lower lobe pulmonary nodule. [JK]   2136 On reevaluation, the patient is feeling much better.  She is alert and appropriate.  Is been no syncope or seizures in the emergency department.  She is hemodynamically stable.  Nontoxic.  No respiratory distress.  Patient follow-up with PCP in 48 hours.  Given strict return precautions.  Verbalized understanding [JK]   2137 I had a discussion with the patient/family regarding diagnosis, diagnostic results, treatment plan, and medications. The patient/family indicated understanding of these instructions. I spent adequate time at the bedside prior to discharge necessary to discuss the aftercare instructions, giving patient education, providing explanations of the results of our evaluations/findings, and my decision making to assure that the patient/family understand the plan of care. Time was allotted to answer questions at  that time and throughout the ED course. Patient is required to maintain timely follow up, as discussed. I also discussed the potential for the development of an acute emergent condition requiring further evaluation, return to the ER, admission, or even surgical intervention.  I encouraged the patient to return to the emergency department immediately for any concerns, worsening symptoms, new complaints, or if symptoms persist and they are unable to seek follow-up in a timely fashion. The patient/family expressed understanding and agreement with this plan    Shared decision making:   After full review of the patient's clinical presentation, review of any work-up including but not limited to laboratory studies and radiology obtained, I had a discussion with the patient.  Treatment options were discussed as well as the risks, benefits and consequences.  I discussed all findings with the patient and family members if available.  During the discussion, treatment goals were understood by all as well as any misconceptions which were addressed with the patient.  Ample time was given for any questions they may have had.  They are in agreement with the treatment plan as well as final disposition. [JK]      ED Course User Index  [JK] Florentin Pete MD                                             Medical Decision Making  This is a 89-year-old female with a history of hypertension presenting with near syncopal episode.  The patient apparently gets these from time to time.  She not actually have any fall or trauma today.  Symptoms seem most consistent with orthostasis.  Patient appears to be back to baseline at this time.  Overall, the patient is nontoxic.  Afebrile.  IV access was established and the patient.  Placed on continuous telemetry monitoring.  Given the patient's presentation, differential is broad and will require further evaluation.  Workup initiated.      Differential diagnosis: Orthostatic hypotension, near  syncope, vasovagal episode, dysrhythmia, acute kidney injury, electrolyte abnormality, anemia      Amount and/or Complexity of Data Reviewed  Independent Historian: caregiver  External Data Reviewed: labs, radiology, ECG and notes.     Details: External laboratories, imaging as well as notes were reviewed personally by myself.  All relevant studies were used to guide decision making.     Date of previous record: 6/12/2024    Source of note: Primary physician    Summary: Patient was seen and evaluated for routine visit and pressure ulcer.  I did review basic laboratory studies on file as well as a previous chest x-ray and EKG        Labs: ordered. Decision-making details documented in ED Course.  Radiology: ordered and independent interpretation performed. Decision-making details documented in ED Course.  ECG/medicine tests: ordered and independent interpretation performed. Decision-making details documented in ED Course.    Risk  Prescription drug management.        Final diagnoses:   Near syncope   Hypokalemia       ED Disposition  ED Disposition       ED Disposition   Discharge    Condition   Stable    Comment   --               Alma Casanova MD  [address] [id number] [phone]    Call in 1 day           Medication List      No changes were made to your prescriptions during this visit.            Florentin Pete MD  06/25/24 2520

## 2024-07-08 ENCOUNTER — HOSPITAL ENCOUNTER (INPATIENT)
Facility: HOSPITAL | Age: 89
LOS: 2 days | Discharge: NURSING, REHAB OR LONG-TERM CARE | End: 2024-07-11
Attending: EMERGENCY MEDICINE | Admitting: HOSPITALIST
Payer: MEDICARE

## 2024-07-08 ENCOUNTER — APPOINTMENT (OUTPATIENT)
Dept: CT IMAGING | Facility: HOSPITAL | Age: 89
End: 2024-07-08
Payer: MEDICARE

## 2024-07-08 DIAGNOSIS — M48.56XD PATHOLOGICAL COMPRESSION FRACTURE OF LUMBAR VERTEBRA WITH ROUTINE HEALING, SUBSEQUENT ENCOUNTER: ICD-10-CM

## 2024-07-08 DIAGNOSIS — M54.9 INTRACTABLE BACK PAIN: ICD-10-CM

## 2024-07-08 DIAGNOSIS — R79.89 ELEVATED LACTIC ACID LEVEL: ICD-10-CM

## 2024-07-08 DIAGNOSIS — Z60.2 ELDERLY PERSON LIVING ALONE: ICD-10-CM

## 2024-07-08 DIAGNOSIS — S22.040A CLOSED WEDGE COMPRESSION FRACTURE OF T4 VERTEBRA, INITIAL ENCOUNTER: Primary | ICD-10-CM

## 2024-07-08 DIAGNOSIS — R41.0 CONFUSION: ICD-10-CM

## 2024-07-08 PROBLEM — M06.9 RHEUMATOID ARTHRITIS INVOLVING MULTIPLE SITES: Status: ACTIVE | Noted: 2024-07-08

## 2024-07-08 PROBLEM — S22.000A COMPRESSION FRACTURE OF BODY OF THORACIC VERTEBRA: Status: ACTIVE | Noted: 2024-07-08

## 2024-07-08 PROBLEM — H35.30 MACULAR DEGENERATION: Status: ACTIVE | Noted: 2024-07-08

## 2024-07-08 PROBLEM — I10 ESSENTIAL HYPERTENSION: Status: ACTIVE | Noted: 2024-07-08

## 2024-07-08 PROBLEM — M35.3 PMR (POLYMYALGIA RHEUMATICA): Status: ACTIVE | Noted: 2024-07-08

## 2024-07-08 LAB
ALBUMIN SERPL-MCNC: 3.8 G/DL (ref 3.5–5.2)
ALBUMIN/GLOB SERPL: 1.2 G/DL
ALP SERPL-CCNC: 97 U/L (ref 39–117)
ALT SERPL W P-5'-P-CCNC: 17 U/L (ref 1–33)
ANION GAP SERPL CALCULATED.3IONS-SCNC: 17 MMOL/L (ref 5–15)
AST SERPL-CCNC: 20 U/L (ref 1–32)
BACTERIA UR QL AUTO: NORMAL /HPF
BASOPHILS # BLD AUTO: 0.03 10*3/MM3 (ref 0–0.2)
BASOPHILS NFR BLD AUTO: 0.2 % (ref 0–1.5)
BILIRUB SERPL-MCNC: 0.5 MG/DL (ref 0–1.2)
BILIRUB UR QL STRIP: NEGATIVE
BUN SERPL-MCNC: 22 MG/DL (ref 8–23)
BUN/CREAT SERPL: 23.9 (ref 7–25)
CALCIUM SPEC-SCNC: 9.4 MG/DL (ref 8.6–10.5)
CHLORIDE SERPL-SCNC: 99 MMOL/L (ref 98–107)
CLARITY UR: CLEAR
CO2 SERPL-SCNC: 21 MMOL/L (ref 22–29)
COLOR UR: YELLOW
CREAT SERPL-MCNC: 0.92 MG/DL (ref 0.57–1)
D-LACTATE SERPL-SCNC: 1.7 MMOL/L (ref 0.5–2)
D-LACTATE SERPL-SCNC: 3.2 MMOL/L (ref 0.5–2)
DEPRECATED RDW RBC AUTO: 51.7 FL (ref 37–54)
EGFRCR SERPLBLD CKD-EPI 2021: 59.6 ML/MIN/1.73
EOSINOPHIL # BLD AUTO: 0.01 10*3/MM3 (ref 0–0.4)
EOSINOPHIL NFR BLD AUTO: 0.1 % (ref 0.3–6.2)
ERYTHROCYTE [DISTWIDTH] IN BLOOD BY AUTOMATED COUNT: 15.1 % (ref 12.3–15.4)
GLOBULIN UR ELPH-MCNC: 3.1 GM/DL
GLUCOSE SERPL-MCNC: 167 MG/DL (ref 65–99)
GLUCOSE UR STRIP-MCNC: NEGATIVE MG/DL
HCT VFR BLD AUTO: 49.7 % (ref 34–46.6)
HGB BLD-MCNC: 16.2 G/DL (ref 12–15.9)
HGB UR QL STRIP.AUTO: NEGATIVE
HOLD SPECIMEN: NORMAL
HYALINE CASTS UR QL AUTO: NORMAL /LPF
IMM GRANULOCYTES # BLD AUTO: 0.14 10*3/MM3 (ref 0–0.05)
IMM GRANULOCYTES NFR BLD AUTO: 1 % (ref 0–0.5)
KETONES UR QL STRIP: ABNORMAL
LEUKOCYTE ESTERASE UR QL STRIP.AUTO: ABNORMAL
LYMPHOCYTES # BLD AUTO: 1.11 10*3/MM3 (ref 0.7–3.1)
LYMPHOCYTES NFR BLD AUTO: 8.1 % (ref 19.6–45.3)
MAGNESIUM SERPL-MCNC: 1.9 MG/DL (ref 1.6–2.4)
MCH RBC QN AUTO: 30.4 PG (ref 26.6–33)
MCHC RBC AUTO-ENTMCNC: 32.6 G/DL (ref 31.5–35.7)
MCV RBC AUTO: 93.2 FL (ref 79–97)
MONOCYTES # BLD AUTO: 1.39 10*3/MM3 (ref 0.1–0.9)
MONOCYTES NFR BLD AUTO: 10.1 % (ref 5–12)
NEUTROPHILS NFR BLD AUTO: 11.02 10*3/MM3 (ref 1.7–7)
NEUTROPHILS NFR BLD AUTO: 80.5 % (ref 42.7–76)
NITRITE UR QL STRIP: NEGATIVE
NRBC BLD AUTO-RTO: 0 /100 WBC (ref 0–0.2)
PH UR STRIP.AUTO: 6 [PH] (ref 5–8)
PLATELET # BLD AUTO: 243 10*3/MM3 (ref 140–450)
PMV BLD AUTO: 9.9 FL (ref 6–12)
POTASSIUM SERPL-SCNC: 3.6 MMOL/L (ref 3.5–5.2)
PROCALCITONIN SERPL-MCNC: 0.11 NG/ML (ref 0–0.25)
PROT SERPL-MCNC: 6.9 G/DL (ref 6–8.5)
PROT UR QL STRIP: NEGATIVE
RBC # BLD AUTO: 5.33 10*6/MM3 (ref 3.77–5.28)
RBC # UR STRIP: NORMAL /HPF
REF LAB TEST METHOD: NORMAL
SODIUM SERPL-SCNC: 137 MMOL/L (ref 136–145)
SP GR UR STRIP: 1.02 (ref 1–1.03)
SQUAMOUS #/AREA URNS HPF: NORMAL /HPF
TSH SERPL DL<=0.05 MIU/L-ACNC: 2.9 UIU/ML (ref 0.27–4.2)
UROBILINOGEN UR QL STRIP: ABNORMAL
WBC # UR STRIP: NORMAL /HPF
WBC NRBC COR # BLD AUTO: 13.7 10*3/MM3 (ref 3.4–10.8)
WHOLE BLOOD HOLD COAG: NORMAL
WHOLE BLOOD HOLD SPECIMEN: NORMAL

## 2024-07-08 PROCEDURE — 80053 COMPREHEN METABOLIC PANEL: CPT | Performed by: EMERGENCY MEDICINE

## 2024-07-08 PROCEDURE — G0378 HOSPITAL OBSERVATION PER HR: HCPCS

## 2024-07-08 PROCEDURE — 81001 URINALYSIS AUTO W/SCOPE: CPT | Performed by: EMERGENCY MEDICINE

## 2024-07-08 PROCEDURE — 83735 ASSAY OF MAGNESIUM: CPT | Performed by: EMERGENCY MEDICINE

## 2024-07-08 PROCEDURE — 99222 1ST HOSP IP/OBS MODERATE 55: CPT | Performed by: NURSE PRACTITIONER

## 2024-07-08 PROCEDURE — 25810000003 SODIUM CHLORIDE 0.9 % SOLUTION: Performed by: NURSE PRACTITIONER

## 2024-07-08 PROCEDURE — 84145 PROCALCITONIN (PCT): CPT | Performed by: EMERGENCY MEDICINE

## 2024-07-08 PROCEDURE — 74176 CT ABD & PELVIS W/O CONTRAST: CPT

## 2024-07-08 PROCEDURE — 85025 COMPLETE CBC W/AUTO DIFF WBC: CPT | Performed by: EMERGENCY MEDICINE

## 2024-07-08 PROCEDURE — 83605 ASSAY OF LACTIC ACID: CPT | Performed by: EMERGENCY MEDICINE

## 2024-07-08 PROCEDURE — 36415 COLL VENOUS BLD VENIPUNCTURE: CPT

## 2024-07-08 PROCEDURE — 25810000003 SODIUM CHLORIDE 0.9 % SOLUTION: Performed by: EMERGENCY MEDICINE

## 2024-07-08 PROCEDURE — 87040 BLOOD CULTURE FOR BACTERIA: CPT | Performed by: EMERGENCY MEDICINE

## 2024-07-08 PROCEDURE — 93005 ELECTROCARDIOGRAM TRACING: CPT

## 2024-07-08 PROCEDURE — 93005 ELECTROCARDIOGRAM TRACING: CPT | Performed by: EMERGENCY MEDICINE

## 2024-07-08 PROCEDURE — 99285 EMERGENCY DEPT VISIT HI MDM: CPT

## 2024-07-08 PROCEDURE — 84443 ASSAY THYROID STIM HORMONE: CPT | Performed by: EMERGENCY MEDICINE

## 2024-07-08 RX ORDER — BISACODYL 10 MG
10 SUPPOSITORY, RECTAL RECTAL DAILY PRN
Status: DISCONTINUED | OUTPATIENT
Start: 2024-07-08 | End: 2024-07-11 | Stop reason: HOSPADM

## 2024-07-08 RX ORDER — POLYETHYLENE GLYCOL 3350 17 G/17G
17 POWDER, FOR SOLUTION ORAL DAILY PRN
Status: DISCONTINUED | OUTPATIENT
Start: 2024-07-08 | End: 2024-07-11 | Stop reason: HOSPADM

## 2024-07-08 RX ORDER — ONDANSETRON 2 MG/ML
4 INJECTION INTRAMUSCULAR; INTRAVENOUS EVERY 6 HOURS PRN
Status: DISCONTINUED | OUTPATIENT
Start: 2024-07-08 | End: 2024-07-11 | Stop reason: HOSPADM

## 2024-07-08 RX ORDER — LATANOPROST 50 UG/ML
1 SOLUTION/ DROPS OPHTHALMIC NIGHTLY
Status: DISCONTINUED | OUTPATIENT
Start: 2024-07-08 | End: 2024-07-11 | Stop reason: HOSPADM

## 2024-07-08 RX ORDER — AMOXICILLIN 250 MG
2 CAPSULE ORAL 2 TIMES DAILY PRN
Status: DISCONTINUED | OUTPATIENT
Start: 2024-07-08 | End: 2024-07-11 | Stop reason: HOSPADM

## 2024-07-08 RX ORDER — ATENOLOL 25 MG/1
25 TABLET ORAL 2 TIMES DAILY
Status: DISCONTINUED | OUTPATIENT
Start: 2024-07-08 | End: 2024-07-11 | Stop reason: HOSPADM

## 2024-07-08 RX ORDER — FUROSEMIDE 20 MG/1
TABLET ORAL DAILY PRN
COMMUNITY
End: 2024-07-11 | Stop reason: HOSPADM

## 2024-07-08 RX ORDER — SODIUM CHLORIDE 0.9 % (FLUSH) 0.9 %
10 SYRINGE (ML) INJECTION EVERY 12 HOURS SCHEDULED
Status: DISCONTINUED | OUTPATIENT
Start: 2024-07-08 | End: 2024-07-11 | Stop reason: HOSPADM

## 2024-07-08 RX ORDER — FOLIC ACID 1 MG/1
1 TABLET ORAL DAILY
Status: DISCONTINUED | OUTPATIENT
Start: 2024-07-09 | End: 2024-07-11 | Stop reason: HOSPADM

## 2024-07-08 RX ORDER — BISACODYL 5 MG/1
5 TABLET, DELAYED RELEASE ORAL DAILY PRN
Status: DISCONTINUED | OUTPATIENT
Start: 2024-07-08 | End: 2024-07-11 | Stop reason: HOSPADM

## 2024-07-08 RX ORDER — HYDROCODONE BITARTRATE AND ACETAMINOPHEN 5; 325 MG/1; MG/1
1 TABLET ORAL EVERY 6 HOURS PRN
Status: DISCONTINUED | OUTPATIENT
Start: 2024-07-08 | End: 2024-07-11 | Stop reason: HOSPADM

## 2024-07-08 RX ORDER — SODIUM CHLORIDE 9 MG/ML
40 INJECTION, SOLUTION INTRAVENOUS AS NEEDED
Status: DISCONTINUED | OUTPATIENT
Start: 2024-07-08 | End: 2024-07-11 | Stop reason: HOSPADM

## 2024-07-08 RX ORDER — SODIUM CHLORIDE 0.9 % (FLUSH) 0.9 %
10 SYRINGE (ML) INJECTION AS NEEDED
Status: DISCONTINUED | OUTPATIENT
Start: 2024-07-08 | End: 2024-07-11 | Stop reason: HOSPADM

## 2024-07-08 RX ORDER — PREDNISONE 5 MG/1
5 TABLET ORAL DAILY
Status: DISCONTINUED | OUTPATIENT
Start: 2024-07-09 | End: 2024-07-11 | Stop reason: HOSPADM

## 2024-07-08 RX ORDER — POTASSIUM CHLORIDE 1.5 G/1.58G
POWDER, FOR SOLUTION ORAL DAILY PRN
COMMUNITY
End: 2024-07-11 | Stop reason: HOSPADM

## 2024-07-08 RX ORDER — SODIUM CHLORIDE 9 MG/ML
75 INJECTION, SOLUTION INTRAVENOUS CONTINUOUS
Status: DISCONTINUED | OUTPATIENT
Start: 2024-07-08 | End: 2024-07-11 | Stop reason: HOSPADM

## 2024-07-08 RX ORDER — GLIMEPIRIDE 2 MG/1
1 TABLET ORAL 2 TIMES DAILY
Status: DISCONTINUED | OUTPATIENT
Start: 2024-07-08 | End: 2024-07-11 | Stop reason: HOSPADM

## 2024-07-08 RX ORDER — ACETAMINOPHEN 500 MG
500 TABLET ORAL EVERY 8 HOURS
COMMUNITY
End: 2024-07-11 | Stop reason: HOSPADM

## 2024-07-08 RX ORDER — ACETAMINOPHEN 325 MG/1
650 TABLET ORAL EVERY 6 HOURS PRN
Status: DISCONTINUED | OUTPATIENT
Start: 2024-07-08 | End: 2024-07-11 | Stop reason: HOSPADM

## 2024-07-08 RX ADMIN — SODIUM CHLORIDE 500 ML: 9 INJECTION, SOLUTION INTRAVENOUS at 19:40

## 2024-07-08 RX ADMIN — TIMOLOL MALEATE 1 DROP: 2.5 SOLUTION/ DROPS OPHTHALMIC at 22:26

## 2024-07-08 RX ADMIN — LATANOPROST 1 DROP: 50 SOLUTION OPHTHALMIC at 22:27

## 2024-07-08 RX ADMIN — SODIUM CHLORIDE 75 ML/HR: 9 INJECTION, SOLUTION INTRAVENOUS at 22:21

## 2024-07-08 RX ADMIN — ACETAMINOPHEN 650 MG: 325 TABLET ORAL at 22:25

## 2024-07-08 RX ADMIN — ATENOLOL 25 MG: 25 TABLET ORAL at 22:25

## 2024-07-08 SDOH — SOCIAL STABILITY - SOCIAL INSECURITY: PROBLEMS RELATED TO LIVING ALONE: Z60.2

## 2024-07-08 NOTE — ED NOTES
Michelle Hoff    Nursing Report ED to Floor:  Mental status: Baseline dementia per family.  Ambulatory status: non ambulatory in ED  Oxygen Therapy:  RA  Cardiac Rhythm: SR/Afib  Admitted from:   Safety Concerns:  weakness  Social Issues: n/a  ED Room #:  22    ED Nurse Phone Extension - [phone] or may call [phone].      HPI:   Chief Complaint   Patient presents with    Flank Pain       Past Medical History:  Past Medical History:   Diagnosis Date    Arthritis     Compression fracture of body of thoracic vertebra     Fracture     T9    Glaucoma     Hypertension     Macular degeneration     Skin tear of left lower leg without complication         Past Surgical History:  Past Surgical History:   Procedure Laterality Date    CATARACT EXTRACTION      COLONOSCOPY  APROX AT AGE 80    KYPHOPLASTY N/A 10/10/2019    Procedure: KYPHOPLASTY  T10, L1;  Surgeon: Fausto Horton MD;  Location: [facility] OR;  Service: Neurosurgery        Admitting Doctor:   Sol Grant MD    Consulting Provider(s):  Consults       No orders found from 6/9/2024 to 7/9/2024.             Admitting Diagnosis:   The primary encounter diagnosis was Closed wedge compression fracture of T4 vertebra, initial encounter. Diagnoses of Intractable back pain, Elderly person living alone, and Elevated lactic acid level were also pertinent to this visit.    Most Recent Vitals:   Vitals:    07/08/24 1827 07/08/24 1830 07/08/24 1900 07/08/24 1930   BP: 174/73 148/66 165/75 (!) 218/90   BP Location: Right arm      Patient Position: Lying      Pulse:  75 68 73   Resp:       Temp:       TempSrc:       SpO2:  96% 95% 98%   Weight:       Height:           Active LDAs/IV Access:   Lines, Drains & Airways       Active LDAs       Name Placement date Placement time Site Days    Peripheral IV 07/08/24 1734 Right Antecubital 07/08/24  1734  Antecubital  less than 1                    Labs (abnormal labs have a star):   Labs Reviewed   COMPREHENSIVE METABOLIC PANEL - Abnormal;  "Notable for the following components:       Result Value    Glucose 167 (*)     CO2 21.0 (*)     Anion Gap 17.0 (*)     eGFR 59.6 (*)     All other components within normal limits    Narrative:     GFR Normal >60  Chronic Kidney Disease <60  Kidney Failure <15    The GFR formula is only valid for adults with stable renal function between ages 18 and 70.   LACTIC ACID, PLASMA - Abnormal; Notable for the following components:    Lactate 3.2 (*)     All other components within normal limits   URINALYSIS W/ CULTURE IF INDICATED - Abnormal; Notable for the following components:    Ketones, UA Trace (*)     Leuk Esterase, UA Trace (*)     All other components within normal limits    Narrative:     In absence of clinical symptoms, the presence of pyuria, bacteria, and/or nitrites on the urinalysis result does not correlate with infection.   CBC WITH AUTO DIFFERENTIAL - Abnormal; Notable for the following components:    WBC 13.70 (*)     RBC 5.33 (*)     Hemoglobin 16.2 (*)     Hematocrit 49.7 (*)     Neutrophil % 80.5 (*)     Lymphocyte % 8.1 (*)     Eosinophil % 0.1 (*)     Immature Grans % 1.0 (*)     Neutrophils, Absolute 11.02 (*)     Monocytes, Absolute 1.39 (*)     Immature Grans, Absolute 0.14 (*)     All other components within normal limits   MAGNESIUM - Normal   PROCALCITONIN - Normal    Narrative:     As a Marker for Sepsis (Non-Neonates):    1. <0.5 ng/mL represents a low risk of severe sepsis and/or septic shock.  2. >2 ng/mL represents a high risk of severe sepsis and/or septic shock.    As a Marker for Lower Respiratory Tract Infections that require antibiotic therapy:    PCT on Admission    Antibiotic Therapy       6-12 Hrs later    >0.5                Strongly Recommended  >0.25 - <0.5        Recommended   0.1 - 0.25          Discouraged              Remeasure/reassess PCT  <0.1                Strongly Discouraged     Remeasure/reassess PCT    As 28 day mortality risk marker: \"Change in Procalcitonin " "Result\" (>80% or <=80%) if Day 0 (or Day 1) and Day 4 values are available. Refer to http://www.[facility]-pct-calculator.com    Change in PCT <=80%  A decrease of PCT levels below or equal to 80% defines a positive change in PCT test result representing a higher risk for 28-day all-cause mortality of patients diagnosed with severe sepsis for septic shock.    Change in PCT >80%  A decrease of PCT levels of more than 80% defines a negative change in PCT result representing a lower risk for 28-day all-cause mortality of patients diagnosed with severe sepsis or septic shock.      TSH - Normal   BLOOD CULTURE   BLOOD CULTURE   RAINBOW DRAW    Narrative:     The following orders were created for panel order [place] Draw.  Procedure                               Abnormality         Status                     ---------                               -----------         ------                     Green Top (Gel)[[id number]]                                  Final result               Lavender Top[[id number]]                                     Final result               Gold Top - SST[[id number]]                                   Final result               Pastor Top[[id number]]                                         Final result               Light Blue Top[[id number]]                                   Final result                 Please view results for these tests on the individual orders.   URINALYSIS, MICROSCOPIC ONLY   LACTIC ACID, REFLEX   CBC AND DIFFERENTIAL    Narrative:     The following orders were created for panel order CBC & Differential.  Procedure                               Abnormality         Status                     ---------                               -----------         ------                     CBC Auto Differential[[id number]]        Abnormal            Final result                 Please view results for these tests on the individual orders.   GREEN TOP   LAVENDER TOP   GOLD TOP - SST   GRAY TOP   LIGHT " BLUE TOP       Meds Given in ED:   Medications   sodium chloride 0.9 % flush 10 mL (has no administration in time range)   sodium chloride 0.9 % bolus 500 mL (500 mL Intravenous New Bag 7/8/24 1940)           Last NIH score:                                                          Dysphagia screening results:        [place] Coma Scale:  No data recorded     CIWA:        Restraint Type:            Isolation Status:  No active isolations

## 2024-07-08 NOTE — ED PROVIDER NOTES
Subjective   History of Present Illness  Patient is a pleasant 89-year-old female who presents to the emergency department today with acute low back pain.  Symptoms been present since midday Saturday.  His back pain is worse with movement and is unfortunately kept the patient in bed.  Patient does have a history of urinary tract infections which have caused her significant low back pain in the past.  She presented to her primary care provider this morning due to these symptoms and was referred to the emergency department due to the level of pain and possible infection.  Patient denies other associated complaints but family notes that the patient has been slightly more confused than usual, consistent with urinary tract infection in the past.        Review of Systems   All other systems reviewed and are negative.      Past Medical History:   Diagnosis Date    Arthritis     Compression fracture of body of thoracic vertebra     Fracture     T9    Glaucoma     Hypertension     Macular degeneration     Skin tear of left lower leg without complication        Allergies   Allergen Reactions    Augmentin [Amoxicillin-Pot Clavulanate] Unknown - High Severity    Cardizem [Diltiazem Hcl] Other (See Comments)     unknown    Metoprolol Other (See Comments)     unknown    Adhesive Tape Rash       Past Surgical History:   Procedure Laterality Date    CATARACT EXTRACTION      COLONOSCOPY  APROX AT AGE 80    KYPHOPLASTY N/A 10/10/2019    Procedure: KYPHOPLASTY  T10, L1;  Surgeon: Fausto Horton MD;  Location: [facility];  Service: Neurosurgery       No family history on file.    Social History     Socioeconomic History    Marital status:    Tobacco Use    Smoking status: Former     Current packs/day: 0.00     Types: Cigarettes     Quit date:      Years since quittin.5    Smokeless tobacco: Never   Substance and Sexual Activity    Alcohol use: Yes     Alcohol/week: 1.0 - 2.0 standard drink of alcohol     Types: 1 -  2 Glasses of wine per week    Drug use: No    Sexual activity: Defer           Objective   Physical Exam  Vitals and nursing note reviewed.   Constitutional:       General: She is not in acute distress.  HENT:      Head: Normocephalic and atraumatic.      Mouth/Throat:      Mouth: Mucous membranes are moist.   Eyes:      Conjunctiva/sclera: Conjunctivae normal.      Pupils: Pupils are equal, round, and reactive to light.   Neck:      Thyroid: No thyromegaly.   Cardiovascular:      Rate and Rhythm: Normal rate and regular rhythm.      Heart sounds: Normal heart sounds. No murmur heard.     No friction rub. No gallop.   Pulmonary:      Effort: Pulmonary effort is normal. No respiratory distress.      Breath sounds: Normal breath sounds.   Abdominal:      General: Bowel sounds are normal.      Palpations: Abdomen is soft.      Tenderness: There is no abdominal tenderness.   Musculoskeletal:         General: Normal range of motion.      Cervical back: Normal range of motion and neck supple.      Comments: I am unable to reproduce the patient's pain with palpation of the abdomen or back.  Patient is in a wheelchair in triage at the time my evaluation.   Lymphadenopathy:      Cervical: No cervical adenopathy.   Skin:     General: Skin is warm and dry.      Capillary Refill: Capillary refill takes less than 2 seconds.   Neurological:      General: No focal deficit present.      Mental Status: She is alert and oriented to person, place, and time.   Psychiatric:         Behavior: Behavior normal.         Thought Content: Thought content normal.         Procedures           ED Course      Recent Results (from the past 24 hour(s))   ECG 12 Lead Tachycardia    Collection Time: 07/08/24  4:44 PM   Result Value Ref Range    QT Interval 356 ms    QTC Interval 426 ms     Note: In addition to lab results from this visit, the labs listed above may include labs taken at another facility or during a different encounter within the last  "24 hours. Please correlate lab times with ED admission and discharge times for further clarification of the services performed during this visit.    MRI Lumbar Spine Without Contrast   Final Result   Mild acute loss of height at the supreme plate of L4. No retropulsion of zone. Moderate canal stenosis at L4-L5.         Electronically Signed: Miguel Tejeda MD     7/9/2024 3:46 PM EDT     Workstation ID: [id number]      CT Abdomen Pelvis Without Contrast   Final Result   Impression:      1. Small hiatal hernia      2. Cholelithiasis      3. Mild sigmoid diverticulosis      4  No evidence of hydronephrosis or obstructing stones      5. Mild acute appearing compression fracture deformity of L4               Electronically Signed: Gee Menchaca MD     7/8/2024 6:01 PM EDT     Workstation ID: [id number]        Vitals:    07/08/24 1632   BP: 139/91   BP Location: Left arm   Patient Position: Sitting   Pulse: (!) 155   Resp: 18   Temp: 97.6 °F (36.4 °C)   TempSrc: Oral   SpO2: 93%   Weight: 61.2 kg (135 lb)   Height: 157.5 cm (62\")     Medications   sodium chloride 0.9 % flush 10 mL (has no administration in time range)     ECG/EMG Results (last 24 hours)       Procedure Component Value Units Date/Time    ECG 12 Lead Tachycardia [[id number]] Collected: 07/08/24 1644     Updated: 07/08/24 1643     QT Interval 356 ms      QTC Interval 426 ms     Narrative:      Test Reason : Tachycardia  Blood Pressure :   */*   mmHG  Vent. Rate :  86 BPM     Atrial Rate :  86 BPM     P-R Int : 250 ms          QRS Dur :  68 ms      QT Int : 356 ms       P-R-T Axes :  39 -14  99 degrees     QTc Int : 426 ms    Sinus rhythm with 1st degree AV block with frequent premature ventricular complexes  Nonspecific ST and T wave abnormality  Abnormal ECG  When compared with ECG of 25-JUN-2024 18:30,  premature ventricular complexes are now present  Criteria for Inferior infarct are no longer present  Nonspecific T wave abnormality now evident in Anterior " leads  Nonspecific T wave abnormality, improved in Lateral leads    Referred By: EDMD           Confirmed By:           ECG 12 Lead Tachycardia   Preliminary Result   Test Reason : Tachycardia   Blood Pressure :   */*   mmHG   Vent. Rate :  86 BPM     Atrial Rate :  86 BPM      P-R Int : 250 ms          QRS Dur :  68 ms       QT Int : 356 ms       P-R-T Axes :  39 -14  99 degrees      QTc Int : 426 ms      Sinus rhythm with 1st degree AV block with frequent premature ventricular    complexes   Nonspecific ST and T wave abnormality   Abnormal ECG   When compared with ECG of 25-JUN-2024 18:30,   premature ventricular complexes are now present   Criteria for Inferior infarct are no longer present   Nonspecific T wave abnormality now evident in Anterior leads   Nonspecific T wave abnormality, improved in Lateral leads      Referred By: EDMD           Confirmed By:       ECG 12 Lead Other; Sepsis    (Results Pending)        Latest Reference Range & Units 07/08/24 17:07   Sodium 136 - 145 mmol/L 137   Potassium 3.5 - 5.2 mmol/L 3.6   Chloride 98 - 107 mmol/L 99   CO2 22.0 - 29.0 mmol/L 21.0 (L)   Anion Gap 5.0 - 15.0 mmol/L 17.0 (H)   BUN 8 - 23 mg/dL 22   Creatinine 0.57 - 1.00 mg/dL 0.92   BUN/Creatinine Ratio 7.0 - 25.0  23.9   eGFR >60.0 mL/min/1.73 59.6 (L)   Glucose 65 - 99 mg/dL 167 (H)   Calcium 8.6 - 10.5 mg/dL 9.4   Magnesium 1.6 - 2.4 mg/dL 1.9   Alkaline Phosphatase 39 - 117 U/L 97   Total Protein 6.0 - 8.5 g/dL 6.9   Albumin 3.5 - 5.2 g/dL 3.8   Globulin gm/dL 3.1   A/G Ratio g/dL 1.2   AST (SGOT) 1 - 32 U/L 20   ALT (SGPT) 1 - 33 U/L 17   Total Bilirubin 0.0 - 1.2 mg/dL 0.5   Lactate 0.5 - 2.0 mmol/L 3.2 (C)   Procalcitonin 0.00 - 0.25 ng/mL 0.11   WBC 3.40 - 10.80 10*3/mm3 13.70 (H)   RBC 3.77 - 5.28 10*6/mm3 5.33 (H)   Hemoglobin 12.0 - 15.9 g/dL 16.2 (H)   Hematocrit 34.0 - 46.6 % 49.7 (H)   Platelets 140 - 450 10*3/mm3 243   RDW 12.3 - 15.4 % 15.1   MCV 79.0 - 97.0 fL 93.2   MCH 26.6 - 33.0 pg 30.4    MCHC 31.5 - 35.7 g/dL 32.6   MPV 6.0 - 12.0 fL 9.9   RDW-SD 37.0 - 54.0 fl 51.7   Neutrophil Rel % 42.7 - 76.0 % 80.5 (H)   Lymphocyte Rel % 19.6 - 45.3 % 8.1 (L)   Monocyte Rel % 5.0 - 12.0 % 10.1   Eosinophil Rel % 0.3 - 6.2 % 0.1 (L)   Basophil Rel % 0.0 - 1.5 % 0.2   Immature Granulocyte Rel % 0.0 - 0.5 % 1.0 (H)   Neutrophils Absolute 1.70 - 7.00 10*3/mm3 11.02 (H)   Lymphocytes Absolute 0.70 - 3.10 10*3/mm3 1.11   Monocytes Absolute 0.10 - 0.90 10*3/mm3 1.39 (H)   Eosinophils Absolute 0.00 - 0.40 10*3/mm3 0.01   Basophils Absolute 0.00 - 0.20 10*3/mm3 0.03   Immature Grans, Absolute 0.00 - 0.05 10*3/mm3 0.14 (H)   nRBC 0.0 - 0.2 /100 WBC 0.0   (C): Data is critically high  (L): Data is abnormally low  (H): Data is abnormally high                                       Medical Decision Making  Patient found to have an acute compression fracture which would explain her low back pain.  This does not explain her slightly increased confusion.  It is possible that confusion could be related to her lack of sleep secondary to pain.  Case discussed with internal medicine attending.  Patient will be admitted for further evaluation and management.    Problems Addressed:  Closed wedge compression fracture of T4 vertebra, initial encounter: complicated acute illness or injury  Elderly person living alone: complicated acute illness or injury  Elevated lactic acid level: complicated acute illness or injury  Intractable back pain: complicated acute illness or injury    Amount and/or Complexity of Data Reviewed  Labs: ordered.  Radiology: ordered.  ECG/medicine tests: ordered.    Risk  Prescription drug management.  Decision regarding hospitalization.        Final diagnoses:   Closed wedge compression fracture of T4 vertebra, initial encounter   Intractable back pain   Elderly person living alone   Elevated lactic acid level       ED Disposition  ED Disposition       ED Disposition   Decision to Admit    Condition   --     Comment   Level of Care: Telemetry [5]   Diagnosis: Compression fracture of body of thoracic vertebra [[id number]]   Admitting Physician: JOSEPH STAFFORD [[id number]]                    Tej Lim DO  07/10/24 0764

## 2024-07-09 ENCOUNTER — APPOINTMENT (OUTPATIENT)
Dept: MRI IMAGING | Facility: HOSPITAL | Age: 89
End: 2024-07-09
Payer: MEDICARE

## 2024-07-09 PROBLEM — S22.040A: Status: ACTIVE | Noted: 2024-07-08

## 2024-07-09 PROBLEM — S32.040A CLOSED COMPRESSION FRACTURE OF L4 LUMBAR VERTEBRA, INITIAL ENCOUNTER: Status: ACTIVE | Noted: 2024-07-09

## 2024-07-09 PROBLEM — M54.9 INTRACTABLE BACK PAIN: Status: ACTIVE | Noted: 2024-07-08

## 2024-07-09 LAB
ANION GAP SERPL CALCULATED.3IONS-SCNC: 12 MMOL/L (ref 5–15)
BASOPHILS # BLD AUTO: 0.05 10*3/MM3 (ref 0–0.2)
BASOPHILS NFR BLD AUTO: 0.5 % (ref 0–1.5)
BUN SERPL-MCNC: 22 MG/DL (ref 8–23)
BUN/CREAT SERPL: 29.3 (ref 7–25)
CALCIUM SPEC-SCNC: 8.7 MG/DL (ref 8.6–10.5)
CHLORIDE SERPL-SCNC: 107 MMOL/L (ref 98–107)
CO2 SERPL-SCNC: 26 MMOL/L (ref 22–29)
CREAT SERPL-MCNC: 0.75 MG/DL (ref 0.57–1)
DEPRECATED RDW RBC AUTO: 52.6 FL (ref 37–54)
EGFRCR SERPLBLD CKD-EPI 2021: 76.2 ML/MIN/1.73
EOSINOPHIL # BLD AUTO: 0.19 10*3/MM3 (ref 0–0.4)
EOSINOPHIL NFR BLD AUTO: 1.8 % (ref 0.3–6.2)
ERYTHROCYTE [DISTWIDTH] IN BLOOD BY AUTOMATED COUNT: 15.4 % (ref 12.3–15.4)
GLUCOSE SERPL-MCNC: 88 MG/DL (ref 65–99)
HCT VFR BLD AUTO: 45.7 % (ref 34–46.6)
HGB BLD-MCNC: 14.7 G/DL (ref 12–15.9)
IMM GRANULOCYTES # BLD AUTO: 0.07 10*3/MM3 (ref 0–0.05)
IMM GRANULOCYTES NFR BLD AUTO: 0.7 % (ref 0–0.5)
INR PPP: 1.09 (ref 0.89–1.12)
LYMPHOCYTES # BLD AUTO: 1.54 10*3/MM3 (ref 0.7–3.1)
LYMPHOCYTES NFR BLD AUTO: 14.5 % (ref 19.6–45.3)
MAGNESIUM SERPL-MCNC: 2 MG/DL (ref 1.6–2.4)
MCH RBC QN AUTO: 30.6 PG (ref 26.6–33)
MCHC RBC AUTO-ENTMCNC: 32.2 G/DL (ref 31.5–35.7)
MCV RBC AUTO: 95 FL (ref 79–97)
MONOCYTES # BLD AUTO: 1.94 10*3/MM3 (ref 0.1–0.9)
MONOCYTES NFR BLD AUTO: 18.3 % (ref 5–12)
NEUTROPHILS NFR BLD AUTO: 6.82 10*3/MM3 (ref 1.7–7)
NEUTROPHILS NFR BLD AUTO: 64.2 % (ref 42.7–76)
NRBC BLD AUTO-RTO: 0 /100 WBC (ref 0–0.2)
PLATELET # BLD AUTO: 225 10*3/MM3 (ref 140–450)
PMV BLD AUTO: 9.8 FL (ref 6–12)
POTASSIUM SERPL-SCNC: 3.5 MMOL/L (ref 3.5–5.2)
PROTHROMBIN TIME: 14.2 SECONDS (ref 12.2–14.5)
RBC # BLD AUTO: 4.81 10*6/MM3 (ref 3.77–5.28)
SODIUM SERPL-SCNC: 145 MMOL/L (ref 136–145)
WBC NRBC COR # BLD AUTO: 10.61 10*3/MM3 (ref 3.4–10.8)

## 2024-07-09 PROCEDURE — 25810000003 SODIUM CHLORIDE 0.9 % SOLUTION: Performed by: NURSE PRACTITIONER

## 2024-07-09 PROCEDURE — 85025 COMPLETE CBC W/AUTO DIFF WBC: CPT | Performed by: NURSE PRACTITIONER

## 2024-07-09 PROCEDURE — 80048 BASIC METABOLIC PNL TOTAL CA: CPT | Performed by: NURSE PRACTITIONER

## 2024-07-09 PROCEDURE — 63710000001 PREDNISONE PER 5 MG: Performed by: NURSE PRACTITIONER

## 2024-07-09 PROCEDURE — 85610 PROTHROMBIN TIME: CPT | Performed by: NURSE PRACTITIONER

## 2024-07-09 PROCEDURE — 72148 MRI LUMBAR SPINE W/O DYE: CPT

## 2024-07-09 PROCEDURE — 83735 ASSAY OF MAGNESIUM: CPT | Performed by: NURSE PRACTITIONER

## 2024-07-09 PROCEDURE — 99232 SBSQ HOSP IP/OBS MODERATE 35: CPT | Performed by: HOSPITALIST

## 2024-07-09 RX ORDER — FAMOTIDINE 20 MG/1
20 TABLET, FILM COATED ORAL
OUTPATIENT
Start: 2024-07-09

## 2024-07-09 RX ADMIN — LATANOPROST 1 DROP: 50 SOLUTION OPHTHALMIC at 21:08

## 2024-07-09 RX ADMIN — PREDNISONE 5 MG: 5 TABLET ORAL at 14:01

## 2024-07-09 RX ADMIN — ATENOLOL 25 MG: 25 TABLET ORAL at 09:21

## 2024-07-09 RX ADMIN — ACETAMINOPHEN 650 MG: 325 TABLET ORAL at 16:11

## 2024-07-09 RX ADMIN — ACETAMINOPHEN 650 MG: 325 TABLET ORAL at 22:07

## 2024-07-09 RX ADMIN — TIMOLOL MALEATE 1 DROP: 2.5 SOLUTION/ DROPS OPHTHALMIC at 09:08

## 2024-07-09 RX ADMIN — TIMOLOL MALEATE 1 DROP: 2.5 SOLUTION/ DROPS OPHTHALMIC at 21:08

## 2024-07-09 RX ADMIN — SODIUM CHLORIDE 75 ML/HR: 9 INJECTION, SOLUTION INTRAVENOUS at 12:40

## 2024-07-09 RX ADMIN — ATENOLOL 25 MG: 25 TABLET ORAL at 21:07

## 2024-07-09 RX ADMIN — FOLIC ACID 1 MG: 1 TABLET ORAL at 14:01

## 2024-07-09 NOTE — H&P
[facility] Medicine Services  HISTORY AND PHYSICAL    Patient Name: Michelle Hoff  : 1935  MRN: [id number]  Primary Care Physician: Alma Casanova MD  Date of admission: 2024    Subjective   Subjective     Chief Complaint:  Lower back pain     HPI:  Michelle Hoff is a 89 y.o. female with PMH significant for RA, PMR, macular degeneration, HTN, presents to the ED with complaint of lower back pain.  She states over the past 4 days she has been having severe lower back pain.  She has been unable to get out of bed due to the severity of the pain.  She denies any known injury or recent fall.  She does note urinary frequency but denies dysuria. She was evaluated by her rheumatologist today who had concern for UTI and recommended evaluation in the ED.  Upon arrival to the ED, labs are concerning for dehydration and elevated lactate.  She is also found to have mild leukocytosis with concentrated H&H.  UA is negative for acute findings.  CT abdomen/pelvis notes acute compression fracture deformity of L4.  She will be admitted to hospital medicine for further evaluation.    Review of Systems   Constitutional:  Positive for activity change, appetite change and fatigue. Negative for chills, diaphoresis, fever and unexpected weight change.   HENT: Negative.  Negative for congestion, sore throat and trouble swallowing.    Eyes: Negative.  Negative for visual disturbance.   Respiratory:  Negative for cough, chest tightness, shortness of breath and wheezing.    Cardiovascular: Negative.  Negative for chest pain, palpitations and leg swelling.   Gastrointestinal: Negative.  Negative for abdominal distention, abdominal pain, constipation, diarrhea, nausea and vomiting.   Endocrine: Negative.  Negative for polydipsia and polyphagia.   Genitourinary:  Positive for frequency. Negative for difficulty urinating, dysuria and urgency.   Musculoskeletal:  Positive for arthralgias, back pain and  gait problem. Negative for joint swelling, myalgias and neck pain.   Skin:  Positive for wound. Negative for color change and pallor.   Allergic/Immunologic: Positive for immunocompromised state.   Neurological:  Positive for weakness. Negative for dizziness, tremors, seizures, syncope, facial asymmetry, speech difficulty, light-headedness, numbness and headaches.   Hematological: Negative.  Does not bruise/bleed easily.   Psychiatric/Behavioral: Negative.  Negative for confusion. The patient is not nervous/anxious.           Personal History     Past Medical History:   Diagnosis Date    Arthritis     Compression fracture of body of thoracic vertebra     Fracture     T9    Glaucoma     Hypertension     Macular degeneration     PMR (polymyalgia rheumatica)     RA (rheumatoid arthritis)     Skin tear of left lower leg without complication      Past Surgical History:   Procedure Laterality Date    CATARACT EXTRACTION      COLONOSCOPY  APROX AT AGE 80    KYPHOPLASTY N/A 10/10/2019    Procedure: KYPHOPLASTY  T10, L1;  Surgeon: Fausto Horton MD;  Location: [facility];  Service: Neurosurgery       Family History:  family history includes Heart disease in her father; Uterine cancer in her mother.     Social History:  reports that she quit smoking about 32 years ago. She has never used smokeless tobacco. She reports current alcohol use of about 1.0 - 2.0 standard drink of alcohol per week. She reports that she does not use drugs.  Social History     Social History Narrative    Not on file       Medications:  Cholecalciferol, Lisinopril, atenolol, calcitonin (salmon), doxycycline, estradiol, folic acid, hydroCHLOROthiazide, latanoprost, lisinopril, medroxyPROGESTERone, meloxicam, methotrexate, mupirocin, predniSONE, timolol, traMADol, and vitamin D3    Allergies   Allergen Reactions    Augmentin [Amoxicillin-Pot Clavulanate] Unknown - High Severity    Cardizem [Diltiazem Hcl] Other (See Comments)     unknown     Metoprolol Other (See Comments)     unknown    Adhesive Tape Rash       Objective   Objective     Vital Signs:   Temp:  [97.6 °F (36.4 °C)] 97.6 °F (36.4 °C)  Heart Rate:  [] 73  Resp:  [18] 18  BP: (139-218)/(66-91) 218/90    Physical Exam   Constitutional: Awake, alert, no acute distress   Eyes: PERRLA, sclerae anicteric, no conjunctival injection  HENT: NCAT, mucous membranes dry  Neck: Supple, no thyromegaly, no lymphadenopathy, trachea midline  Respiratory: Clear to auscultation bilaterally, nonlabored respirations   Cardiovascular: RRR, no murmurs, rubs, or gallops, palpable pedal pulses bilaterally  Gastrointestinal: Positive bowel sounds, soft, nontender, nondistended  Musculoskeletal: mild left ankle edema, trace right ankle edema, no clubbing or cyanosis to extremities  Psychiatric: Appropriate affect, cooperative  Neurologic: Oriented x 3, strength symmetric in all extremities, Cranial Nerves grossly intact to confrontation, speech clear  Skin: No rashes, chronic vascular changes BLE      Result Review:  I have personally reviewed the results from the time of this admission to 7/8/2024 20:51 EDT and agree with these findings:  [x]  Laboratory list / accordion  [x]  Microbiology  [x]  Radiology  [x]  EKG/Telemetry   []  Cardiology/Vascular   []  Pathology  [x]  Old records  []  Other:  Most notable findings include:     LAB RESULTS:      Lab 07/08/24 2022 07/08/24 1707   WBC  --  13.70*   HEMOGLOBIN  --  16.2*   HEMATOCRIT  --  49.7*   PLATELETS  --  243   NEUTROS ABS  --  11.02*   IMMATURE GRANS (ABS)  --  0.14*   LYMPHS ABS  --  1.11   MONOS ABS  --  1.39*   EOS ABS  --  0.01   MCV  --  93.2   PROCALCITONIN  --  0.11   LACTATE 1.7 3.2*         Lab 07/08/24 1734 07/08/24  1707   SODIUM  --  137   POTASSIUM  --  3.6   CHLORIDE  --  99   CO2  --  21.0*   ANION GAP  --  17.0*   BUN  --  22   CREATININE  --  0.92   EGFR  --  59.6*   GLUCOSE  --  167*   CALCIUM  --  9.4   MAGNESIUM  --  1.9   TSH  2.900  --          Lab 07/08/24  1707   TOTAL PROTEIN 6.9   ALBUMIN 3.8   GLOBULIN 3.1   ALT (SGPT) 17   AST (SGOT) 20   BILIRUBIN 0.5   ALK PHOS 97                     Brief Urine Lab Results  (Last result in the past 365 days)        Color   Clarity   Blood   Leuk Est   Nitrite   Protein   CREAT   Urine HCG        07/08/24 1814 Yellow   Clear   Negative   Trace   Negative   Negative                 Microbiology Results (last 10 days)       ** No results found for the last 240 hours. **            CT Abdomen Pelvis Without Contrast    Result Date: 7/8/2024  CT ABDOMEN PELVIS WO CONTRAST Date of Exam: 7/8/2024 5:40 PM EDT Indication: low back pain, concern for UTI, AMS. Comparison: None available. Technique: Axial CT images were obtained of the abdomen and pelvis without the administration of contrast. Reconstructed coronal and sagittal images were also obtained. Automated exposure control and iterative construction methods were used. Findings: Lung Bases:  There is bibasilar reticular lung thickening. Small hiatal hernia Liver:Liver is normal in size and shows homogeneous density. No focal lesions. Biliary/Gallbladder: There is a calcified gallstone measuring 1.6 cm. No pericholecystic fluid Spleen:Spleen is normal in size and CT density. Pancreas: Pancreas shows homogeneous density. There is no evidence of pancreatic mass or peripancreatic fluid. Kidneys: Kidneys are normal in size. There are no hydronephrosis or obstructing stones. There is a tiny punctate right renal calculi versus vascular calcification Adrenals: Adrenal glands are unremarkable. Retroperitoneal/Lymph Nodes/Vasculature: No retroperitoneal adenopathy is identified by size criteria. Gastrointestinal/Mesentery: The bowel loops are non-dilated without definite wall thickening or mass. The appendix appears within normal limits. No evidence of obstruction. No free air. Bladder: The bladder is unremarkable No acute abnormalities in the pelvis Bony  Structures:  Visualized bony structures are consistent with the patient's age. Kyphoplasty changes are seen at T10 and L1. There is mild compression fracture deformity of L4 which appears acute incomplete evaluated in this exam. There is approximately 20 to 30% compression deformity and minimal 3.5 mm posterior cupping. There is grade 1 spondylolisthesis L4 on L5     Impression: Impression: 1. Small hiatal hernia 2. Cholelithiasis 3. Mild sigmoid diverticulosis 4  No evidence of hydronephrosis or obstructing stones 5. Mild acute appearing compression fracture deformity of L4 Electronically Signed: Gee Menchaca MD  7/8/2024 6:01 PM EDT  Workstation ID: [id number]         Assessment & Plan   Assessment & Plan       Pathologic compression fracture of lumbar vertebra    Rheumatoid arthritis involving multiple sites    PMR (polymyalgia rheumatica)    Essential hypertension    Macular degeneration    Elevated lactic acid level     89 y.o. female with PMH significant for RA, PMR, macular degeneration, HTN, presents to the ED with complaint of lower back pain who is found to have concern for L4 compression fracture    Acute L4 compression fracture w intractable back pain  - Fall precautions  - As needed p.o. pain control  - Neurosurgery consult in the a.m., previously seen per Dr. Horton  - N.p.o. after midnight  - CBC, BMP, PT/INR in the a.m.    Elevated lactate  Leukocytosis  - Appears mildly dehydrated  - IV fluid bolus given in the ED, continue gentle IV fluid overnight  - Reflex lactic improved  - Chronic steroid use  - CBC, BMP in the a.m.    Chronic left arm wound  - Wound consult in the a.m.  - Follows outpatient with wound therapy    Atrial fibrillation  - Currently rate controlled  - Continue atenolol  - Not on anticoagulation    RA  PMR  Immunocompromised  - Follows outpatient with rheumatology  - On methotrexate  - Continue daily prednisone  - Continue folic acid    Hypertension  - Continue  atenolol    Glaucoma  - Continue Xalatan      DVT prophylaxis: SCDs    CODE STATUS: Discussed with patient   Level Of Support Discussed With: Patient  Code Status (Patient has no pulse and is not breathing): CPR (Attempt to Resuscitate)  Medical Interventions (Patient has pulse or is breathing): Full Support      Expected Discharge  Expected Discharge Date: 7/10/2024; Expected Discharge Time:     Signature: Electronically signed by JAMMIE Leyva, 07/08/24, 8:51 PM EDT.

## 2024-07-09 NOTE — PLAN OF CARE
Problem: Adult Inpatient Plan of Care  Goal: Plan of Care Review  Outcome: Ongoing, Progressing  Flowsheets (Taken 7/9/2024 0446)  Plan of Care Reviewed With: patient  Goal: Patient-Specific Goal (Individualized)  Outcome: Ongoing, Progressing  Goal: Absence of Hospital-Acquired Illness or Injury  Outcome: Ongoing, Progressing  Intervention: Identify and Manage Fall Risk  Recent Flowsheet Documentation  Taken 7/9/2024 0200 by Vero Flannery RN  Safety Promotion/Fall Prevention: safety round/check completed  Taken 7/8/2024 2100 by Vero Flannery RN  Safety Promotion/Fall Prevention:   activity supervised   assistive device/personal items within reach   clutter free environment maintained   fall prevention program maintained   room organization consistent  Intervention: Prevent Skin Injury  Recent Flowsheet Documentation  Taken 7/9/2024 0400 by Vero Flannery RN  Body Position: supine  Skin Protection:   adhesive use limited   incontinence pads utilized   tubing/devices free from skin contact  Taken 7/9/2024 0200 by Vero Flannery RN  Body Position: right  Skin Protection:   adhesive use limited   incontinence pads utilized   tubing/devices free from skin contact  Taken 7/9/2024 0000 by Vero Flannery RN  Skin Protection:   adhesive use limited   incontinence pads utilized   tubing/devices free from skin contact  Taken 7/8/2024 2100 by Vero Flannery RN  Body Position: supine  Skin Protection:   adhesive use limited   incontinence pads utilized   tubing/devices free from skin contact   silicone foam dressing in place  Intervention: Prevent and Manage VTE (Venous Thromboembolism) Risk  Recent Flowsheet Documentation  Taken 7/8/2024 2100 by Vero Flannery RN  Activity Management: bedrest  VTE Prevention/Management:   bilateral   sequential compression devices off   patient refused intervention  Range of Motion: (fracture) other (see comments)  Intervention: Prevent Infection  Recent Flowsheet  Documentation  Taken 7/9/2024 0400 by Vero Flannery RN  Infection Prevention: rest/sleep promoted  Taken 7/9/2024 0200 by Vero Flannery RN  Infection Prevention: rest/sleep promoted  Taken 7/9/2024 0000 by Vero Flannery RN  Infection Prevention:   environmental surveillance performed   hand hygiene promoted   rest/sleep promoted   single patient room provided  Taken 7/8/2024 2100 by Vero Flannery RN  Infection Prevention:   environmental surveillance performed   hand hygiene promoted   single patient room provided  Goal: Optimal Comfort and Wellbeing  Outcome: Ongoing, Progressing  Intervention: Provide Person-Centered Care  Recent Flowsheet Documentation  Taken 7/8/2024 2100 by Vero Flannery RN  Trust Relationship/Rapport: care explained  Goal: Readiness for Transition of Care  Outcome: Ongoing, Progressing     Problem: Pain Chronic (Persistent) (Comorbidity Management)  Goal: Acceptable Pain Control and Functional Ability  Outcome: Ongoing, Progressing  Intervention: Optimize Psychosocial Wellbeing  Recent Flowsheet Documentation  Taken 7/8/2024 2100 by Vero Flannery RN  Supportive Measures: active listening utilized  Diversional Activities: television  Family/Support System Care: self-care encouraged     Problem: Skin Injury Risk Increased  Goal: Skin Health and Integrity  Outcome: Ongoing, Progressing  Intervention: Optimize Skin Protection  Recent Flowsheet Documentation  Taken 7/9/2024 0400 by Vero Flannery RN  Pressure Reduction Techniques: frequent weight shift encouraged  Head of Bed (HOB) Positioning: HOB at 15 degrees  Pressure Reduction Devices: foam padding utilized  Skin Protection:   adhesive use limited   incontinence pads utilized   tubing/devices free from skin contact  Taken 7/9/2024 0200 by Vero Flannery RN  Pressure Reduction Techniques: frequent weight shift encouraged  Pressure Reduction Devices: foam padding utilized  Skin Protection:   adhesive use limited    incontinence pads utilized   tubing/devices free from skin contact  Taken 7/9/2024 0000 by Vero Flannery RN  Skin Protection:   adhesive use limited   incontinence pads utilized   tubing/devices free from skin contact  Taken 7/8/2024 2100 by Vero Flannery RN  Head of Bed (HOB) Positioning: HOB flat  Skin Protection:   adhesive use limited   incontinence pads utilized   tubing/devices free from skin contact   silicone foam dressing in place   Goal Outcome Evaluation:  Plan of Care Reviewed With: patient

## 2024-07-09 NOTE — PLAN OF CARE
Goal Outcome Evaluation:  Plan of Care Reviewed With: patient, daughter      Patient alert and oriented x 4. Pain tolerable when laying still. Refused to turn and reposition due to pain with movement, educated patient on the importance of turning to prevent skin breakdown, patient stated understanding but declined to turn. Waffle mattress to bed. Prn tylenol given for pain. Anticipate kyphoplasty tomorrow, npo after mn, consent signed and on chart.

## 2024-07-09 NOTE — NURSING NOTE
Reason for Wound, Ostomy and Continence (WOC) Nursing Consultation:  Wound    Indicate Wound Location / Details left arm, skin     Patient seen in bed.  Family/support person tele floor.     Wounds noted by WOC:left arm skin tear, chronic has seen  wound center for a while.    Wound Assessment  Wound Type: skin tear  Location: left arm  Wound Bed: moist and pink  Wound Edges: Irregular  Periwound Skin: fragile skin   Drainage Characteristics/Odor: none  Drainage Amount: none  Pain: No   Care provided: vashe soak, multilayer xeroform, kerlix roll, stocking.  Notes: daily dressing changes by nursing, does not look infected with no drainage, do not think abx  ointment necessary while in hospital, can continue at home.    Recommendation(s) for management of wound:   -Refer to wound care orders for specific instructions on how to treat/manage wound.  -Practice pressure injury prevention protocol.    Most recent Quang Scale score:  Sensory Perception: 3-->slightly limited  Moisture: 4-->rarely moist  Activity: 3-->walks occasionally  Mobility: 2-->very limited  Nutrition: 3-->adequate  Friction and Shear: 2-->potential problem  Quang Score: 17 (07/09/24 0930)     Pressure Injury Prevention Protocol (initiate for Quang Score of 18 or less):   *Turn q 2 hr, keep heels elevated and offloaded with offloading heel boots.  *Allevn dressings to heels, sacrum/coccyx  *Follow C.A.R.E protocol if medical devices (Bipap, cervantes, Ng tube, etc) are being used.  *Reduce layers under patient (one sheet as drawsheet and two incontinence pads) to allow waffle or MELVA to improve microclimate   *Raise knee-gatch before elevating HOB to reduce shearing      WOC Team will sign off.  Please re-consult if the wound(s) worsens.

## 2024-07-09 NOTE — CONSULTS
Reason for consultation: L4 compression fracture    Referring Physician:  Tej Lim DO     Chief complaint low back pain     Admit Diagnosis:   Compression fracture of body of thoracic vertebra [S22.000A]  Closed compression fracture of L4 lumbar vertebra, initial encounter [S32.040A]     History of present illness:  This is an 89-year-old female that presented to the [facility] ED with severe onset of low back pain for the past few days.  She reports that she has not been able to get out of bed due to how significant her pain is.  She does not recall any inciting event or fall.  She was seen by her rheumatologist yesterday who recommend further workup at the ED and CT of the abdomen/pelvis noted a possible acute compression fracture at L4.  Ultimately, neurosurgery was consulted.  Her daughter was at the bedside during today's visit.  Patient reports that her pain is a 10 out of 10 while she is trying to move but it is tolerable while she was laying flat in bed.  She also states that she does have a history of prior kyphoplasties with Dr. Horton.  She is not on any blood thinners.    ROS:  A 12 point review of systems was performed.  All systems reviewed were negative with the exception of those mentioned in the history of present illness.    Past medical history:  Past Medical History:   Diagnosis Date    Arthritis     Compression fracture of body of thoracic vertebra     Fracture     T9    Glaucoma     Hypertension     Macular degeneration     PMR (polymyalgia rheumatica)     RA (rheumatoid arthritis)     Skin tear of left lower leg without complication        Past surgical history:  Past Surgical History:   Procedure Laterality Date    CATARACT EXTRACTION      COLONOSCOPY  APROX AT AGE 80    KYPHOPLASTY N/A 10/10/2019    Procedure: KYPHOPLASTY  T10, L1;  Surgeon: Fausto Horton MD;  Location: [facility];  Service: Neurosurgery       Social history:  Social History     Socioeconomic History     Marital status:    Tobacco Use    Smoking status: Former     Current packs/day: 0.00     Types: Cigarettes     Quit date:      Years since quittin.5    Smokeless tobacco: Never   Vaping Use    Vaping status: Never Used   Substance and Sexual Activity    Alcohol use: Yes     Alcohol/week: 1.0 - 2.0 standard drink of alcohol     Types: 1 - 2 Glasses of wine per week    Drug use: No    Sexual activity: Defer       Family history:  Family History   Problem Relation Age of Onset    Uterine cancer Mother     Heart disease Father        Allergies:  Allergies   Allergen Reactions    Augmentin [Amoxicillin-Pot Clavulanate] Unknown - High Severity    Cardizem [Diltiazem Hcl] Other (See Comments)     unknown    Metoprolol Other (See Comments)     unknown    Adhesive Tape Rash       Outpatient medications:  Scheduled Meds:atenolol, 25 mg, Oral, BID  folic acid, 1 mg, Oral, Daily  latanoprost, 1 drop, Both Eyes, Nightly  predniSONE, 5 mg, Oral, Daily  sodium chloride, 10 mL, Intravenous, Q12H  timolol, 1 drop, Both Eyes, BID      Continuous Infusions:sodium chloride, 75 mL/hr, Last Rate: 75 mL/hr (24 0159)      PRN Meds:.  acetaminophen    senna-docusate sodium **AND** polyethylene glycol **AND** bisacodyl **AND** bisacodyl    HYDROcodone-acetaminophen    ondansetron    sodium chloride    sodium chloride    sodium chloride    Physical Examination:  General:  Vitals:    24 0921   BP: 150/90   Pulse: 60   Resp:    Temp:    SpO2:      Systolic (24hrs), Av , Min:139 , Max:218     Diastolic (24hrs), Av, Min:66, Max:91    Pulse  Av.8  Min: 52  Max: 155    Temp (24hrs), Av.7 °F (36.5 °C), Min:97.4 °F (36.3 °C), Max:98 °F (36.7 °C)    General: Chronically ill-appearing elderly, frail female.  She is lying flat in bed and appears to be in no acute distress.  Her daughter is at the bedside.  Neurological:   Sensation is intact to light touch throughout.  Musculoskeletal: She is able to move  all 4 extremities but does so weakly.  Muscle atrophy throughout.  Vascular: 2+ radial pulses bilaterally.  Cardiovascular: Regular rate and rhythm.  Extremities: No clubbing, cyanosis, or edema. Muscle atrophy throughout.   Pulmonary: Normal effort and excursion.  No evidence of respiratory distress.  Psychiatric: Normal mood and affect  HEENT: Normocephalic, atraumatic.  Eyes: No scleral icterus.  Extraocular eye movements are intact, and pupils are equal, round, and reactive to light.    Imaging:  She has for my review a CT scan of the abdomen and pelvis without contrast that reveals a mild compression deformity at L4.  There are kyphoplasty changes seen at T10 and L1.      Assessment and Plan:    L4 compression fracture  History of T10 and L1 compression fracture - s/p kyphoplasties with Dr. Machuca in 2019     This is an 89-year-old female who has a history of previous T10 and L1 kyphoplasties with Dr. Horton in 2019.  I have spoken to the patient and her daughter at the bedside today and they wish to move forward with possible neurosurgical intervention.  I have ordered an MRI of the lumbar spine without contrast to further evaluate the age of her compression fracture.  When she has her MRI we can discuss further neurosurgical intervention.  Appreciate the consult.

## 2024-07-09 NOTE — PROGRESS NOTES
[facility] Medicine Services  PROGRESS NOTE    Patient Name: Michelle Hoff  : 1935  MRN: [id number]    Date of Admission: 2024  Primary Care Physician: Alma Casanova MD    Subjective   Subjective     CC: Back pain    HPI: No pain in bed, only when attempts to move. At baseline uses walker, and has helpers/caregivers during day. Denies dyspnea. No n/v. No dysuria.     Objective   Objective     Vital Signs:   Temp:  [97.4 °F (36.3 °C)-98 °F (36.7 °C)] 98 °F (36.7 °C)  Heart Rate:  [] 59  Resp:  [17-18] 18  BP: (139-218)/(66-91) 196/81  Flow (L/min):  [2] 2     Physical Exam:  NAD, alert and oriented  OP clear, dry MM  Neck supple  No LAD  RRR  CTAB  +BS, soft  HANNAH  Normal affect  LUE wrapped    Results Reviewed:  LAB RESULTS:      Lab 24  170   WBC  --   --  13.70*   HEMOGLOBIN  --   --  16.2*   HEMATOCRIT  --   --  49.7*   PLATELETS  --   --  243   NEUTROS ABS  --   --  11.02*   IMMATURE GRANS (ABS)  --   --  0.14*   LYMPHS ABS  --   --  1.11   MONOS ABS  --   --  1.39*   EOS ABS  --   --  0.01   MCV  --   --  93.2   PROCALCITONIN  --   --  0.11   LACTATE  --  1.7 3.2*   PROTIME 14.2  --   --          Lab 24  1707   SODIUM  --  137   POTASSIUM  --  3.6   CHLORIDE  --  99   CO2  --  21.0*   ANION GAP  --  17.0*   BUN  --  22   CREATININE  --  0.92   EGFR  --  59.6*   GLUCOSE  --  167*   CALCIUM  --  9.4   MAGNESIUM  --  1.9   TSH 2.900  --          Lab 24  1707   TOTAL PROTEIN 6.9   ALBUMIN 3.8   GLOBULIN 3.1   ALT (SGPT) 17   AST (SGOT) 20   BILIRUBIN 0.5   ALK PHOS 97         Lab 24  0529   PROTIME 14.2   INR 1.09                 Brief Urine Lab Results  (Last result in the past 365 days)        Color   Clarity   Blood   Leuk Est   Nitrite   Protein   CREAT   Urine HCG        24 1814 Yellow   Clear   Negative   Trace   Negative   Negative                   Microbiology Results Abnormal        None            CT Abdomen Pelvis Without Contrast    Result Date: 7/8/2024  CT ABDOMEN PELVIS WO CONTRAST Date of Exam: 7/8/2024 5:40 PM EDT Indication: low back pain, concern for UTI, AMS. Comparison: None available. Technique: Axial CT images were obtained of the abdomen and pelvis without the administration of contrast. Reconstructed coronal and sagittal images were also obtained. Automated exposure control and iterative construction methods were used. Findings: Lung Bases:  There is bibasilar reticular lung thickening. Small hiatal hernia Liver:Liver is normal in size and shows homogeneous density. No focal lesions. Biliary/Gallbladder: There is a calcified gallstone measuring 1.6 cm. No pericholecystic fluid Spleen:Spleen is normal in size and CT density. Pancreas: Pancreas shows homogeneous density. There is no evidence of pancreatic mass or peripancreatic fluid. Kidneys: Kidneys are normal in size. There are no hydronephrosis or obstructing stones. There is a tiny punctate right renal calculi versus vascular calcification Adrenals: Adrenal glands are unremarkable. Retroperitoneal/Lymph Nodes/Vasculature: No retroperitoneal adenopathy is identified by size criteria. Gastrointestinal/Mesentery: The bowel loops are non-dilated without definite wall thickening or mass. The appendix appears within normal limits. No evidence of obstruction. No free air. Bladder: The bladder is unremarkable No acute abnormalities in the pelvis Bony Structures:  Visualized bony structures are consistent with the patient's age. Kyphoplasty changes are seen at T10 and L1. There is mild compression fracture deformity of L4 which appears acute incomplete evaluated in this exam. There is approximately 20 to 30% compression deformity and minimal 3.5 mm posterior cupping. There is grade 1 spondylolisthesis L4 on L5     Impression: Impression: 1. Small hiatal hernia 2. Cholelithiasis 3. Mild sigmoid diverticulosis 4  No evidence of  hydronephrosis or obstructing stones 5. Mild acute appearing compression fracture deformity of L4 Electronically Signed: Gee Menchaca MD  7/8/2024 6:01 PM EDT  Workstation ID: [id number]         Current medications:  Scheduled Meds:atenolol, 25 mg, Oral, BID  folic acid, 1 mg, Oral, Daily  latanoprost, 1 drop, Both Eyes, Nightly  predniSONE, 5 mg, Oral, Daily  sodium chloride, 10 mL, Intravenous, Q12H  timolol, 1 drop, Both Eyes, BID      Continuous Infusions:sodium chloride, 75 mL/hr, Last Rate: 75 mL/hr (07/09/24 0159)      PRN Meds:.  acetaminophen    senna-docusate sodium **AND** polyethylene glycol **AND** bisacodyl **AND** bisacodyl    HYDROcodone-acetaminophen    ondansetron    sodium chloride    sodium chloride    sodium chloride    Assessment & Plan   Assessment & Plan     Active Hospital Problems    Diagnosis  POA    **Pathologic compression fracture of lumbar vertebra [M48.56XA]  Yes    Rheumatoid arthritis involving multiple sites [M06.9]  Yes    PMR (polymyalgia rheumatica) [M35.3]  Yes    Essential hypertension [I10]  Yes    Macular degeneration [H35.30]  Yes    Elevated lactic acid level [R79.89]  Yes      Resolved Hospital Problems   No resolved problems to display.        Brief Hospital Course to date:  Michelle Hoff is a 89 y.o. female with history of RA, PMR, macular degeneration, HTN, here with back pain and found to have L4 compression fracture    L4 compression fracture  -PT/OT  -NSG consulted    Elevated lactate  Leukocytosis  -unremarkable UA  -volume depletion, IVF    Chronic L arm owund  -wound consult    Afib  -on atenolol, not on AC    RA  PMR  -immuncompromised  -on MTX/steroids  -on folate    HTN  -on atenolol    Debility  -PT/OT, PLOF at home with walker, with daily caregivers    Expected Discharge Location and Transportation: Rehab  Expected Discharge   Expected Discharge Date: 7/10/2024; Expected Discharge Time:      VTE Prophylaxis:  Mechanical VTE prophylaxis orders are  present.         AM-PAC 6 Clicks Score (PT): 6 (07/08/24 2100)    CODE STATUS:   Code Status and Medical Interventions:   Ordered at: 07/08/24 2051     Level Of Support Discussed With:    Patient     Code Status (Patient has no pulse and is not breathing):    CPR (Attempt to Resuscitate)     Medical Interventions (Patient has pulse or is breathing):    Full Support       Chris Gutierrez MD  07/09/24

## 2024-07-09 NOTE — PROGRESS NOTES
"          Clinical Nutrition Assessment     Patient Name: Michelle Hoff  YOB: 1935  MRN: [id number]  Date of Encounter: 07/09/24 18:47 EDT  Admission date: 7/8/2024  Reason for Visit: Identified at risk by screening criteria    Assessment   Nutrition Assessment   Admission Diagnosis:  Compression fracture of body of thoracic vertebra [S22.000A]  Closed compression fracture of L4 lumbar vertebra, initial encounter [S32.040A]    Problem List:    Pathologic compression fracture of lumbar vertebra    Rheumatoid arthritis involving multiple sites    PMR (polymyalgia rheumatica)    Essential hypertension    Macular degeneration    Elevated lactic acid level    Closed compression fracture of L4 lumbar vertebra, initial encounter    Closed wedge compression fracture of fourth thoracic vertebra    Intractable back pain      PMH:   She  has a past medical history of Arthritis, Compression fracture of body of thoracic vertebra, Fracture, Glaucoma, Hypertension, Macular degeneration, PMR (polymyalgia rheumatica), RA (rheumatoid arthritis), and Skin tear of left lower leg without complication.    PSH:  She  has a past surgical history that includes Cataract extraction; Colonoscopy (APROX AT AGE 80); and Kyphoplasty (N/A, 10/10/2019).    Applicable Nutrition History:     L. arm skin tear- C following    Anthropometrics     Height: Height: 157.5 cm (62.01\")  Last Filed Weight: Weight: 62.4 kg (137 lb 8 oz) (07/09/24 0600)  Method: Weight Method: Bed scale  BMI: BMI (Calculated): 25.1    UBW:  135-137lb  Weight change: unchanged     Weight       Weight (kg) Weight (lbs) Weight Method Visit Report   9/14/2019 58.968 kg  130 lb      9/16/2019 61.236 kg  135 lb   --    10/7/2019 61.236 kg  135 lb   --    10/9/2019 60.782 kg  134 lb  Standing scale     11/4/2019 59.875 kg  132 lb   --    7/31/2021 61.236 kg  135 lb  Stated     5/3/2022 58.968 kg  130 lb  Stated     8/5/2022 58.968 kg  130 lb  Stated     8/16/2022 56.7 " kg  125 lb      2/14/2024 58.968 kg  130 lb  Estimated;Stated     6/25/2024 61.236 kg  135 lb  Stated     7/8/2024 61.236 kg  135 lb  Stated      62.143 kg  137 lb  Bed scale     7/9/2024 62.37 kg  137 lb 8 oz          Nutrition Focused Physical Exam    Date:     Unable to perform due to Pt unable to participate at time of visit     Subjective   Reported/Observed/Food/Nutrition Related History:     Pt sleeping at present    Family report that pt has had poor appetite today, did not eat much lunch, has not had dinner yet, pt has partial dentures, is able to chew ok, -137lb's, wt has been stable at home    Plan for surgery tomorrow    Current Nutrition Prescription   PO: Diet: Cardiac; Healthy Heart (2-3 Na+); Fluid Consistency: Thin (IDDSI 0)  Oral Nutrition Supplement:   Intake: Insufficient data    Assessment & Plan   Nutrition Diagnosis   Date:  7-9-24            Updated:    Problem Increased nutrient needs    Etiology Poor Skin Integrity   Signs/Symptoms L arm skin tear       Goal / Objectives:   Nutrition to support treatment and Establish PO      Nutrition Intervention      Follow treatment progress, Care plan reviewed, Advised available snacks, Interview for preferences, Encourage intake, Supplement provided    Add Boost+ 2x/day    Monitoring/Evaluation:   Per protocol, I&O, PO intake, Pertinent labs, Weight, Skin status, GI status, Symptoms, Swallow function    Abbi Albrecht RD  Time Spent:30min

## 2024-07-10 LAB
QT INTERVAL: 322 MS
QT INTERVAL: 356 MS
QTC INTERVAL: 426 MS
QTC INTERVAL: 429 MS

## 2024-07-10 PROCEDURE — 97162 PT EVAL MOD COMPLEX 30 MIN: CPT

## 2024-07-10 PROCEDURE — 99231 SBSQ HOSP IP/OBS SF/LOW 25: CPT | Performed by: NEUROLOGICAL SURGERY

## 2024-07-10 PROCEDURE — 97166 OT EVAL MOD COMPLEX 45 MIN: CPT

## 2024-07-10 PROCEDURE — 99232 SBSQ HOSP IP/OBS MODERATE 35: CPT | Performed by: HOSPITALIST

## 2024-07-10 PROCEDURE — 97535 SELF CARE MNGMENT TRAINING: CPT

## 2024-07-10 PROCEDURE — 63710000001 PREDNISONE PER 5 MG: Performed by: NURSE PRACTITIONER

## 2024-07-10 PROCEDURE — 97116 GAIT TRAINING THERAPY: CPT

## 2024-07-10 RX ORDER — SODIUM CHLORIDE 0.9 % (FLUSH) 0.9 %
10 SYRINGE (ML) INJECTION AS NEEDED
OUTPATIENT
Start: 2024-07-10

## 2024-07-10 RX ORDER — MIDAZOLAM HYDROCHLORIDE 1 MG/ML
0.5 INJECTION INTRAMUSCULAR; INTRAVENOUS
OUTPATIENT
Start: 2024-07-10

## 2024-07-10 RX ORDER — SODIUM CHLORIDE 9 MG/ML
40 INJECTION, SOLUTION INTRAVENOUS AS NEEDED
OUTPATIENT
Start: 2024-07-10

## 2024-07-10 RX ORDER — FAMOTIDINE 20 MG/1
20 TABLET, FILM COATED ORAL ONCE
OUTPATIENT
Start: 2024-07-10 | End: 2024-07-10

## 2024-07-10 RX ORDER — FAMOTIDINE 10 MG/ML
20 INJECTION, SOLUTION INTRAVENOUS ONCE
OUTPATIENT
Start: 2024-07-10 | End: 2024-07-10

## 2024-07-10 RX ORDER — LIDOCAINE HYDROCHLORIDE 10 MG/ML
0.5 INJECTION, SOLUTION EPIDURAL; INFILTRATION; INTRACAUDAL; PERINEURAL ONCE AS NEEDED
OUTPATIENT
Start: 2024-07-10

## 2024-07-10 RX ORDER — SODIUM CHLORIDE, SODIUM LACTATE, POTASSIUM CHLORIDE, CALCIUM CHLORIDE 600; 310; 30; 20 MG/100ML; MG/100ML; MG/100ML; MG/100ML
9 INJECTION, SOLUTION INTRAVENOUS CONTINUOUS
OUTPATIENT
Start: 2024-07-10

## 2024-07-10 RX ORDER — SODIUM CHLORIDE 0.9 % (FLUSH) 0.9 %
10 SYRINGE (ML) INJECTION EVERY 12 HOURS SCHEDULED
OUTPATIENT
Start: 2024-07-10

## 2024-07-10 RX ADMIN — LATANOPROST 1 DROP: 50 SOLUTION OPHTHALMIC at 20:07

## 2024-07-10 RX ADMIN — TIMOLOL MALEATE 1 DROP: 2.5 SOLUTION/ DROPS OPHTHALMIC at 10:03

## 2024-07-10 RX ADMIN — PREDNISONE 5 MG: 5 TABLET ORAL at 11:17

## 2024-07-10 RX ADMIN — ATENOLOL 25 MG: 25 TABLET ORAL at 20:06

## 2024-07-10 RX ADMIN — FOLIC ACID 1 MG: 1 TABLET ORAL at 10:03

## 2024-07-10 RX ADMIN — Medication 10 ML: at 11:18

## 2024-07-10 RX ADMIN — TIMOLOL MALEATE 1 DROP: 2.5 SOLUTION/ DROPS OPHTHALMIC at 20:07

## 2024-07-10 RX ADMIN — ACETAMINOPHEN 650 MG: 325 TABLET ORAL at 20:13

## 2024-07-10 RX ADMIN — ACETAMINOPHEN 650 MG: 325 TABLET ORAL at 11:16

## 2024-07-10 RX ADMIN — ATENOLOL 25 MG: 25 TABLET ORAL at 10:03

## 2024-07-10 RX ADMIN — Medication 10 ML: at 20:07

## 2024-07-10 NOTE — THERAPY EVALUATION
Patient Name: Michelle Hoff  : 1935    MRN: [id number]                              Today's Date: 7/10/2024       Admit Date: 2024    Visit Dx:     ICD-10-CM ICD-9-CM   1. Closed wedge compression fracture of T4 vertebra, initial encounter  S22.040A 805.2   2. Intractable back pain  M54.9 724.5   3. Elderly person living alone  Z60.2 V60.3   4. Elevated lactic acid level  R79.89 276.2   5. Pathological compression fracture of lumbar vertebra with routine healing, subsequent encounter  M48.56XD V54.27   6. Confusion  R41.0 298.9     Patient Active Problem List   Diagnosis    Pathologic compression fracture of lumbar vertebra    Rheumatoid arthritis involving multiple sites    PMR (polymyalgia rheumatica)    Essential hypertension    Macular degeneration    Elevated lactic acid level    Closed compression fracture of L4 lumbar vertebra, initial encounter    Closed wedge compression fracture of fourth thoracic vertebra    Intractable back pain     Past Medical History:   Diagnosis Date    Arthritis     Compression fracture of body of thoracic vertebra     Fracture     T9    Glaucoma     Hypertension     Macular degeneration     PMR (polymyalgia rheumatica)     RA (rheumatoid arthritis)     Skin tear of left lower leg without complication      Past Surgical History:   Procedure Laterality Date    CATARACT EXTRACTION      COLONOSCOPY  APROX AT AGE 80    KYPHOPLASTY N/A 10/10/2019    Procedure: KYPHOPLASTY  T10, L1;  Surgeon: Fausto Horton MD;  [facility]  Service: Neurosurgery      General Information       Row Name 07/10/24 1403          OT Time and Intention    Document Type evaluation;therapy note (daily note)  -TB     Mode of Treatment occupational therapy  -TB       Row Name 07/10/24 1403          General Information    Patient Profile Reviewed yes  -TB     Prior Level of Function independent:;all household mobility;transfer;min assist:;ADL's;mod assist:;home  management;dependent:;driving  -TB     Existing Precautions/Restrictions fall;spinal;other (see comments)  spinal precautions for comfort  -TB     Barriers to Rehab previous functional deficit  -TB       Row Name 07/10/24 1403          Occupational Profile    Reason for Services/Referral (Occupational Profile) Occupational decline  -TB     Environmental Supports and Barriers (Occupational Profile) Pt lives alone in a single story home with 2-3 steps to enter. Walk-in tub. Comfort ht commodes. Rollator at baseline. Endorses 3 falls in the past 6 months. Has caregivers 7-3 & 3-9 daily.  -TB       Row Name 07/10/24 1403          Living Environment    People in Home alone;other (see comments)  Caregivers daily  -TB       Row Name 07/10/24 1403          Home Main Entrance    Number of Stairs, Main Entrance three  -TB     Stair Railings, Main Entrance railings safe and in good condition  -TB       Row Name 07/10/24 1403          Stairs Within Home, Primary    Number of Stairs, Within Home, Primary none  -TB       Row Name 07/10/24 1403          Cognition    Orientation Status (Cognition) oriented x 3  -TB       Row Name 07/10/24 1403          Safety Issues, Functional Mobility    Safety Issues Affecting Function (Mobility) insight into deficits/self-awareness;awareness of need for assistance;safety precaution awareness;safety precautions follow-through/compliance;sequencing abilities;positioning of assistive device  -TB     Impairments Affecting Function (Mobility) balance;endurance/activity tolerance;pain;strength  -TB     Comment, Safety Issues/Impairments (Mobility) Pt is up in room and transfering with RW support and Min Ax2. Good effort. Reports pain improving.  -TB               User Key  (r) = Recorded By, (t) = Taken By, (c) = Cosigned By      Initials Name Provider Type    TB Vero Roa OT Occupational Therapist                     Mobility/ADL's       Row Name 07/10/24 1411          Bed Mobility     Bed Mobility sidelying-sit;sit-sidelying;scooting/bridging  -TB     Scooting/Bridging [place] (Bed Mobility) maximum assist (25% patient effort);2 person assist;verbal cues  -TB     Sidelying-Sit [place] (Bed Mobility) moderate assist (50% patient effort);2 person assist;verbal cues  -TB     Sit-Sidelying [place] (Bed Mobility) moderate assist (50% patient effort);2 person assist;verbal cues  -TB     Bed Mobility, Safety Issues decreased use of arms for pushing/pulling;decreased use of legs for bridging/pushing;impaired trunk control for bed mobility  -TB     Assistive Device (Bed Mobility) draw sheet;bed rails  -TB     Comment, (Bed Mobility) Education initiated for spinal precautions and logroll sequencing. Good effort.  -TB       Row Name 07/10/24 1411          Transfers    Transfers sit-stand transfer;stand-sit transfer;toilet transfer;bed-chair transfer  -TB     Comment, (Transfers) Education, cues, and assist for hand placement, sequencing, and safety all transfers. Requires assist for RW mgmt.  -TB       Row Name 07/10/24 1411          Bed-Chair Transfer    Bed-Chair [place] (Transfers) minimum assist (75% patient effort);2 person assist;verbal cues  -TB     Assistive Device (Bed-Chair Transfers) walker, front-wheeled  -TB       Row Name 07/10/24 1411          Sit-Stand Transfer    Sit-Stand [place] (Transfers) minimum assist (75% patient effort);2 person assist;verbal cues  -TB     Assistive Device (Sit-Stand Transfers) walker, front-wheeled  -TB       Row Name 07/10/24 1411          Stand-Sit Transfer    Stand-Sit [place] (Transfers) minimum assist (75% patient effort);2 person assist;verbal cues  -TB     Assistive Device (Stand-Sit Transfers) walker, front-wheeled  -TB       Row Name 07/10/24 1411          Toilet Transfer    Type (Toilet Transfer) sit-stand;stand-sit  -TB     [place] Level (Toilet Transfer) minimum assist (75% patient effort);2 person assist;verbal cues   -TB     Assistive Device (Toilet Transfer) commode, bedside without drop arms;walker, front-wheeled  -TB       Row Name 07/10/24 1411          Functional Mobility    Functional Mobility- Ind. Level minimum assist (75% patient effort);2 person assist required;verbal cues required  -TB     Functional Mobility- Device walker, front-wheeled  -TB     Functional Mobility- Safety Issues sequencing ability decreased;balance decreased during turns  -TB     Functional Mobility- Comment Pt is up in room and transfering with RW support and Min Ax2. Good effort. Reports pain improving.  -TB     Patient was able to Ambulate yes  -TB       Row Name 07/10/24 1411          Activities of Daily Living    BADL Assessment/Intervention lower body dressing;toileting  -TB       Row Name 07/10/24 1411          Lower Body Dressing Assessment/Training    [place] Level (Lower Body Dressing) don;socks;dependent (less than 25% patient effort)  -TB     Position (Lower Body Dressing) edge of bed sitting  -TB     Comment, (Lower Body Dressing) Pt presents with significant skin integrity concerns BLE. AE contraindicated d/t risk of wounds.   -TB       Row Name 07/10/24 1411          Toileting Assessment/Training    [place] Level (Toileting) dependent (less than 25% patient effort);adjust/manage clothing;perform perineal hygiene  -TB     Position (Toileting) unsupported sitting;supported standing  -TB               User Key  (r) = Recorded By, (t) = Taken By, (c) = Cosigned By      Initials Name Provider Type    TB Vero Roa, OT Occupational Therapist                   Obj/Interventions       Row Name 07/10/24 1414          Sensory Assessment (Somatosensory)    Sensory Assessment (Somatosensory) UE sensation intact  -TB       Row Name 07/10/24 1414          Vision Assessment/Intervention    Visual Impairment/Limitations other (see comments)  -TB     Vision Assessment Comment Macular Degeneration  -TB       Row Name 07/10/24  1414          Range of Motion Comprehensive    General Range of Motion bilateral upper extremity ROM WFL  -TB     Comment, General Range of Motion BUE AROM WFL for self-care  -TB       Row Name 07/10/24 1414          Strength Comprehensive (MMT)    Comment, General Manual Muscle Testing (MMT) Assessment Generalized weakness. BUE 4-/5  -TB       Row Name 07/10/24 1414          Balance    Balance Assessment sitting dynamic balance;sit to stand dynamic balance;standing dynamic balance  -TB     Dynamic Sitting Balance supervision  -TB     Position, Sitting Balance unsupported;sitting in chair;sitting edge of bed;other (see comments)  BSC  -TB     Sit to Stand Dynamic Balance minimal assist;2-person assist;verbal cues  -TB     Dynamic Standing Balance minimal assist;2-person assist;verbal cues  -TB     Position/Device Used, Standing Balance supported;walker, front-wheeled  -TB     Balance Interventions sitting;standing;sit to stand;supported;static;dynamic;occupation based/functional task;UE activity with balance activity  -TB     Comment, Balance Unsteady without LOB  -TB               User Key  (r) = Recorded By, (t) = Taken By, (c) = Cosigned By      Initials Name Provider Type    TB Vero Roa OT Occupational Therapist                   Goals/Plan       Row Name 07/10/24 1421          Transfer Goal 1 (OT)    Activity/Assistive Device (Transfer Goal 1, OT) toilet;sit-to-stand/stand-to-sit  -TB     [place] Level/Cues Needed (Transfer Goal 1, OT) minimum assist (75% or more patient effort);verbal cues required  -TB     Time Frame (Transfer Goal 1, OT) long term goal (LTG);5 days  -TB     Progress/Outcome (Transfer Goal 1, OT) goal ongoing  -TB       Row Name 07/10/24 1421          Toileting Goal 1 (OT)    Activity/Device (Toileting Goal 1, OT) perform perineal hygiene  -TB     [place] Level/Cues Needed (Toileting Goal 1, OT) minimum assist (75% or more patient effort);verbal cues required  -TB      Time Frame (Toileting Goal 1, OT) long term goal (LTG);5 days  -TB     Progress/Outcome (Toileting Goal 1, OT) goal ongoing  -TB       Row Name 07/10/24 1421          Grooming Goal 1 (OT)    Activity/Device (Grooming Goal 1, OT) oral care  -TB     [place] (Grooming Goal 1, OT) set-up required  -TB     Time Frame (Grooming Goal 1, OT) short term goal (STG);3 days  -TB     Progress/Outcome (Grooming Goal 1, OT) goal ongoing  -TB               User Key  (r) = Recorded By, (t) = Taken By, (c) = Cosigned By      Initials Name Provider Type    TB Vero Roa, OT Occupational Therapist                   Clinical Impression       Row Name 07/10/24 1416          Pain Assessment    Pain Intervention(s) Ambulation/increased activity;Repositioned  -TB     Additional Documentation Pain Scale: FACES Pre/Post-Treatment (Group)  -TB       Row Name 07/10/24 1416          Pain Scale: FACES Pre/Post-Treatment    Pain: FACES Scale, Pretreatment 2-->hurts little bit  -TB     Posttreatment Pain Rating 4-->hurts little more  -TB     Pain Location lower  -TB     Pain Location - back  -TB     Pre/Posttreatment Pain Comment Pt tolerates EOB/OOB activity  -TB       Row Name 07/10/24 1416          Plan of Care Review    Plan of Care Reviewed With patient;daughter  -TB     Progress improving  -TB     Outcome Evaluation OT IE completed. Pt is A/Ox3 and participates in therapy with good effort. Presents below her baseline with strength, balance, and acitivity tolerance deficits. Education initiated for spinal precautions and logroll sequencing. Mod Ax2 to enter and exit bed and maintain precautions. Dependent wtih LB ADLs and toileting at this time. AE contraindicated d/t decreased skin integrity BLE and risk for wounds. Pt is up in room and transfering with RW support and Min Ax2. Good effort. Reports pain improving. Unable to get comfortable in recliner. Pt returned to supine at end of session. OT will follow IP. Recommend IRF  when pt is medically ready.  -TB       Row Name 07/10/24 1416          Therapy Assessment/Plan (OT)    Rehab Potential (OT) good, to achieve stated therapy goals  -TB     Criteria for Skilled Therapeutic Interventions Met (OT) yes;meets criteria;skilled treatment is necessary  -TB     Therapy Frequency (OT) daily  -TB       Row Name 07/10/24 1416          Therapy Plan Review/Discharge Plan (OT)    Anticipated Discharge Disposition (OT) inpatient rehabilitation facility  -TB       Row Name 07/10/24 1416          Vital Signs    Pre Systolic BP Rehab --  RN cleared OT  -TB     Pre SpO2 (%) 94  -TB     O2 Delivery Pre Treatment nasal cannula  -TB     Intra SpO2 (%) 81  -TB     O2 Delivery Intra Treatment room air  -TB     Post SpO2 (%) 91  -TB     O2 Delivery Post Treatment nasal cannula  -TB     Pre Patient Position Supine  -TB     Intra Patient Position Standing  -TB     Post Patient Position Supine  -TB       Row Name 07/10/24 1416          Positioning and Restraints    Pre-Treatment Position in bed  -TB     Post Treatment Position bed  -TB     In Bed notified nsg;supine;call light within reach;encouraged to call for assist;exit alarm on  -TB               User Key  (r) = Recorded By, (t) = Taken By, (c) = Cosigned By      Initials Name Provider Type    TB Vero Roa, OT Occupational Therapist                   Outcome Measures       Row Name 07/10/24 1422          How much help from another is currently needed...    Putting on and taking off regular lower body clothing? 1  -TB     Bathing (including washing, rinsing, and drying) 2  -TB     Toileting (which includes using toilet bed pan or urinal) 1  -TB     Putting on and taking off regular upper body clothing 2  -TB     Taking care of personal grooming (such as brushing teeth) 3  -TB     Eating meals 3  -TB     AM-PAC 6 Clicks Score (OT) 12  -TB       Row Name 07/10/24 0915          How much help from another person do you currently need...    Turning  from your back to your side while in flat bed without using bedrails? 3  -MW     Moving from lying on back to sitting on the side of a flat bed without bedrails? 2  -MW     Moving to and from a bed to a chair (including a wheelchair)? 2  -MW     Standing up from a chair using your arms (e.g., wheelchair, bedside chair)? 2  -MW     Climbing 3-5 steps with a railing? 2  -MW     To walk in hospital room? 2  -MW     AM-PAC 6 Clicks Score (PT) 13  -MW     Highest Level of Mobility Goal 4 --> Transfer to chair/commode  -MW       Row Name 07/10/24 1422          Functional Assessment    Outcome Measure Options AM-PAC 6 Clicks Daily Activity (OT)  -TB               User Key  (r) = Recorded By, (t) = Taken By, (c) = Cosigned By      Initials Name Provider Type    TB Vero Roa OT Occupational Therapist    Cynthia Corrigan RN Registered Nurse                    Occupational Therapy Education       Title: PT OT SLP Therapies (In Progress)       Topic: Occupational Therapy (In Progress)       Point: ADL training (Done)       Description:   Instruct learner(s) on proper safety adaptation and remediation techniques during self care or transfers.   Instruct in proper use of assistive devices.                  Learning Progress Summary             Patient Acceptance, E,D, VU,NR by TB at 7/10/2024 1423                         Point: Home exercise program (Not Started)       Description:   Instruct learner(s) on appropriate technique for monitoring, assisting and/or progressing therapeutic exercises/activities.                  Learner Progress:  Not documented in this visit.              Point: Precautions (Done)       Description:   Instruct learner(s) on prescribed precautions during self-care and functional transfers.                  Learning Progress Summary             Patient Acceptance, E,D, VU,NR by TB at 7/10/2024 1423                         Point: Body mechanics (Not Started)       Description:   Instruct  learner(s) on proper positioning and spine alignment during self-care, functional mobility activities and/or exercises.                  Learner Progress:  Not documented in this visit.                              User Key       Initials Effective Dates Name Provider Type Discipline     07/11/23 -  Vero Roa OT Occupational Therapist OT                  OT Recommendation and Plan  Therapy Frequency (OT): daily  Plan of Care Review  Plan of Care Reviewed With: patient, daughter  Progress: improving  Outcome Evaluation: OT IE completed. Pt is A/Ox3 and participates in therapy with good effort. Presents below her baseline with strength, balance, and acitivity tolerance deficits. Education initiated for spinal precautions and logroll sequencing. Mod Ax2 to enter and exit bed and maintain precautions. Dependent wtih LB ADLs and toileting at this time. AE contraindicated d/t decreased skin integrity BLE and risk for wounds. Pt is up in room and transfering with RW support and Min Ax2. Good effort. Reports pain improving. Unable to get comfortable in recliner. Pt returned to supine at end of session. OT will follow IP. Recommend IRF when pt is medically ready.     Time Calculation:         Time Calculation- OT       Row Name 07/10/24 1314             Time Calculation- OT    OT Start Time 1314  -TB      OT Received On 07/10/24  -TB      OT Goal Re-Cert Due Date 07/20/24  -TB         Timed Charges    60709 - OT Self Care/Mgmt Minutes 15  -TB         Untimed Charges    OT Eval/Re-eval Minutes 55  -TB         Total Minutes    Timed Charges Total Minutes 15  -TB      Untimed Charges Total Minutes 55  -TB       Total Minutes 70  -TB                User Key  (r) = Recorded By, (t) = Taken By, (c) = Cosigned By      Initials Name Provider Type    TB Vero Roa OT Occupational Therapist                  Therapy Charges for Today       Code Description Service Date Service Provider Modifiers Qty     26265539629 HC OT SELF CARE/MGMT/TRAIN EA 15 MIN 7/10/2024 Vero Roa, OT GO 1    25441737409 HC OT EVAL MOD COMPLEXITY 4 7/10/2024 Vero Roa OT GO 1                 Vero Roa OT  7/10/2024

## 2024-07-10 NOTE — THERAPY EVALUATION
Patient Name: Michelle Hoff  : 1935    MRN: [id number]                              Today's Date: 7/10/2024       Admit Date: 2024    Visit Dx:     ICD-10-CM ICD-9-CM   1. Closed wedge compression fracture of T4 vertebra, initial encounter  S22.040A 805.2   2. Intractable back pain  M54.9 724.5   3. Elderly person living alone  Z60.2 V60.3   4. Elevated lactic acid level  R79.89 276.2   5. Pathological compression fracture of lumbar vertebra with routine healing, subsequent encounter  M48.56XD V54.27   6. Confusion  R41.0 298.9     Patient Active Problem List   Diagnosis    Pathologic compression fracture of lumbar vertebra    Rheumatoid arthritis involving multiple sites    PMR (polymyalgia rheumatica)    Essential hypertension    Macular degeneration    Elevated lactic acid level    Closed compression fracture of L4 lumbar vertebra, initial encounter    Closed wedge compression fracture of fourth thoracic vertebra    Intractable back pain     Past Medical History:   Diagnosis Date    Arthritis     Compression fracture of body of thoracic vertebra     Fracture     T9    Glaucoma     Hypertension     Macular degeneration     PMR (polymyalgia rheumatica)     RA (rheumatoid arthritis)     Skin tear of left lower leg without complication      Past Surgical History:   Procedure Laterality Date    CATARACT EXTRACTION      COLONOSCOPY  APROX AT AGE 80    KYPHOPLASTY N/A 10/10/2019    Procedure: KYPHOPLASTY  T10, L1;  Surgeon: Fausto Horton MD;  Location: [facility];  Service: Neurosurgery      General Information       Row Name 07/10/24 1440          Physical Therapy Time and Intention    Document Type evaluation  -AB     Mode of Treatment physical therapy  -AB       Row Name 07/10/24 1440          General Information    Patient Profile Reviewed yes  -AB     Prior Level of Function independent:;all household mobility;gait;transfer;bed mobility;min assist:;ADL's;home management;dependent:;driving  Pt  endorses 3 falls in last 6 months. Uses rollator for mobility. Caregivers provide assist from 9AM-7PM.  -AB     Existing Precautions/Restrictions fall;spinal;other (see comments)  spinal precautions for comfort  -AB     Barriers to Rehab previous functional deficit  -AB       Row Name 07/10/24 1440          Living Environment    People in Home alone  caregivers daily  -AB       Row Name 07/10/24 1440          Home Main Entrance    Number of Stairs, Main Entrance three  -AB     Stair Railings, Main Entrance railing on right side (ascending)  -AB       Row Name 07/10/24 1440          Stairs Within Home, Primary    Number of Stairs, Within Home, Primary none  -AB       Row Name 07/10/24 1440          Cognition    Orientation Status (Cognition) oriented x 3  -AB       Row Name 07/10/24 1440          Safety Issues, Functional Mobility    Safety Issues Affecting Function (Mobility) awareness of need for assistance;safety precaution awareness;safety precautions follow-through/compliance;sequencing abilities;insight into deficits/self-awareness;positioning of assistive device  -AB     Impairments Affecting Function (Mobility) balance;endurance/activity tolerance;pain;strength;postural/trunk control  -AB               User Key  (r) = Recorded By, (t) = Taken By, (c) = Cosigned By      Initials Name Provider Type    AB Kristy Fierro PT Physical Therapist                   Mobility       Row Name 07/10/24 1443          Bed Mobility    Bed Mobility sidelying-sit;sit-sidelying;scooting/bridging  -AB     Scooting/Bridging [place] (Bed Mobility) maximum assist (25% patient effort);2 person assist;verbal cues  -AB     Sidelying-Sit [place] (Bed Mobility) moderate assist (50% patient effort);2 person assist;verbal cues  -AB     Sit-Sidelying [place] (Bed Mobility) moderate assist (50% patient effort);2 person assist;verbal cues  -AB     Assistive Device (Bed Mobility) draw sheet;bed rails  -AB     Comment, (Bed  Mobility) Educated on logroll for bed mobility. Assist provided at BLE and trunk to transition from sidelying to sit.  -AB       Row Name 07/10/24 1443          Transfers    Comment, (Transfers) Cues for hand placement, sequencing, and safety awareness. Spinal precautions reviewed.  -AB       Row Name 07/10/24 1443          Bed-Chair Transfer    Bed-Chair [employer] (Transfers) minimum assist (75% patient effort);2 person assist;verbal cues  -AB     Assistive Device (Bed-Chair Transfers) walker, front-wheeled  -AB     Comment, (Bed-Chair Transfer) chair>BSC  -AB       Row Name 07/10/24 1443          Sit-Stand Transfer    Sit-Stand [employer] (Transfers) minimum assist (75% patient effort);2 person assist;verbal cues  -AB     Assistive Device (Sit-Stand Transfers) walker, front-wheeled  -AB     Comment, (Sit-Stand Transfer) Cues for upright posture and pushing up from chair. STSx3  -AB       Row Name 07/10/24 1443          Gait/Stairs (Locomotion)    [employer] Level (Gait) minimum assist (75% patient effort);2 person assist;verbal cues  -AB     Assistive Device (Gait) walker, front-wheeled  -AB     Patient was able to Ambulate yes  -AB     Distance in Feet (Gait) 30  -AB     Deviations/Abnormal Patterns (Gait) bilateral deviations;malgorzata decreased;base of support, narrow;gait speed decreased;stride length decreased  -AB     Bilateral Gait Deviations forward flexed posture;heel strike decreased  -AB     [employer] Level (Stairs) unable to assess  -AB     Comment, (Gait/Stairs) Pt ambulated with step to gait pattern, slowed malgorzata, and decreased step length. Cues for upright posture and keeping walker closer to body. Gait mechanics worsened with fatigue. No overt LOB. Min A for improved stability and keeping walker closer to body. Further activity limited by pain and fatigue.  -AB               User Key  (r) = Recorded By, (t) = Taken By, (c) = Cosigned By      Initials Name Provider Type    Kristy Parker  N, PT Physical Therapist                   Obj/Interventions       Row Name 07/10/24 1449          Range of Motion Comprehensive    General Range of Motion bilateral lower extremity ROM WFL  -AB       Row Name 07/10/24 1449          Strength Comprehensive (MMT)    General Manual Muscle Testing (MMT) Assessment lower extremity strength deficits identified  -AB     Comment, General Manual Muscle Testing (MMT) Assessment BLE grossly 4-/5 as observed during functional mobility.  -AB       Row Name 07/10/24 1449          Balance    Balance Assessment sitting static balance;sitting dynamic balance;standing static balance;standing dynamic balance  -AB     Static Sitting Balance standby assist  -AB     Dynamic Sitting Balance supervision  -AB     Position, Sitting Balance unsupported;sitting edge of bed  -AB     Static Standing Balance minimal assist  -AB     Dynamic Standing Balance minimal assist;2-person assist;verbal cues  -AB     Position/Device Used, Standing Balance supported;walker, front-wheeled  -AB     Balance Interventions sitting;standing;sit to stand;supported;static;dynamic;occupation based/functional task  -AB     Comment, Balance No overt LOB. Min A to steady  -AB       Row Name 07/10/24 1449          Sensory Assessment (Somatosensory)    Sensory Assessment (Somatosensory) LE sensation intact  -AB               User Key  (r) = Recorded By, (t) = Taken By, (c) = Cosigned By      Initials Name Provider Type    AB Kristy Fierro N, PT Physical Therapist                   Goals/Plan       Row Name 07/10/24 1453          Bed Mobility Goal 1 (PT)    Activity/Assistive Device (Bed Mobility Goal 1, PT) sit to supine;supine to sit  -AB     [place] Level/Cues Needed (Bed Mobility Goal 1, PT) contact guard required  -AB     Time Frame (Bed Mobility Goal 1, PT) short term goal (STG);5 days  -AB       Row Name 07/10/24 1453          Transfer Goal 1 (PT)    Activity/Assistive Device (Transfer Goal 1, PT)  sit-to-stand/stand-to-sit;bed-to-chair/chair-to-bed;walker, rolling  -AB     [place] Level/Cues Needed (Transfer Goal 1, PT) contact guard required  -AB     Time Frame (Transfer Goal 1, PT) long term goal (LTG);10 days  -AB       Row Name 07/10/24 1453          Gait Training Goal 1 (PT)    Activity/Assistive Device (Gait Training Goal 1, PT) gait (walking locomotion);assistive device use;walker, rolling  -AB     [place] Level (Gait Training Goal 1, PT) contact guard required  -AB     Distance (Gait Training Goal 1, PT) 150  -AB     Time Frame (Gait Training Goal 1, PT) long term goal (LTG);10 days  -AB       Row Name 07/10/24 6873          Therapy Assessment/Plan (PT)    Planned Therapy Interventions (PT) balance training;bed mobility training;gait training;home exercise program;patient/family education;postural re-education;transfer training;stretching;strengthening;ROM (range of motion)  -AB               User Key  (r) = Recorded By, (t) = Taken By, (c) = Cosigned By      Initials Name Provider Type    AB Kristy Fierro, PT Physical Therapist                   Clinical Impression       Row Name 07/10/24 1047          Pain Scale: FACES Pre/Post-Treatment    Pain: FACES Scale, Pretreatment 2-->hurts little bit  -AB     Posttreatment Pain Rating 4-->hurts little more  -AB     Pain Location lower  -AB     Pain Location - back  -AB     Pre/Posttreatment Pain Comment tolerated.  -AB       Row Name 07/10/24 1226          Plan of Care Review    Plan of Care Reviewed With patient  -AB     Progress no change  -AB     Outcome Evaluation PT initial eval completed. Pt presents below her functional baseline with weakness, increased pain, impaired balance, and decreased activity tolerance. Education provided on spinal precautions, pt verbalized understanding. Pt ambulated 30' with min Ax2 and RW. Further IPPT is warrented. PT rec d/c to IPR for best functional outcomes.  -AB       Row Name 07/10/24 7836           Therapy Assessment/Plan (PT)    Patient/Family Therapy Goals Statement (PT) Get better  -AB     Rehab Potential (PT) good, to achieve stated therapy goals  -AB     Criteria for Skilled Interventions Met (PT) yes;meets criteria;skilled treatment is necessary  -AB     Therapy Frequency (PT) daily  -AB     Predicted Duration of Therapy Intervention (PT) 10 days  -AB       Row Name 07/10/24 1450          Vital Signs    Pre Systolic BP Rehab 196  -AB     Pre Treatment Diastolic BP 81  -AB     Pretreatment Heart Rate (beats/min) 64  -AB     Posttreatment Heart Rate (beats/min) 70  -AB     Pre SpO2 (%) 94  -AB     O2 Delivery Pre Treatment nasal cannula  -AB     Intra SpO2 (%) 81   -AB     O2 Delivery Intra Treatment room air  -AB     Post SpO2 (%) 93  -AB     O2 Delivery Post Treatment nasal cannula  -AB     Pre Patient Position Supine  -AB     Intra Patient Position Standing  -AB     Post Patient Position Supine  -AB       Row Name 07/10/24 1450          Positioning and Restraints    Pre-Treatment Position in bed  -AB     Post Treatment Position bed  -AB     In Bed notified nsg;supine;encouraged to call for assist;exit alarm on;call light within reach;legs elevated  -AB               User Key  (r) = Recorded By, (t) = Taken By, (c) = Cosigned By      Initials Name Provider Type    AB Kristy Fierro, PT Physical Therapist                   Outcome Measures       Row Name 07/10/24 1454 07/10/24 0915       How much help from another person do you currently need...    Turning from your back to your side while in flat bed without using bedrails? 3  -AB 3  -MW    Moving from lying on back to sitting on the side of a flat bed without bedrails? 2  -AB 2  -MW    Moving to and from a bed to a chair (including a wheelchair)? 3  -AB 2  -MW    Standing up from a chair using your arms (e.g., wheelchair, bedside chair)? 3  -AB 2  -MW    Climbing 3-5 steps with a railing? 2  -AB 2  -MW    To walk in hospital room? 3  -AB 2  -MW     AM-PAC 6 Clicks Score (PT) 16  -AB 13  -MW    Highest Level of Mobility Goal 5 --> Static standing  -AB 4 --> Transfer to chair/commode  -      Row Name 07/10/24 1454 07/10/24 1422       Functional Assessment    Outcome Measure Options AM-PAC 6 Clicks Basic Mobility (PT)  -AB AM-PAC 6 Clicks Daily Activity (OT)  -TB              User Key  (r) = Recorded By, (t) = Taken By, (c) = Cosigned By      Initials Name Provider Type    TB Vero Roa, OT Occupational Therapist    Cynthia Corrigan, RN Registered Nurse    Kristy Parker, PT Physical Therapist                                 Physical Therapy Education       Title: PT OT SLP Therapies (In Progress)       Topic: Physical Therapy (In Progress)       Point: Mobility training (Done)       Learning Progress Summary             Patient Acceptance, E,D, VU,NR by AB at 7/10/2024 1454                         Point: Home exercise program (Not Started)       Learner Progress:  Not documented in this visit.              Point: Body mechanics (Done)       Learning Progress Summary             Patient Acceptance, E,D, VU,NR by AB at 7/10/2024 1454                         Point: Precautions (Done)       Learning Progress Summary             Patient Acceptance, E,D, VU,NR by AB at 7/10/2024 1454                                         User Key       Initials Effective Dates Name Provider Type Discipline    AB 09/22/22 -  Kristy Fierro, PT Physical Therapist PT                  PT Recommendation and Plan  Planned Therapy Interventions (PT): balance training, bed mobility training, gait training, home exercise program, patient/family education, postural re-education, transfer training, stretching, strengthening, ROM (range of motion)  Plan of Care Reviewed With: patient  Progress: no change  Outcome Evaluation: PT initial eval completed. Pt presents below her functional baseline with weakness, increased pain, impaired balance, and decreased activity tolerance.  Education provided on spinal precautions, pt verbalized understanding. Pt ambulated 30' with min Ax2 and RW. Further IPPT is warrented. PT rec d/c to IPR for best functional outcomes.     Time Calculation:   PT Evaluation Complexity  History, PT Evaluation Complexity: 1-2 personal factors and/or comorbidities  Examination of Body Systems (PT Eval Complexity): total of 3 or more elements  Clinical Presentation (PT Evaluation Complexity): evolving  Clinical Decision Making (PT Evaluation Complexity): moderate complexity  Overall Complexity (PT Evaluation Complexity): moderate complexity     PT Charges       Row Name 07/10/24 1455             Time Calculation    Start Time 1315  -AB      PT Received On 07/10/24  -AB      PT Goal Re-Cert Due Date 07/20/24  -AB         Timed Charges    29725 - Gait Training Minutes  8  -AB         Untimed Charges    PT Eval/Re-eval Minutes 52  -AB         Total Minutes    Timed Charges Total Minutes 8  -AB      Untimed Charges Total Minutes 52  -AB       Total Minutes 60  -AB                User Key  (r) = Recorded By, (t) = Taken By, (c) = Cosigned By      Initials Name Provider Type    AB Kristy Fierro, PT Physical Therapist                  Therapy Charges for Today       Code Description Service Date Service Provider Modifiers Qty    17944166164 HC GAIT TRAINING EA 15 MIN 7/10/2024 Kristy Fierro, PT GP 1    75282272989 HC PT EVAL MOD COMPLEXITY 4 7/10/2024 Kristy Fierro, PT GP 1            PT G-Codes  Outcome Measure Options: AM-PAC 6 Clicks Basic Mobility (PT)  AM-PAC 6 Clicks Score (PT): 16  AM-PAC 6 Clicks Score (OT): 12  PT Discharge Summary  Anticipated Discharge Disposition (PT): inpatient rehabilitation facility    Kristy Fierro PT  7/10/2024

## 2024-07-10 NOTE — PROGRESS NOTES
[facility] Medicine Services  PROGRESS NOTE    Patient Name: Michelle Hoff  : 1935  MRN: [id number]    Date of Admission: 2024  Primary Care Physician: Alma Casanova MD    Subjective   Subjective     CC: Back pain    HPI: Notes back pain with movement. Otherwise no new issues.     Objective   Objective     Vital Signs:   Temp:  [98 °F (36.7 °C)] 98 °F (36.7 °C)  Heart Rate:  [46-64] 46  Resp:  [18] 18  BP: (135-150)/(85-90) 135/85  Flow (L/min):  [2] 2     Physical Exam:  NAD, alert and oriented  OP clear, dry MM  Neck supple  No LAD  RRR  CTAB  +BS, soft  HANNAH  Normal affect  LUE wrapped  No change from     Results Reviewed:  LAB RESULTS:      Lab 24  0813 24  0529 24  1707   WBC 10.61  --   --  13.70*   HEMOGLOBIN 14.7  --   --  16.2*   HEMATOCRIT 45.7  --   --  49.7*   PLATELETS 225  --   --  243   NEUTROS ABS 6.82  --   --  11.02*   IMMATURE GRANS (ABS) 0.07*  --   --  0.14*   LYMPHS ABS 1.54  --   --  1.11   MONOS ABS 1.94*  --   --  1.39*   EOS ABS 0.19  --   --  0.01   MCV 95.0  --   --  93.2   PROCALCITONIN  --   --   --  0.11   LACTATE  --   --  1.7 3.2*   PROTIME  --  14.2  --   --          Lab 24  1005 24  1734 24  170   SODIUM 145  --  137   POTASSIUM 3.5  --  3.6   CHLORIDE 107  --  99   CO2 26.0  --  21.0*   ANION GAP 12.0  --  17.0*   BUN 22  --  22   CREATININE 0.75  --  0.92   EGFR 76.2  --  59.6*   GLUCOSE 88  --  167*   CALCIUM 8.7  --  9.4   MAGNESIUM 2.0  --  1.9   TSH  --  2.900  --          Lab 24  1707   TOTAL PROTEIN 6.9   ALBUMIN 3.8   GLOBULIN 3.1   ALT (SGPT) 17   AST (SGOT) 20   BILIRUBIN 0.5   ALK PHOS 97         Lab 24  0529   PROTIME 14.2   INR 1.09                 Brief Urine Lab Results  (Last result in the past 365 days)        Color   Clarity   Blood   Leuk Est   Nitrite   Protein   CREAT   Urine HCG        24 1814 Yellow   Clear   Negative   Trace   Negative    Negative                   Microbiology Results Abnormal       Procedure Component Value - Date/Time    Blood Culture - Blood, Arm, Right [[id number]]  (Normal) Collected: 07/08/24 1730    Lab Status: Preliminary result Specimen: Blood from Arm, Right Updated: 07/09/24 1801     Blood Culture No growth at 24 hours    Blood Culture - Blood, Hand, Left [[id number]]  (Normal) Collected: 07/08/24 1708    Lab Status: Preliminary result Specimen: Blood from Hand, Left Updated: 07/09/24 1732     Blood Culture No growth at 24 hours            MRI Lumbar Spine Without Contrast    Result Date: 7/9/2024  MRI LUMBAR SPINE WO CONTRAST Date of Exam: 7/9/2024 3:23 PM EDT Indication: L4 compression fracture.  Comparison: 10/7/2019 Technique:  Routine multiplanar/multisequence sequence images of the lumbar spine were obtained without contrast administration.  Findings: Mild loss of height of the superior endplate of L4 associated with linear edema consistent with an acute fracture. Chronic mild loss of height of the supreme plate of L1. Grade 1 anterolisthesis of L4 on L5. The intervertebral disc spaces are maintained. The conus medullaris terminates at the L2 vertebral body level. No cord tethering or filum terminale lipoma. The sacrum appears intact. Cholelithiasis. No retroperitoneal adenopathy. L1-L2: No significant spinal canal or foraminal narrowing. L2-L3: No significant spinal canal or foraminal narrowing. L3-L4: Disc bulge. Mild canal stenosis. Mild foraminal narrowing. L4-L5: Pseudobulge and disc bulge with ligamentum flavum thickening. Moderate canal stenosis. Mild foraminal narrowing. L5-S1: Minimal disc bulge. No significant canal stenosis. Mild right-sided foraminal narrowing.     Impression: Mild acute loss of height at the supreme plate of L4. No retropulsion of zone. Moderate canal stenosis at L4-L5. Electronically Signed: Miguel Tejeda MD  7/9/2024 3:46 PM EDT  Workstation ID: [id number]    CT Abdomen Pelvis Without  Contrast    Result Date: 7/8/2024  CT ABDOMEN PELVIS WO CONTRAST Date of Exam: 7/8/2024 5:40 PM EDT Indication: low back pain, concern for UTI, AMS. Comparison: None available. Technique: Axial CT images were obtained of the abdomen and pelvis without the administration of contrast. Reconstructed coronal and sagittal images were also obtained. Automated exposure control and iterative construction methods were used. Findings: Lung Bases:  There is bibasilar reticular lung thickening. Small hiatal hernia Liver:Liver is normal in size and shows homogeneous density. No focal lesions. Biliary/Gallbladder: There is a calcified gallstone measuring 1.6 cm. No pericholecystic fluid Spleen:Spleen is normal in size and CT density. Pancreas: Pancreas shows homogeneous density. There is no evidence of pancreatic mass or peripancreatic fluid. Kidneys: Kidneys are normal in size. There are no hydronephrosis or obstructing stones. There is a tiny punctate right renal calculi versus vascular calcification Adrenals: Adrenal glands are unremarkable. Retroperitoneal/Lymph Nodes/Vasculature: No retroperitoneal adenopathy is identified by size criteria. Gastrointestinal/Mesentery: The bowel loops are non-dilated without definite wall thickening or mass. The appendix appears within normal limits. No evidence of obstruction. No free air. Bladder: The bladder is unremarkable No acute abnormalities in the pelvis Bony Structures:  Visualized bony structures are consistent with the patient's age. Kyphoplasty changes are seen at T10 and L1. There is mild compression fracture deformity of L4 which appears acute incomplete evaluated in this exam. There is approximately 20 to 30% compression deformity and minimal 3.5 mm posterior cupping. There is grade 1 spondylolisthesis L4 on L5     Impression: Impression: 1. Small hiatal hernia 2. Cholelithiasis 3. Mild sigmoid diverticulosis 4  No evidence of hydronephrosis or obstructing stones 5. Mild acute  appearing compression fracture deformity of L4 Electronically Signed: Gee Menchaca MD  7/8/2024 6:01 PM EDT  Workstation ID: [id number]         Current medications:  Scheduled Meds:atenolol, 25 mg, Oral, BID  folic acid, 1 mg, Oral, Daily  latanoprost, 1 drop, Both Eyes, Nightly  predniSONE, 5 mg, Oral, Daily  sodium chloride, 10 mL, Intravenous, Q12H  timolol, 1 drop, Both Eyes, BID      Continuous Infusions:sodium chloride, 75 mL/hr, Last Rate: 75 mL/hr (07/09/24 1240)      PRN Meds:.  acetaminophen    senna-docusate sodium **AND** polyethylene glycol **AND** bisacodyl **AND** bisacodyl    HYDROcodone-acetaminophen    ondansetron    sodium chloride    sodium chloride    sodium chloride    Assessment & Plan   Assessment & Plan     Active Hospital Problems    Diagnosis  POA    **Pathologic compression fracture of lumbar vertebra [M48.56XA]  Yes    Closed compression fracture of L4 lumbar vertebra, initial encounter [S32.040A]  Yes    Rheumatoid arthritis involving multiple sites [M06.9]  Yes    PMR (polymyalgia rheumatica) [M35.3]  Yes    Essential hypertension [I10]  Yes    Macular degeneration [H35.30]  Yes    Elevated lactic acid level [R79.89]  Yes    Closed wedge compression fracture of fourth thoracic vertebra [S22.040A]  Unknown    Intractable back pain [M54.9]  Unknown      Resolved Hospital Problems   No resolved problems to display.        Brief Hospital Course to date:  Michelle Hoff is a 89 y.o. female with history of RA, PMR, macular degeneration, HTN, here with back pain and found to have L4 compression fracture    L4 compression fracture  -PT/OT  -NSG consulted, MRI reviewed, and findings noted, awaiting NSG follow up    Elevated lactate  Leukocytosis  -unremarkable UA  -volume depletion, IVF    Chronic L arm owund  -wound consult reviewed, continue wound care    Afib  -on atenolol, not on AC    RA  PMR  -immuncompromised  -on MTX/steroids  -on folate    HTN  -on atenolol    Debility  -PT/OT, PLOF  at home with walker, with daily caregivers    Expected Discharge Location and Transportation: Rehab  Expected Discharge   Expected Discharge Date: 7/11/2024; Expected Discharge Time:      VTE Prophylaxis:  Mechanical VTE prophylaxis orders are signed & held. Mechanical VTE prophylaxis orders are present.         AM-PAC 6 Clicks Score (PT): 6 (07/09/24 2000)    CODE STATUS:   Code Status and Medical Interventions:   Ordered at: 07/08/24 2051     Level Of Support Discussed With:    Patient     Code Status (Patient has no pulse and is not breathing):    CPR (Attempt to Resuscitate)     Medical Interventions (Patient has pulse or is breathing):    Full Support       Chris Gutierrez MD  07/10/24

## 2024-07-10 NOTE — PLAN OF CARE
Problem: Adult Inpatient Plan of Care  Goal: Plan of Care Review  Recent Flowsheet Documentation  Taken 7/10/2024 1416 by Vero Roa OT  Progress: improving  Plan of Care Reviewed With:   patient   daughter  Outcome Evaluation: OT IE completed. Pt is A/Ox3 and participates in therapy with good effort. Presents below her baseline with strength, balance, and activity tolerance deficits. Education initiated for spinal precautions and logroll sequencing. Mod Ax2 to enter and exit bed and maintain precautions. Dependent wtih LB ADLs and toileting at this time. AE contraindicated d/t decreased skin integrity BLE and risk for wounds. Pt is up in room and transfering with RW support and Min Ax2. Good effort. Reports pain improving. Unable to get comfortable in recliner. Pt returned to supine at end of session. OT will follow IP. Recommend IRF when pt is medically ready.

## 2024-07-10 NOTE — CASE MANAGEMENT/SOCIAL WORK
Continued Stay Note  [facility]     Patient Name: Michelle Hoff  MRN: [id number]  Today's Date: 7/10/2024    Admit Date: 7/8/2024    Plan: rehab   Discharge Plan       Row Name 07/10/24 1402       Plan    Plan Comments PT and OT are both recommending rehab for pt prior to returning home. CM spoke with pt who is agreeable to a referral to [facility]. April has the referral                   Discharge Codes    No documentation.                 Expected Discharge Date and Time       Expected Discharge Date Expected Discharge Time    Jul 11, 2024               Gabby Jackson RN

## 2024-07-10 NOTE — PLAN OF CARE
Goal Outcome Evaluation:  Plan of Care Reviewed With: patient        Progress: no change  Outcome Evaluation: PT initial eval completed. Pt presents below her functional baseline with weakness, increased pain, impaired balance, and decreased activity tolerance. Education provided on spinal precautions, pt verbalized understanding. Pt ambulated 30' with min Ax2 and RW. Further IPPT is warrented. PT rec d/c to IPR for best functional outcomes.      Anticipated Discharge Disposition (PT): inpatient rehabilitation facility

## 2024-07-10 NOTE — CASE MANAGEMENT/SOCIAL WORK
Continued Stay Note  [facility]     Patient Name: Michelle Hoff  MRN: [id number]  Today's Date: 7/10/2024    Admit Date: 7/8/2024    Plan: rehab   Discharge Plan       Row Name 07/10/24 0916       Plan    Plan Comments CM is following for neuro to decide on possible surgery and for PT/OT evals. Once these are complete CM will make referrals as needed                   Discharge Codes    No documentation.                 Expected Discharge Date and Time       Expected Discharge Date Expected Discharge Time    Jul 11, 2024               Gabby Jackson RN

## 2024-07-10 NOTE — PROGRESS NOTES
HOD# : 1    No events last night      Pathologic compression fracture of lumbar vertebra    Rheumatoid arthritis involving multiple sites    PMR (polymyalgia rheumatica)    Essential hypertension    Macular degeneration    Elevated lactic acid level    Closed compression fracture of L4 lumbar vertebra, initial encounter    Closed wedge compression fracture of fourth thoracic vertebra    Intractable back pain      Temp:  [98 °F (36.7 °C)] 98 °F (36.7 °C)  Heart Rate:  [46-71] 71  Resp:  [18] 18  BP: (135)/(85) 135/85  I/O last 3 completed shifts:  In: 600 [P.O.:600]  Out: 650 [Urine:650]  No intake/output data recorded.  Vital signs were reviewed and documented in the chart      EXAM   Patient appeared in good neurologic function with normal comprehension   CN grossly intact  Moves all extremities to command awake alert follows commands appears in no distress was able to sit up in bed which she has not been able to do    MRI confirms.  Endplate fracture L4    PLAN           Offered kyphoplasty however patient had marked improvement overnight    Will defer that in favor of outpatient follow-up    Scheduled nonsteroidal anti-inflammatories    Discussed with her and her daughter plan  Follow-up 3 weeks neurosurgery clinic

## 2024-07-11 VITALS
WEIGHT: 139.4 LBS | SYSTOLIC BLOOD PRESSURE: 142 MMHG | OXYGEN SATURATION: 96 % | RESPIRATION RATE: 16 BRPM | HEART RATE: 54 BPM | HEIGHT: 62 IN | BODY MASS INDEX: 25.65 KG/M2 | DIASTOLIC BLOOD PRESSURE: 72 MMHG | TEMPERATURE: 97.8 F

## 2024-07-11 PROCEDURE — 99239 HOSP IP/OBS DSCHRG MGMT >30: CPT | Performed by: HOSPITALIST

## 2024-07-11 PROCEDURE — 63710000001 PREDNISONE PER 5 MG: Performed by: NURSE PRACTITIONER

## 2024-07-11 RX ORDER — HYDROCODONE BITARTRATE AND ACETAMINOPHEN 5; 325 MG/1; MG/1
1 TABLET ORAL EVERY 6 HOURS PRN
Start: 2024-07-11 | End: 2024-07-13

## 2024-07-11 RX ORDER — ACETAMINOPHEN 325 MG/1
650 TABLET ORAL EVERY 6 HOURS PRN
Start: 2024-07-11

## 2024-07-11 RX ORDER — ATENOLOL 25 MG/1
25 TABLET ORAL 2 TIMES DAILY
Start: 2024-07-11

## 2024-07-11 RX ADMIN — FOLIC ACID 1 MG: 1 TABLET ORAL at 08:29

## 2024-07-11 RX ADMIN — Medication 10 ML: at 08:30

## 2024-07-11 RX ADMIN — ACETAMINOPHEN 650 MG: 325 TABLET ORAL at 08:29

## 2024-07-11 RX ADMIN — PREDNISONE 5 MG: 5 TABLET ORAL at 08:29

## 2024-07-11 RX ADMIN — TIMOLOL MALEATE 1 DROP: 2.5 SOLUTION/ DROPS OPHTHALMIC at 08:29

## 2024-07-11 RX ADMIN — ATENOLOL 25 MG: 25 TABLET ORAL at 08:29

## 2024-07-11 NOTE — CASE MANAGEMENT/SOCIAL WORK
Case Management Discharge Note      Final Note: Pt has been accepted to [facility] Spinal Cord unit today with transport at 1130 via [facility]. Report can be called to [phone]. Pt and family in agreement with plan         Selected Continued Care - Admitted Since 7/8/2024       Destination    No services have been selected for the patient.                Durable Medical Equipment    No services have been selected for the patient.                Dialysis/Infusion    No services have been selected for the patient.                Home Medical Care    No services have been selected for the patient.                Therapy    No services have been selected for the patient.                Community Resources    No services have been selected for the patient.                Community & DME    No services have been selected for the patient.                         Final Discharge Disposition Code: 62 - inpatient rehab facility

## 2024-07-11 NOTE — DISCHARGE SUMMARY
[facility] Medicine Services  DISCHARGE SUMMARY    Patient Name: Michelle Hoff  : 1935  MRN: [id number]    Date of Admission: 2024  5:08 PM  Date of Discharge:  2024  Primary Care Physician: Alma Casanova MD    Consults       Date and Time Order Name Status Description    2024  8:51 PM Inpatient Neurosurgery Consult Completed             Hospital Course     Presenting Problem: Back pain    Active Hospital Problems    Diagnosis  POA    **Pathologic compression fracture of lumbar vertebra [M48.56XA]  Yes    Closed compression fracture of L4 lumbar vertebra, initial encounter [S32.040A]  Yes    Rheumatoid arthritis involving multiple sites [M06.9]  Yes    PMR (polymyalgia rheumatica) [M35.3]  Yes    Essential hypertension [I10]  Yes    Macular degeneration [H35.30]  Yes    Elevated lactic acid level [R79.89]  Yes    Closed wedge compression fracture of fourth thoracic vertebra [S22.040A]  Unknown    Intractable back pain [M54.9]  Unknown      Resolved Hospital Problems   No resolved problems to display.          Hospital Course:  Michelle Hoff is a 89 y.o. female with history of RA, PMR, macular degeneration, HTN, here with back pain and found to have L4 compression fracture     L4 compression fracture, per MRI with acute findings  -The patient was participated with PT/OT. She was evaluated by NSG. Initially the patient was being evaluated for kyphoplasty, but the patient's pain improved, and NSG is recommending PT/OT with pain relief, with follow up in 3 weeks.      Afib  -She remains on atenolol, and she is not on anticoagulation     RA  PMR  -immuncompromised  -on MTX/steroids  -on folate     HTN  -on atenolol     Debility  -PT/OT, PLOF at home with walker, with daily caregivers  Discharge Follow Up Recommendations for outpatient labs/diagnostics:  As written    Day of Discharge     HPI: Back pain better. Denies f/c/dyspnea. Tolerating PO.     Review of  Systems  As above    Vital Signs:   Heart Rate:  [48-71] 53  Resp:  [16] 16  BP: (142-144)/(72-80) 142/72  Flow (L/min):  [1.5-2] 2      Physical Exam:  NAD, alert and oriented  OP clear, dry MM  Neck supple  No LAD  RRR  CTAB  +BS, soft  HANNAH  Normal affect    Pertinent  and/or Most Recent Results     LAB RESULTS:      Lab 07/09/24  0813 07/09/24  0529 07/08/24 2022 07/08/24  1707   WBC 10.61  --   --  13.70*   HEMOGLOBIN 14.7  --   --  16.2*   HEMATOCRIT 45.7  --   --  49.7*   PLATELETS 225  --   --  243   NEUTROS ABS 6.82  --   --  11.02*   IMMATURE GRANS (ABS) 0.07*  --   --  0.14*   LYMPHS ABS 1.54  --   --  1.11   MONOS ABS 1.94*  --   --  1.39*   EOS ABS 0.19  --   --  0.01   MCV 95.0  --   --  93.2   PROCALCITONIN  --   --   --  0.11   LACTATE  --   --  1.7 3.2*   PROTIME  --  14.2  --   --          Lab 07/09/24  1005 07/08/24  1734 07/08/24  1707   SODIUM 145  --  137   POTASSIUM 3.5  --  3.6   CHLORIDE 107  --  99   CO2 26.0  --  21.0*   ANION GAP 12.0  --  17.0*   BUN 22  --  22   CREATININE 0.75  --  0.92   EGFR 76.2  --  59.6*   GLUCOSE 88  --  167*   CALCIUM 8.7  --  9.4   MAGNESIUM 2.0  --  1.9   TSH  --  2.900  --          Lab 07/08/24  1707   TOTAL PROTEIN 6.9   ALBUMIN 3.8   GLOBULIN 3.1   ALT (SGPT) 17   AST (SGOT) 20   BILIRUBIN 0.5   ALK PHOS 97         Lab 07/09/24  0529   PROTIME 14.2   INR 1.09                 Brief Urine Lab Results  (Last result in the past 365 days)        Color   Clarity   Blood   Leuk Est   Nitrite   Protein   CREAT   Urine HCG        07/08/24 1814 Yellow   Clear   Negative   Trace   Negative   Negative                 Microbiology Results (last 10 days)       Procedure Component Value - Date/Time    Blood Culture - Blood, Arm, Right [[id number]]  (Normal) Collected: 07/08/24 1730    Lab Status: Preliminary result Specimen: Blood from Arm, Right Updated: 07/10/24 1800     Blood Culture No growth at 2 days    Blood Culture - Blood, Hand, Left [[id number]]  (Normal)  Collected: 07/08/24 1708    Lab Status: Preliminary result Specimen: Blood from Hand, Left Updated: 07/10/24 1731     Blood Culture No growth at 2 days            MRI Lumbar Spine Without Contrast    Result Date: 7/9/2024  MRI LUMBAR SPINE WO CONTRAST Date of Exam: 7/9/2024 3:23 PM EDT Indication: L4 compression fracture.  Comparison: 10/7/2019 Technique:  Routine multiplanar/multisequence sequence images of the lumbar spine were obtained without contrast administration.  Findings: Mild loss of height of the superior endplate of L4 associated with linear edema consistent with an acute fracture. Chronic mild loss of height of the supreme plate of L1. Grade 1 anterolisthesis of L4 on L5. The intervertebral disc spaces are maintained. The conus medullaris terminates at the L2 vertebral body level. No cord tethering or filum terminale lipoma. The sacrum appears intact. Cholelithiasis. No retroperitoneal adenopathy. L1-L2: No significant spinal canal or foraminal narrowing. L2-L3: No significant spinal canal or foraminal narrowing. L3-L4: Disc bulge. Mild canal stenosis. Mild foraminal narrowing. L4-L5: Pseudobulge and disc bulge with ligamentum flavum thickening. Moderate canal stenosis. Mild foraminal narrowing. L5-S1: Minimal disc bulge. No significant canal stenosis. Mild right-sided foraminal narrowing.     Mild acute loss of height at the supreme plate of L4. No retropulsion of zone. Moderate canal stenosis at L4-L5. Electronically Signed: Miguel Tejeda MD  7/9/2024 3:46 PM EDT  Workstation ID: [id number]    CT Abdomen Pelvis Without Contrast    Result Date: 7/8/2024  CT ABDOMEN PELVIS WO CONTRAST Date of Exam: 7/8/2024 5:40 PM EDT Indication: low back pain, concern for UTI, AMS. Comparison: None available. Technique: Axial CT images were obtained of the abdomen and pelvis without the administration of contrast. Reconstructed coronal and sagittal images were also obtained. Automated exposure control and iterative  construction methods were used. Findings: Lung Bases:  There is bibasilar reticular lung thickening. Small hiatal hernia Liver:Liver is normal in size and shows homogeneous density. No focal lesions. Biliary/Gallbladder: There is a calcified gallstone measuring 1.6 cm. No pericholecystic fluid Spleen:Spleen is normal in size and CT density. Pancreas: Pancreas shows homogeneous density. There is no evidence of pancreatic mass or peripancreatic fluid. Kidneys: Kidneys are normal in size. There are no hydronephrosis or obstructing stones. There is a tiny punctate right renal calculi versus vascular calcification Adrenals: Adrenal glands are unremarkable. Retroperitoneal/Lymph Nodes/Vasculature: No retroperitoneal adenopathy is identified by size criteria. Gastrointestinal/Mesentery: The bowel loops are non-dilated without definite wall thickening or mass. The appendix appears within normal limits. No evidence of obstruction. No free air. Bladder: The bladder is unremarkable No acute abnormalities in the pelvis Bony Structures:  Visualized bony structures are consistent with the patient's age. Kyphoplasty changes are seen at T10 and L1. There is mild compression fracture deformity of L4 which appears acute incomplete evaluated in this exam. There is approximately 20 to 30% compression deformity and minimal 3.5 mm posterior cupping. There is grade 1 spondylolisthesis L4 on L5     Impression: 1. Small hiatal hernia 2. Cholelithiasis 3. Mild sigmoid diverticulosis 4  No evidence of hydronephrosis or obstructing stones 5. Mild acute appearing compression fracture deformity of L4 Electronically Signed: Gee Menchaca MD  7/8/2024 6:01 PM EDT  Workstation ID: [id number]                 Plan for Follow-up of Pending Labs/Results: Reviewed  Pending Labs       Order Current Status    Blood Culture - Blood, Arm, Right Preliminary result    Blood Culture - Blood, Hand, Left Preliminary result          Discharge Details         Discharge Medications        New Medications        Instructions Start Date   HYDROcodone-acetaminophen 5-325 MG per tablet  Commonly known as: NORCO   1 tablet, Oral, Every 6 Hours PRN             Changes to Medications        Instructions Start Date   acetaminophen 325 MG tablet  Commonly known as: TYLENOL  What changed:   medication strength  how much to take  when to take this  reasons to take this   650 mg, Oral, Every 6 Hours PRN             Continue These Medications        Instructions Start Date   atenolol 25 MG tablet  Commonly known as: TENORMIN   25 mg, Oral, 2 Times Daily      calcitonin (salmon) 200 UNIT/ACT nasal spray  Commonly known as: Miacalcin   Use 1 spray daily Alternating Nostrils.      Cholecalciferol 50 MCG (2000 UT) capsule   Vitamin D3 50 mcg (2,000 unit) capsule   Take 1 capsule every day by oral route.      estradiol 0.1 MG/GM vaginal cream  Commonly known as: ESTRACE   APPLY A PEA SIZED AMOUNT OF ESTRADIOL AROUND URETHRA ONCE AT BEDTIME      folic acid 1 MG tablet  Commonly known as: FOLVITE   1 mg, Oral, Daily      latanoprost 0.005 % ophthalmic solution  Commonly known as: XALATAN   1 drop, Both Eyes, Nightly      methotrexate 2.5 MG tablet   7.5 mg, Oral, Weekly, q monday       predniSONE 2.5 MG tablet  Commonly known as: DELTASONE   5 mg, Oral, Daily      timolol 0.25 % ophthalmic solution  Commonly known as: TIMOPTIC   1 drop, Both Eyes, 2 Times Daily             Stop These Medications      furosemide 20 MG tablet  Commonly known as: LASIX     mupirocin 2 % ointment  Commonly known as: BACTROBAN     potassium chloride 20 MEQ packet  Commonly known as: KLOR-CON              Allergies   Allergen Reactions    Augmentin [Amoxicillin-Pot Clavulanate] Unknown - High Severity    Cardizem [Diltiazem Hcl] Other (See Comments)     unknown    Metoprolol Other (See Comments)     unknown    Adhesive Tape Rash         Discharge Disposition:  Rehab Facility or Unit (DC -  External)    Diet:  Hospital:  Diet Order   Procedures    Diet: Regular/House; Fluid Consistency: Thin (IDDSI 0)            Activity:      Restrictions or Other Recommendations:         CODE STATUS:    Code Status and Medical Interventions:   Ordered at: 07/08/24 2051     Level Of Support Discussed With:    Patient     Code Status (Patient has no pulse and is not breathing):    CPR (Attempt to Resuscitate)     Medical Interventions (Patient has pulse or is breathing):    Full Support       Future Appointments   Date Time Provider Department Center   8/1/2024  1:00 PM Fausto Horton MD [facility]       Additional Instructions for the Follow-ups that You Need to Schedule       Discharge Follow-up with PCP   As directed       Currently Documented PCP:    Alma Casanova MD    PCP Phone Number:    [phone]     Follow Up Details: 1-2 weeks post-rehab        Discharge Follow-up with Specified Provider: Clifford ASHFORD, 3 weeks   As directed      To: Clifford ASHFORD, 3 weeks                      Chris Gutierrez MD  07/11/24      Time Spent on Discharge:  I spent  40  minutes on this discharge activity which included: face-to-face encounter with the patient, reviewing the data in the system, coordination of the care with the nursing staff as well as consultants, documentation, and entering orders.

## 2024-07-11 NOTE — PLAN OF CARE
Problem: Adult Inpatient Plan of Care  Goal: Plan of Care Review  Outcome: Ongoing, Progressing  Goal: Patient-Specific Goal (Individualized)  Outcome: Ongoing, Progressing  Goal: Absence of Hospital-Acquired Illness or Injury  Outcome: Ongoing, Progressing  Intervention: Identify and Manage Fall Risk  Description: Perform standard risk assessment on admission using a validated tool or comprehensive approach appropriate to the patient; reassess fall risk frequently, with change in status or transfer to another level of care.  Communicate fall injury risk to interprofessional healthcare team.  Determine need for increased observation, equipment and environmental modification, such as low bed, signage and supportive, nonskid footwear.  Adjust safety measures to individual developmental age, stage and identified risk factors.  Reinforce the importance of safety and physical activity with patient and family.  Perform regular intentional rounding to assess need for position change, pain assessment and personal needs, including assistance with toileting.  Recent Flowsheet Documentation  Taken 7/11/2024 0400 by Solange Horta RN  Safety Promotion/Fall Prevention:   activity supervised   assistive device/personal items within reach   fall prevention program maintained   lighting adjusted   mobility aid in reach   gait belt   nonskid shoes/slippers when out of bed   room organization consistent   safety round/check completed   toileting scheduled  Taken 7/11/2024 0200 by Solange Horta RN  Safety Promotion/Fall Prevention:   activity supervised   assistive device/personal items within reach   fall prevention program maintained   lighting adjusted   mobility aid in reach   gait belt   nonskid shoes/slippers when out of bed   room organization consistent   safety round/check completed   toileting scheduled  Taken 7/11/2024 0000 by Solange Horta RN  Safety Promotion/Fall Prevention:   activity supervised    assistive device/personal items within reach   fall prevention program maintained   lighting adjusted   mobility aid in reach   gait belt   nonskid shoes/slippers when out of bed   room organization consistent   safety round/check completed   toileting scheduled  Taken 7/10/2024 2200 by Solange Horta RN  Safety Promotion/Fall Prevention: activity supervised  Taken 7/10/2024 1935 by Solange Horta RN  Safety Promotion/Fall Prevention:   toileting scheduled   safety round/check completed   room organization consistent   nonskid shoes/slippers when out of bed   muscle strengthening facilitated   mobility aid in [facility]   lighting adjusted   fall prevention program maintained   activity supervised   clutter free environment maintained  Intervention: Prevent Skin Injury  Description: Perform a screening for skin injury risk, such as pressure or moisture associated skin damage on admission and at regular intervals throughout hospital stay.  Keep all areas of skin (especially folds) clean and dry.  Maintain adequate skin hydration.  Relieve and redistribute pressure and protect bony prominences; implement measures based on patient-specific risk factors.  Match turning and repositioning schedule to clinical condition.  Encourage weight shift frequently; assist with reposition if unable to complete independently.  Float heels off bed; avoid pressure on the Achilles tendon.  Keep skin free from extended contact with medical devices.  Encourage functional activity and mobility, as early as tolerated.  Use aids (e.g., slide boards, mechanical lift) during transfer.  Recent Flowsheet Documentation  Taken 7/11/2024 0400 by Solange Horta RN  Skin Protection:   adhesive use limited   skin-to-device areas padded   skin-to-skin areas padded   transparent dressing maintained   tubing/devices free from skin contact  Taken 7/11/2024 0200 by Solange Horta RN  Skin Protection:   adhesive use limited   skin-to-device  areas padded   skin-to-skin areas padded   transparent dressing maintained   tubing/devices free from skin contact  Taken 7/11/2024 0000 by Solange Horta RN  Skin Protection:   adhesive use limited   skin-to-device areas padded   skin-to-skin areas padded   transparent dressing maintained   tubing/devices free from skin contact  Taken 7/10/2024 2200 by Solange Horat RN  Body Position: position changed independently  Skin Protection: adhesive use limited  Taken 7/10/2024 1935 by Solange Horta RN  Body Position: supine  Intervention: Prevent and Manage VTE (Venous Thromboembolism) Risk  Description: Assess for VTE (venous thromboembolism) risk.  Encourage and assist with early ambulation.  Initiate and maintain compression or other therapy, as indicated, based on identified risk in accordance with organizational protocol and provider order.  Encourage both active and passive leg exercises while in bed, if unable to ambulate.  Recent Flowsheet Documentation  Taken 7/11/2024 0400 by Solange Horta RN  Activity Management: activity encouraged  VTE Prevention/Management: patient refused intervention  Taken 7/11/2024 0200 by Solange Horta RN  Activity Management: activity encouraged  VTE Prevention/Management: patient refused intervention  Taken 7/11/2024 0000 by Solange Horta RN  Activity Management: activity encouraged  Taken 7/10/2024 2200 by Solange Horta RN  Activity Management: activity encouraged  VTE Prevention/Management: patient refused intervention  Taken 7/10/2024 1935 by Solange Horta RN  VTE Prevention/Management: patient refused intervention  Range of Motion: active ROM (range of motion) encouraged  Intervention: Prevent Infection  Description: Maintain skin and mucous membrane integrity; promote hand, oral and pulmonary hygiene.  Optimize fluid balance, nutrition, sleep and glycemic control to maximize infection resistance.  Identify potential sources of  infection early to prevent or mitigate progression of infection (e.g., wound, lines, devices).  Evaluate ongoing need for invasive devices; remove promptly when no longer indicated.  Recent Flowsheet Documentation  Taken 7/11/2024 0400 by Solange Horta RN  Infection Prevention:   single patient room provided   environmental surveillance performed  Taken 7/11/2024 0200 by Solange Horta RN  Infection Prevention:   single patient room provided   environmental surveillance performed  Taken 7/11/2024 0000 by Solange Horta RN  Infection Prevention:   single patient room provided   environmental surveillance performed  Taken 7/10/2024 2200 by Solange Horta RN  Infection Prevention:   environmental surveillance performed   personal protective equipment utilized   rest/sleep promoted   single patient room provided   visitors restricted/screened  Taken 7/10/2024 1935 by Solange Horta RN  Infection Prevention:   single patient room provided   visitors restricted/screened  Goal: Optimal Comfort and Wellbeing  Outcome: Ongoing, Progressing  Intervention: Monitor Pain and Promote Comfort  Description: Assess pain level, treatment efficacy and patient response at regular intervals using a consistent pain scale.  Consider the presence and impact of preexisting chronic pain.  Encourage patient and caregiver involvement in pain assessment, interventions and safety measures.  Recent Flowsheet Documentation  Taken 7/10/2024 1935 by Solange Horta RN  Pain Management Interventions:   position adjusted   pillow support provided  Intervention: Provide Person-Centered Care  Description: Use a family-focused approach to care.  Develop trust and rapport by proactively providing information, encouraging questions, addressing concerns and offering reassurance.  Acknowledge emotional response to hospitalization.  Recognize and utilize personal coping strategies.  Honor spiritual and cultural  preferences.  Recent Flowsheet Documentation  Taken 7/10/2024 1935 by Solange Horta RN  Trust Relationship/Rapport:   care explained   choices provided   reassurance provided   thoughts/feelings acknowledged  Goal: Readiness for Transition of Care  Outcome: Ongoing, Progressing     Problem: Pain Chronic (Persistent) (Comorbidity Management)  Goal: Acceptable Pain Control and Functional Ability  Outcome: Ongoing, Progressing  Intervention: Manage Persistent Pain  Description: Evaluate pain level, effect of treatment and patient response at regular intervals.  Minimize pain stimuli; coordinate care and adjust environment (e.g., light, noise, unnecessary movement); promote sleep/rest.  Match pharmacologic analgesia to severity and type of pain mechanism (e.g., neuropathic, muscle, inflammatory); consider multimodal approach (e.g., nonopioid, opioid, adjuvant).  Provide medication at regular intervals; titrate to patient response.  Manage breakthrough pain with additional doses; consider rotation or switching medication.  Monitor for signs of substance tolerance (increased dose to reach desired effect, decreased effect with same dose).  Avoid abrupt withdrawal of medication, especially agents capable of causing physical dependence.  Manage medication-induced effects, such as constipation, nausea, pruritus, urinary retention, somnolence and dizziness.  Provide multimodal treatment interventions, such as physical activity, therapeutic exercise, yoga, TENS (transcutaneous electrical nerve stimulation) and manual therapy.  Train in functional activity modifications, such as body mechanics, posture, ergonomics, energy conservation and activity pacing.  Consider addition of complementary or alternative therapy, such as acupuncture, hypnosis or therapeutic touch.  Recent Flowsheet Documentation  Taken 7/11/2024 0400 by Solange Horta, RN  Medication Review/Management: medications reviewed  Taken 7/11/2024 0200 by  Solange Horta RN  Medication Review/Management: medications reviewed  Taken 7/11/2024 0000 by Solange Horta RN  Medication Review/Management: medications reviewed  Taken 7/10/2024 2200 by Solange Horta RN  Medication Review/Management: medications reviewed  Taken 7/10/2024 1935 by Solange Horta RN  Medication Review/Management: medications reviewed  Intervention: Develop Pain Management Plan  Description: Acknowledge patient as the expert in pain self-management.  Use a consistent, validated tool for pain assessment; include function and quality of life.  Evaluate risk for opioid use and dependence.  Set pain management goals; determine acceptable level of discomfort to allow for maximal functioning and quality of life.  Determine [id number] pain management plan, including both pharmacologic and nonpharmacologic measures.  Identify and integrate past successful treatment measures, if able.  Encourage patient and caregiver involvement in pain assessment, interventions and safety measures.  Re-evaluate plan regularly.  Recent Flowsheet Documentation  Taken 7/10/2024 1935 by Solange Horta RN  Pain Management Interventions:   position adjusted   pillow support provided  Intervention: Optimize Psychosocial Wellbeing  Description: Facilitate patient’s self-control over pain by providing pain information and allowing choices in treatment.  Consider and address emotional response to pain.  Explore and promote use of coping strategies; address barriers to successful coping.  Evaluate and assist with psychosocial, cultural and spiritual factors impacting pain.  Modify pain perception by using techniques, such as distraction, mindfulness, guided imagery, meditation or music.  Assess and monitor for signs and symptoms of behavioral health concerns, such as unhealthy substance use, depression and suicidal ideation.  Consider referral for ongoing coping support, such as cognitive behavioral  therapy and mindfulness-based stress reduction.  Recent Flowsheet Documentation  Taken 7/10/2024 1935 by Solange Horta, RN  Diversional Activities: television  Family/Support System Care:   self-care encouraged   support provided     Problem: Skin Injury Risk Increased  Goal: Skin Health and Integrity  Outcome: Ongoing, Progressing  Intervention: Optimize Skin Protection  Description: Perform a full pressure injury risk assessment, as indicated by screening, upon admission to care unit.  Reassess skin (injury risk, full inspection) frequently (e.g., scheduled interval, with change in condition) to provide optimal early detection and prevention.  Maintain adequate tissue perfusion (e.g., encourage fluid balance; avoid crossing legs, constrictive clothing or devices) to promote tissue oxygenation.  Maintain head of bed at lowest degree of elevation tolerated, considering medical condition and other restrictions.  Avoid positioning onto an area that remains reddened.  Minimize incontinence and moisture (e.g., toileting schedule; moisture-wicking pad, diaper or incontinence collection device; skin moisture barrier).  Cleanse skin promptly and gently when soiled utilizing a pH-balanced cleanser.  Relieve and redistribute pressure (e.g., scheduled position changes, weight shifts, use of support surface, medical device repositioning, protective dressing application, use of positioning device, microclimate control, use of pressure-injury-monitor  Encourage increased activity, such as sitting in a chair at the bedside or early mobilization, when able to tolerate.  Recent Flowsheet Documentation  Taken 7/11/2024 0400 by Solange Horta, RN  Pressure Reduction Techniques:   weight shift assistance provided   heels elevated off bed  Head of Bed (HOB) Positioning: HOB elevated  Pressure Reduction Devices: pressure-redistributing mattress utilized  Skin Protection:   adhesive use limited   skin-to-device areas padded    skin-to-skin areas padded   transparent dressing maintained   tubing/devices free from skin contact  Taken 7/11/2024 0200 by Solange Horta RN  Pressure Reduction Techniques:   weight shift assistance provided   heels elevated off bed  Head of Bed (HOB) Positioning: HOB elevated  Pressure Reduction Devices: pressure-redistributing mattress utilized  Skin Protection:   adhesive use limited   skin-to-device areas padded   skin-to-skin areas padded   transparent dressing maintained   tubing/devices free from skin contact  Taken 7/11/2024 0000 by Solange Horta RN  Pressure Reduction Techniques:   weight shift assistance provided   heels elevated off bed  Head of Bed (HOB) Positioning: HOB elevated  Pressure Reduction Devices: pressure-redistributing mattress utilized  Skin Protection:   adhesive use limited   skin-to-device areas padded   skin-to-skin areas padded   transparent dressing maintained   tubing/devices free from skin contact  Taken 7/10/2024 2200 by Solange Horta RN  Pressure Reduction Techniques: frequent weight shift encouraged  Head of Bed (HOB) Positioning: HOB at 30-45 degrees  Pressure Reduction Devices: pressure-redistributing mattress utilized  Skin Protection: adhesive use limited  Taken 7/10/2024 1935 by Solange Horta RN  Head of Bed (HOB) Positioning: HOB elevated     Problem: Fall Injury Risk  Goal: Absence of Fall and Fall-Related Injury  Outcome: Ongoing, Progressing  Intervention: Identify and Manage Contributors  Description: Develop a fall prevention plan with the patient and caregiver/family.  Provide reorientation, appropriate sensory stimulation and routines with changes in mental status to decrease risk of fall.  Promote use of personal vision and auditory aids.  Assess assistance level required for safe and effective self-care; provide support as needed, such as toileting, mobilization. For age 65 and older, implement timed toileting with assistance.  Encourage  physical activity, such as performance of mobility and self-care at highest level of patient ability, multicomponent exercise program and provision of appropriate assistive devices.  If fall occurs, assess the severity of injury; implement fall injury protocol. Determine the cause and revise fall injury prevention plan.  Regularly review medication contribution to fall risk; adjust medication administration times to minimize risk of falling.  Consider risk related to polypharmacy and age.  Balance adequate pain management with potential for oversedation.  Recent Flowsheet Documentation  Taken 7/11/2024 0400 by Solange Horta RN  Medication Review/Management: medications reviewed  Taken 7/11/2024 0200 by Solange Horta RN  Medication Review/Management: medications reviewed  Taken 7/11/2024 0000 by Solange Horta RN  Medication Review/Management: medications reviewed  Taken 7/10/2024 2200 by Solange Horta RN  Medication Review/Management: medications reviewed  Taken 7/10/2024 1935 by Solange Horta RN  Medication Review/Management: medications reviewed  Intervention: Promote Injury-Free Environment  Description: Provide a safe, barrier-free environment that encourages independent activity.  Keep care area uncluttered and well-lighted.  Determine need for increased observation or monitoring.  Avoid use of devices that minimize mobility, such as restraints or indwelling urinary catheter.  Recent Flowsheet Documentation  Taken 7/11/2024 0400 by Solange Horta RN  Safety Promotion/Fall Prevention:   activity supervised   assistive device/personal items within reach   fall prevention program maintained   lighting adjusted   mobility aid in reach   gait belt   nonskid shoes/slippers when out of bed   room organization consistent   safety round/check completed   toileting scheduled  Taken 7/11/2024 0200 by Solange Horta RN  Safety Promotion/Fall Prevention:   activity supervised    assistive device/personal items within reach   fall prevention program maintained   lighting adjusted   mobility aid in reach   gait belt   nonskid shoes/slippers when out of bed   room organization consistent   safety round/check completed   toileting scheduled  Taken 7/11/2024 0000 by Solange Horta RN  Safety Promotion/Fall Prevention:   activity supervised   assistive device/personal items within reach   fall prevention program maintained   lighting adjusted   mobility aid in reach   gait belt   nonskid shoes/slippers when out of bed   room organization consistent   safety round/check completed   toileting scheduled  Taken 7/10/2024 2200 by Solange Horta, RN  Safety Promotion/Fall Prevention: activity supervised  Taken 7/10/2024 1935 by Solange Horta RN  Safety Promotion/Fall Prevention:   toileting scheduled   safety round/check completed   room organization consistent   nonskid shoes/slippers when out of bed   muscle strengthening facilitated   mobility aid in reach   lighting adjusted   fall prevention program maintained   activity supervised   clutter free environment maintained   Goal Outcome Evaluation:      Patient is alert and oriented X4, anxious, reluctant to some medical care. Refused to let staff get vital signs more than Qshift. Per pt, MD is aware. Medicated per eMAR. VSS. No acute events overnight. One large incontinent void. D/C planning in progress. Solange Horta RN   \

## 2024-07-13 LAB
BACTERIA SPEC AEROBE CULT: NORMAL
BACTERIA SPEC AEROBE CULT: NORMAL

## 2024-07-22 ENCOUNTER — PREP FOR SURGERY (OUTPATIENT)
Dept: OTHER | Facility: HOSPITAL | Age: 89
End: 2024-07-22
Payer: MEDICARE

## 2024-07-22 ENCOUNTER — OFFICE VISIT (OUTPATIENT)
Dept: NEUROSURGERY | Facility: CLINIC | Age: 89
End: 2024-07-22
Payer: MEDICARE

## 2024-07-22 VITALS — HEIGHT: 62 IN | WEIGHT: 135 LBS | BODY MASS INDEX: 24.84 KG/M2 | TEMPERATURE: 97.3 F

## 2024-07-22 DIAGNOSIS — M80.08XK: Primary | ICD-10-CM

## 2024-07-22 DIAGNOSIS — M80.08XA AGE-RELATED OSTEOPOROSIS WITH CURRENT PATHOLOGICAL FRACTURE OF VERTEBRA, INITIAL ENCOUNTER: ICD-10-CM

## 2024-07-22 DIAGNOSIS — M80.88XK: Primary | ICD-10-CM

## 2024-07-22 DIAGNOSIS — M80.88XK: ICD-10-CM

## 2024-07-22 PROBLEM — S32.040K: Status: ACTIVE | Noted: 2024-07-22

## 2024-07-22 PROCEDURE — 99214 OFFICE O/P EST MOD 30 MIN: CPT | Performed by: NEUROLOGICAL SURGERY

## 2024-07-22 RX ORDER — KETOCONAZOLE 20 MG/G
CREAM TOPICAL
COMMUNITY
Start: 2024-05-13

## 2024-07-22 RX ORDER — DORZOLAMIDE HYDROCHLORIDE AND TIMOLOL MALEATE 20; 5 MG/ML; MG/ML
1 SOLUTION/ DROPS OPHTHALMIC 2 TIMES DAILY
COMMUNITY

## 2024-07-22 NOTE — PROGRESS NOTES
NAME: TOBIAS DOMINGUEZ   DOS: 2024  : 1935  PCP: Alma Casanova MD    Chief Complaint:    Chief Complaint   Patient presents with    L4 compression fx       History of Present Illness:  89 y.o. female   79-year-old female well-known to me for history of prior kyphoplasty's at multiple levels she was recently discharged after admission for a superior endplate fracture  She reports that she has not been receiving adequate pain control and has pain a 7 out of 10 today while sitting in a wheelchair      PMHX  Allergies:  Allergies   Allergen Reactions    Augmentin [Amoxicillin-Pot Clavulanate] Unknown - High Severity    Cardizem [Diltiazem Hcl] Other (See Comments)     unknown    Metoprolol Other (See Comments)     unknown    Adhesive Tape Rash     Medications    Current Outpatient Medications:     acetaminophen (TYLENOL) 325 MG tablet, Take 2 tablets by mouth Every 6 (Six) Hours As Needed for Mild Pain., Disp: , Rfl:     atenolol (TENORMIN) 25 MG tablet, Take 1 tablet by mouth 2 (Two) Times a Day., Disp: , Rfl:     calcitonin, salmon, (MIACALCIN) 200 UNIT/ACT nasal spray, Use 1 spray daily Alternating Nostrils., Disp: 3.7 mL, Rfl: 0    Cholecalciferol 50 MCG (2000 UT) capsule, Vitamin D3 50 mcg (2,000 unit) capsule  Take 1 capsule every day by oral route., Disp: , Rfl:     dorzolamide-timolol (COSOPT) 2-0.5 % ophthalmic solution, Administer 1 drop to both eyes 2 (Two) Times a Day., Disp: , Rfl:     estradiol (ESTRACE) 0.1 MG/GM vaginal cream, APPLY A PEA SIZED AMOUNT OF ESTRADIOL AROUND URETHRA ONCE AT BEDTIME, Disp: , Rfl:     folic acid (FOLVITE) 1 MG tablet, Take 1 tablet by mouth Daily., Disp: , Rfl:     ketoconazole (NIZORAL) 2 % cream, APPLY TOPICALLY TO THE AFFECTED AREA EVERY DAY, Disp: , Rfl:     latanoprost (XALATAN) 0.005 % ophthalmic solution, Administer 1 drop to both eyes Every Night., Disp: , Rfl:     methotrexate 2.5 MG tablet, Take 3 tablets by mouth 1 (One) Time Per Week. q monday,  Disp: , Rfl:     predniSONE (DELTASONE) 2.5 MG tablet, Take 2 tablets by mouth Daily., Disp: , Rfl:     timolol (TIMOPTIC) 0.25 % ophthalmic solution, Administer 1 drop to both eyes 2 (Two) Times a Day., Disp: , Rfl:   Past Medical History:  Past Medical History:   Diagnosis Date    Arthritis     Compression fracture of body of thoracic vertebra     Fracture     T9    Glaucoma     Hypertension     Macular degeneration     PMR (polymyalgia rheumatica)     RA (rheumatoid arthritis)     Skin tear of left lower leg without complication      Past Surgical History:  Past Surgical History:   Procedure Laterality Date    CATARACT EXTRACTION      COLONOSCOPY  APROX AT AGE 80    KYPHOPLASTY N/A 10/10/2019    Procedure: KYPHOPLASTY  T10, L1;  Surgeon: Fausto Horton MD;  Location: [facility];  Service: Neurosurgery     Social Hx:  Social History     Tobacco Use    Smoking status: Former     Current packs/day: 0.00     Types: Cigarettes     Quit date:      Years since quittin.5     Passive exposure: Past    Smokeless tobacco: Never   Vaping Use    Vaping status: Never Used   Substance Use Topics    Alcohol use: Yes     Alcohol/week: 1.0 - 2.0 standard drink of alcohol     Types: 1 - 2 Glasses of wine per week    Drug use: No     Family Hx:  Family History   Problem Relation Age of Onset    Uterine cancer Mother     Heart disease Father      Review of Systems:        Review of Systems   Constitutional:  Negative for activity change, appetite change, chills, diaphoresis, fatigue, fever and unexpected weight change.   HENT:  Negative for congestion, dental problem, drooling, ear discharge, ear pain, facial swelling, hearing loss, mouth sores, nosebleeds, postnasal drip, rhinorrhea, sinus pressure, sinus pain, sneezing, sore throat, tinnitus, trouble swallowing and voice change.    Eyes:  Negative for photophobia, pain, discharge, redness, itching and visual disturbance.   Respiratory:  Negative for apnea, cough,  choking, chest tightness, shortness of breath, wheezing and stridor.    Cardiovascular:  Negative for chest pain, palpitations and leg swelling.   Gastrointestinal:  Negative for abdominal distention, abdominal pain, anal bleeding, blood in stool, constipation, diarrhea, nausea, rectal pain and vomiting.   Endocrine: Negative for cold intolerance, heat intolerance, polydipsia, polyphagia and polyuria.   Genitourinary:  Negative for decreased urine volume, difficulty urinating, dysuria, enuresis, flank pain, frequency, genital sores, hematuria and urgency.   Musculoskeletal:  Positive for back pain. Negative for arthralgias, gait problem, joint swelling, myalgias, neck pain and neck stiffness.   Skin:  Negative for color change, pallor, rash and wound.   Allergic/Immunologic: Negative for environmental allergies, food allergies and immunocompromised state.   Neurological:  Negative for dizziness, tremors, seizures, syncope, facial asymmetry, speech difficulty, weakness, light-headedness, numbness and headaches.   Hematological:  Negative for adenopathy. Does not bruise/bleed easily.   Psychiatric/Behavioral:  Negative for agitation, behavioral problems, confusion, decreased concentration, dysphoric mood, hallucinations, self-injury, sleep disturbance and suicidal ideas. The patient is not nervous/anxious and is not hyperactive.    All other systems reviewed and are negative.  I have reviewed this note template and all pertinent parts of the review of systems social, family history, surgical history and medication list        Physical Examination:  Vitals:    07/22/24 1131   Temp: 97.3 °F (36.3 °C)      General Appearance:   Well developed, well nourished, well groomed, alert, and cooperative.  Neurological examination:  Neurologic Exam    She is wide-awake alert and follows commands    She is on oxygen and appears quite frail    She is strong in her upper and lower extremities when asked to flex and move her arms and  "legs    She has no evidence of clonus    No signs of intrinsic hip dysfunction    She can move her legs symmetrically I did not ask her to walk    She does have percussive tenderness in the low back  Review of Imaging/DATA:  I personally reviewed and interpreted her MRI of the lumbar spine she has a superior endplate fracture L4 with a chronic L4-5 spondylolisthesis  Diagnoses/Plan:    Ms. Hoff is a 89 y.o. female   1.  Advanced age  2.  Social stressors include living alone she has excellent familial support however may be meeting the ends of the resource availability for 24/7 care given her care plan needs they have discussed palliative care    3.  Superior endplate fracture L4 secondary to osteoporosis with pathologic healing    Plan  After extensive discussion and shared decision making she is elected to undergo L4 kyphoplasty I explained the outpatient nature of this procedure for the intractable pain and made no guarantees regarding a functional outcome    More importantly I spent 20 minutes with her and her family talking about a care plan she wishes to go home with assistance of \"her family \"however she may have unrealistic expectations about how to care for her at home given her advanced age and likely will require the assistance of  and potentially assisted living options if needed  -I explained that her postoperative care plan needs to be set prior to the procedure date-  Will make arrangements for L4 kyphoplasty   -We also talked about strategies found to decrease readmissions to the hospital etc.      "

## 2024-07-23 ENCOUNTER — ANESTHESIA EVENT (OUTPATIENT)
Dept: PERIOP | Facility: HOSPITAL | Age: 89
End: 2024-07-23
Payer: MEDICARE

## 2024-07-23 RX ORDER — SODIUM CHLORIDE 0.9 % (FLUSH) 0.9 %
10 SYRINGE (ML) INJECTION AS NEEDED
Status: CANCELLED | OUTPATIENT
Start: 2024-07-23

## 2024-07-23 RX ORDER — SODIUM CHLORIDE 9 MG/ML
40 INJECTION, SOLUTION INTRAVENOUS AS NEEDED
Status: CANCELLED | OUTPATIENT
Start: 2024-07-23

## 2024-07-23 RX ORDER — FAMOTIDINE 10 MG/ML
20 INJECTION, SOLUTION INTRAVENOUS ONCE
Status: CANCELLED | OUTPATIENT
Start: 2024-07-23 | End: 2024-07-23

## 2024-07-23 RX ORDER — SODIUM CHLORIDE 0.9 % (FLUSH) 0.9 %
10 SYRINGE (ML) INJECTION EVERY 12 HOURS SCHEDULED
Status: CANCELLED | OUTPATIENT
Start: 2024-07-23

## 2024-07-24 ENCOUNTER — HOSPITAL ENCOUNTER (OUTPATIENT)
Facility: HOSPITAL | Age: 89
Discharge: NURSING, REHAB OR LONG-TERM CARE | End: 2024-07-26
Attending: NEUROLOGICAL SURGERY | Admitting: INTERNAL MEDICINE
Payer: MEDICARE

## 2024-07-24 ENCOUNTER — ANESTHESIA (OUTPATIENT)
Dept: PERIOP | Facility: HOSPITAL | Age: 89
End: 2024-07-24
Payer: MEDICARE

## 2024-07-24 ENCOUNTER — APPOINTMENT (OUTPATIENT)
Dept: GENERAL RADIOLOGY | Facility: HOSPITAL | Age: 89
End: 2024-07-24
Payer: MEDICARE

## 2024-07-24 DIAGNOSIS — M80.88XK: ICD-10-CM

## 2024-07-24 DIAGNOSIS — M80.08XG FRACTURE OF VERTEBRA DUE TO OSTEOPOROSIS WITH DELAYED HEALING, SUBSEQUENT ENCOUNTER: Primary | ICD-10-CM

## 2024-07-24 PROBLEM — S32.000A LUMBAR COMPRESSION FRACTURE: Status: ACTIVE | Noted: 2024-07-24

## 2024-07-24 LAB
QT INTERVAL: 410 MS
QTC INTERVAL: 381 MS

## 2024-07-24 PROCEDURE — 25510000001 IOPAMIDOL 41 % SOLUTION: Performed by: NEUROLOGICAL SURGERY

## 2024-07-24 PROCEDURE — C1713 ANCHOR/SCREW BN/BN,TIS/BN: HCPCS | Performed by: NEUROLOGICAL SURGERY

## 2024-07-24 PROCEDURE — 63710000001 ATENOLOL 25 MG TABLET: Performed by: INTERNAL MEDICINE

## 2024-07-24 PROCEDURE — A9270 NON-COVERED ITEM OR SERVICE: HCPCS | Performed by: NURSE PRACTITIONER

## 2024-07-24 PROCEDURE — 25010000002 DEXAMETHASONE PER 1 MG: Performed by: NURSE ANESTHETIST, CERTIFIED REGISTERED

## 2024-07-24 PROCEDURE — 22510 PERQ CERVICOTHORACIC INJECT: CPT

## 2024-07-24 PROCEDURE — 63710000001 MUPIROCIN 2 % OINTMENT: Performed by: NEUROLOGICAL SURGERY

## 2024-07-24 PROCEDURE — 25810000003 LACTATED RINGERS PER 1000 ML: Performed by: STUDENT IN AN ORGANIZED HEALTH CARE EDUCATION/TRAINING PROGRAM

## 2024-07-24 PROCEDURE — 63710000001 TIMOLOL 0.5 % SOLUTION 5 ML BOTTLE: Performed by: NURSE PRACTITIONER

## 2024-07-24 PROCEDURE — A9270 NON-COVERED ITEM OR SERVICE: HCPCS | Performed by: STUDENT IN AN ORGANIZED HEALTH CARE EDUCATION/TRAINING PROGRAM

## 2024-07-24 PROCEDURE — A9270 NON-COVERED ITEM OR SERVICE: HCPCS | Performed by: INTERNAL MEDICINE

## 2024-07-24 PROCEDURE — 63710000001 FAMOTIDINE 20 MG TABLET: Performed by: STUDENT IN AN ORGANIZED HEALTH CARE EDUCATION/TRAINING PROGRAM

## 2024-07-24 PROCEDURE — 22514 PERQ VERTEBRAL AUGMENTATION: CPT | Performed by: NEUROLOGICAL SURGERY

## 2024-07-24 PROCEDURE — 63710000001 DORZOLAMIDE 2 % SOLUTION 10 ML BOTTLE: Performed by: NURSE PRACTITIONER

## 2024-07-24 PROCEDURE — 99222 1ST HOSP IP/OBS MODERATE 55: CPT | Performed by: INTERNAL MEDICINE

## 2024-07-24 PROCEDURE — 63710000001 LATANOPROST 0.005 % SOLUTION 2.5 ML BOTTLE: Performed by: NURSE PRACTITIONER

## 2024-07-24 PROCEDURE — 25010000002 SUGAMMADEX 200 MG/2ML SOLUTION: Performed by: NURSE ANESTHETIST, CERTIFIED REGISTERED

## 2024-07-24 PROCEDURE — 25010000002 PROPOFOL 10 MG/ML EMULSION: Performed by: NURSE ANESTHETIST, CERTIFIED REGISTERED

## 2024-07-24 PROCEDURE — A9270 NON-COVERED ITEM OR SERVICE: HCPCS | Performed by: NEUROLOGICAL SURGERY

## 2024-07-24 PROCEDURE — 93005 ELECTROCARDIOGRAM TRACING: CPT | Performed by: STUDENT IN AN ORGANIZED HEALTH CARE EDUCATION/TRAINING PROGRAM

## 2024-07-24 PROCEDURE — 25010000002 ONDANSETRON PER 1 MG: Performed by: NURSE ANESTHETIST, CERTIFIED REGISTERED

## 2024-07-24 PROCEDURE — G0378 HOSPITAL OBSERVATION PER HR: HCPCS

## 2024-07-24 PROCEDURE — 25010000002 HYDRALAZINE PER 20 MG: Performed by: NURSE ANESTHETIST, CERTIFIED REGISTERED

## 2024-07-24 PROCEDURE — 25010000002 CEFAZOLIN PER 500 MG: Performed by: NEUROLOGICAL SURGERY

## 2024-07-24 PROCEDURE — 93010 ELECTROCARDIOGRAM REPORT: CPT | Performed by: INTERNAL MEDICINE

## 2024-07-24 PROCEDURE — 25010000002 BUPIVACAINE (PF) 0.25 % SOLUTION: Performed by: NEUROLOGICAL SURGERY

## 2024-07-24 PROCEDURE — 25010000002 FENTANYL CITRATE (PF) 50 MCG/ML SOLUTION

## 2024-07-24 PROCEDURE — 25010000002 HYDRALAZINE PER 20 MG: Performed by: NURSE PRACTITIONER

## 2024-07-24 DEVICE — CMT BONE CONFIDENCE/PLS DS KT 11CC: Type: IMPLANTABLE DEVICE | Site: BACK | Status: FUNCTIONAL

## 2024-07-24 RX ORDER — ACETAMINOPHEN 325 MG/1
650 TABLET ORAL EVERY 6 HOURS PRN
Status: DISCONTINUED | OUTPATIENT
Start: 2024-07-24 | End: 2024-07-26 | Stop reason: HOSPADM

## 2024-07-24 RX ORDER — LIDOCAINE HYDROCHLORIDE 10 MG/ML
INJECTION, SOLUTION EPIDURAL; INFILTRATION; INTRACAUDAL; PERINEURAL AS NEEDED
Status: DISCONTINUED | OUTPATIENT
Start: 2024-07-24 | End: 2024-07-24 | Stop reason: SURG

## 2024-07-24 RX ORDER — ONDANSETRON 2 MG/ML
INJECTION INTRAMUSCULAR; INTRAVENOUS AS NEEDED
Status: DISCONTINUED | OUTPATIENT
Start: 2024-07-24 | End: 2024-07-24 | Stop reason: SURG

## 2024-07-24 RX ORDER — POTASSIUM CHLORIDE 750 MG/1
10 TABLET, FILM COATED, EXTENDED RELEASE ORAL 2 TIMES DAILY
Status: ON HOLD | COMMUNITY
End: 2024-07-26

## 2024-07-24 RX ORDER — POLYETHYLENE GLYCOL 3350 17 G/17G
17 POWDER, FOR SOLUTION ORAL DAILY PRN
Status: DISCONTINUED | OUTPATIENT
Start: 2024-07-24 | End: 2024-07-26 | Stop reason: HOSPADM

## 2024-07-24 RX ORDER — FENTANYL CITRATE 50 UG/ML
INJECTION, SOLUTION INTRAMUSCULAR; INTRAVENOUS
Status: COMPLETED
Start: 2024-07-24 | End: 2024-07-24

## 2024-07-24 RX ORDER — IOPAMIDOL 408 MG/ML
INJECTION, SOLUTION INTRATHECAL AS NEEDED
Status: DISCONTINUED | OUTPATIENT
Start: 2024-07-24 | End: 2024-07-24 | Stop reason: HOSPADM

## 2024-07-24 RX ORDER — BISACODYL 10 MG
10 SUPPOSITORY, RECTAL RECTAL DAILY PRN
Status: DISCONTINUED | OUTPATIENT
Start: 2024-07-24 | End: 2024-07-26 | Stop reason: HOSPADM

## 2024-07-24 RX ORDER — DROPERIDOL 2.5 MG/ML
0.62 INJECTION, SOLUTION INTRAMUSCULAR; INTRAVENOUS ONCE AS NEEDED
Status: DISCONTINUED | OUTPATIENT
Start: 2024-07-24 | End: 2024-07-24 | Stop reason: HOSPADM

## 2024-07-24 RX ORDER — ATENOLOL 25 MG/1
25 TABLET ORAL 2 TIMES DAILY
Status: DISCONTINUED | OUTPATIENT
Start: 2024-07-24 | End: 2024-07-26 | Stop reason: HOSPADM

## 2024-07-24 RX ORDER — ONDANSETRON 2 MG/ML
4 INJECTION INTRAMUSCULAR; INTRAVENOUS EVERY 6 HOURS PRN
Status: DISCONTINUED | OUTPATIENT
Start: 2024-07-24 | End: 2024-07-26 | Stop reason: HOSPADM

## 2024-07-24 RX ORDER — HYDROCODONE BITARTRATE AND ACETAMINOPHEN 5; 325 MG/1; MG/1
1 TABLET ORAL EVERY 6 HOURS PRN
COMMUNITY

## 2024-07-24 RX ORDER — FENTANYL CITRATE 50 UG/ML
50 INJECTION, SOLUTION INTRAMUSCULAR; INTRAVENOUS
Status: DISCONTINUED | OUTPATIENT
Start: 2024-07-24 | End: 2024-07-24 | Stop reason: HOSPADM

## 2024-07-24 RX ORDER — LIDOCAINE HYDROCHLORIDE AND EPINEPHRINE 5; 5 MG/ML; UG/ML
INJECTION, SOLUTION INFILTRATION; PERINEURAL AS NEEDED
Status: DISCONTINUED | OUTPATIENT
Start: 2024-07-24 | End: 2024-07-24 | Stop reason: HOSPADM

## 2024-07-24 RX ORDER — MIDAZOLAM HYDROCHLORIDE 1 MG/ML
0.5 INJECTION INTRAMUSCULAR; INTRAVENOUS
Status: DISCONTINUED | OUTPATIENT
Start: 2024-07-24 | End: 2024-07-24 | Stop reason: HOSPADM

## 2024-07-24 RX ORDER — LATANOPROST 50 UG/ML
1 SOLUTION/ DROPS OPHTHALMIC NIGHTLY
Status: DISCONTINUED | OUTPATIENT
Start: 2024-07-24 | End: 2024-07-26 | Stop reason: HOSPADM

## 2024-07-24 RX ORDER — HYDROCODONE BITARTRATE AND ACETAMINOPHEN 5; 325 MG/1; MG/1
1-2 TABLET ORAL EVERY 6 HOURS PRN
Qty: 20 TABLET | Refills: 0 | Status: SHIPPED | OUTPATIENT
Start: 2024-07-24 | End: 2024-07-26

## 2024-07-24 RX ORDER — LIDOCAINE HYDROCHLORIDE 10 MG/ML
0.5 INJECTION, SOLUTION EPIDURAL; INFILTRATION; INTRACAUDAL; PERINEURAL ONCE AS NEEDED
Status: DISCONTINUED | OUTPATIENT
Start: 2024-07-24 | End: 2024-07-24 | Stop reason: HOSPADM

## 2024-07-24 RX ORDER — HYDROMORPHONE HYDROCHLORIDE 1 MG/ML
0.5 INJECTION, SOLUTION INTRAMUSCULAR; INTRAVENOUS; SUBCUTANEOUS
Status: DISCONTINUED | OUTPATIENT
Start: 2024-07-24 | End: 2024-07-24 | Stop reason: HOSPADM

## 2024-07-24 RX ORDER — HYDROCODONE BITARTRATE AND ACETAMINOPHEN 5; 325 MG/1; MG/1
1 TABLET ORAL ONCE AS NEEDED
Status: DISCONTINUED | OUTPATIENT
Start: 2024-07-24 | End: 2024-07-24 | Stop reason: HOSPADM

## 2024-07-24 RX ORDER — NITROGLYCERIN 0.4 MG/1
0.4 TABLET SUBLINGUAL
Status: DISCONTINUED | OUTPATIENT
Start: 2024-07-24 | End: 2024-07-26 | Stop reason: HOSPADM

## 2024-07-24 RX ORDER — HYDRALAZINE HYDROCHLORIDE 20 MG/ML
INJECTION INTRAMUSCULAR; INTRAVENOUS AS NEEDED
Status: DISCONTINUED | OUTPATIENT
Start: 2024-07-24 | End: 2024-07-24 | Stop reason: SURG

## 2024-07-24 RX ORDER — SODIUM CHLORIDE, SODIUM LACTATE, POTASSIUM CHLORIDE, CALCIUM CHLORIDE 600; 310; 30; 20 MG/100ML; MG/100ML; MG/100ML; MG/100ML
9 INJECTION, SOLUTION INTRAVENOUS CONTINUOUS
Status: DISCONTINUED | OUTPATIENT
Start: 2024-07-24 | End: 2024-07-26 | Stop reason: HOSPADM

## 2024-07-24 RX ORDER — AMOXICILLIN 250 MG
2 CAPSULE ORAL 2 TIMES DAILY PRN
Status: DISCONTINUED | OUTPATIENT
Start: 2024-07-24 | End: 2024-07-26 | Stop reason: HOSPADM

## 2024-07-24 RX ORDER — HYDRALAZINE HYDROCHLORIDE 20 MG/ML
10 INJECTION INTRAMUSCULAR; INTRAVENOUS ONCE
Status: COMPLETED | OUTPATIENT
Start: 2024-07-25 | End: 2024-07-24

## 2024-07-24 RX ORDER — PANTOPRAZOLE SODIUM 40 MG/1
40 TABLET, DELAYED RELEASE ORAL DAILY
Status: DISCONTINUED | OUTPATIENT
Start: 2024-07-25 | End: 2024-07-26 | Stop reason: HOSPADM

## 2024-07-24 RX ORDER — DORZOLAMIDE HYDROCHLORIDE AND TIMOLOL MALEATE 20; 5 MG/ML; MG/ML
SOLUTION/ DROPS OPHTHALMIC 2 TIMES DAILY
Status: DISCONTINUED | OUTPATIENT
Start: 2024-07-24 | End: 2024-07-26 | Stop reason: HOSPADM

## 2024-07-24 RX ORDER — BUPIVACAINE HYDROCHLORIDE 2.5 MG/ML
INJECTION, SOLUTION EPIDURAL; INFILTRATION; INTRACAUDAL AS NEEDED
Status: DISCONTINUED | OUTPATIENT
Start: 2024-07-24 | End: 2024-07-24 | Stop reason: HOSPADM

## 2024-07-24 RX ORDER — BISACODYL 5 MG/1
5 TABLET, DELAYED RELEASE ORAL DAILY PRN
Status: DISCONTINUED | OUTPATIENT
Start: 2024-07-24 | End: 2024-07-26 | Stop reason: HOSPADM

## 2024-07-24 RX ORDER — ROCURONIUM BROMIDE 10 MG/ML
INJECTION, SOLUTION INTRAVENOUS AS NEEDED
Status: DISCONTINUED | OUTPATIENT
Start: 2024-07-24 | End: 2024-07-24 | Stop reason: SURG

## 2024-07-24 RX ORDER — PHENYLEPHRINE HCL IN 0.9% NACL 1 MG/10 ML
SYRINGE (ML) INTRAVENOUS AS NEEDED
Status: DISCONTINUED | OUTPATIENT
Start: 2024-07-24 | End: 2024-07-24 | Stop reason: SURG

## 2024-07-24 RX ORDER — PREDNISONE 5 MG/1
5 TABLET ORAL
Status: DISCONTINUED | OUTPATIENT
Start: 2024-07-25 | End: 2024-07-26 | Stop reason: HOSPADM

## 2024-07-24 RX ORDER — SODIUM CHLORIDE 0.9 % (FLUSH) 0.9 %
10 SYRINGE (ML) INJECTION EVERY 12 HOURS SCHEDULED
Status: DISCONTINUED | OUTPATIENT
Start: 2024-07-24 | End: 2024-07-26 | Stop reason: HOSPADM

## 2024-07-24 RX ORDER — ACETAMINOPHEN 325 MG/1
650 TABLET ORAL EVERY 4 HOURS PRN
Status: DISCONTINUED | OUTPATIENT
Start: 2024-07-24 | End: 2024-07-24

## 2024-07-24 RX ORDER — DEXAMETHASONE SODIUM PHOSPHATE 4 MG/ML
INJECTION, SOLUTION INTRA-ARTICULAR; INTRALESIONAL; INTRAMUSCULAR; INTRAVENOUS; SOFT TISSUE AS NEEDED
Status: DISCONTINUED | OUTPATIENT
Start: 2024-07-24 | End: 2024-07-24 | Stop reason: SURG

## 2024-07-24 RX ORDER — FOLIC ACID 1 MG/1
1 TABLET ORAL DAILY
Status: DISCONTINUED | OUTPATIENT
Start: 2024-07-25 | End: 2024-07-26 | Stop reason: HOSPADM

## 2024-07-24 RX ORDER — SODIUM CHLORIDE 9 MG/ML
40 INJECTION, SOLUTION INTRAVENOUS AS NEEDED
Status: DISCONTINUED | OUTPATIENT
Start: 2024-07-24 | End: 2024-07-26 | Stop reason: HOSPADM

## 2024-07-24 RX ORDER — FAMOTIDINE 20 MG/1
20 TABLET, FILM COATED ORAL ONCE
Status: COMPLETED | OUTPATIENT
Start: 2024-07-24 | End: 2024-07-24

## 2024-07-24 RX ORDER — PANTOPRAZOLE SODIUM 20 MG/1
20 TABLET, DELAYED RELEASE ORAL DAILY
COMMUNITY

## 2024-07-24 RX ORDER — SODIUM CHLORIDE 0.9 % (FLUSH) 0.9 %
10 SYRINGE (ML) INJECTION AS NEEDED
Status: DISCONTINUED | OUTPATIENT
Start: 2024-07-24 | End: 2024-07-26 | Stop reason: HOSPADM

## 2024-07-24 RX ORDER — HYDROCODONE BITARTRATE AND ACETAMINOPHEN 5; 325 MG/1; MG/1
1 TABLET ORAL EVERY 6 HOURS PRN
Status: DISCONTINUED | OUTPATIENT
Start: 2024-07-24 | End: 2024-07-26 | Stop reason: HOSPADM

## 2024-07-24 RX ORDER — PROPOFOL 10 MG/ML
VIAL (ML) INTRAVENOUS AS NEEDED
Status: DISCONTINUED | OUTPATIENT
Start: 2024-07-24 | End: 2024-07-24 | Stop reason: SURG

## 2024-07-24 RX ADMIN — LATANOPROST 1 DROP: 50 SOLUTION OPHTHALMIC at 20:55

## 2024-07-24 RX ADMIN — SUGAMMADEX 200 MG: 100 INJECTION, SOLUTION INTRAVENOUS at 14:26

## 2024-07-24 RX ADMIN — Medication 10 ML: at 20:56

## 2024-07-24 RX ADMIN — LIDOCAINE HYDROCHLORIDE 50 MG: 10 SOLUTION INTRAVENOUS at 13:49

## 2024-07-24 RX ADMIN — HYDRALAZINE HYDROCHLORIDE 5 MG: 20 INJECTION INTRAMUSCULAR; INTRAVENOUS at 14:39

## 2024-07-24 RX ADMIN — HYDRALAZINE HYDROCHLORIDE 10 MG: 20 INJECTION INTRAMUSCULAR; INTRAVENOUS at 23:55

## 2024-07-24 RX ADMIN — ROCURONIUM BROMIDE 30 MG: 10 INJECTION INTRAVENOUS at 13:49

## 2024-07-24 RX ADMIN — FENTANYL CITRATE 50 MCG: 50 INJECTION, SOLUTION INTRAMUSCULAR; INTRAVENOUS at 15:40

## 2024-07-24 RX ADMIN — ATENOLOL 25 MG: 25 TABLET ORAL at 20:56

## 2024-07-24 RX ADMIN — Medication 200 MCG: at 14:20

## 2024-07-24 RX ADMIN — TIMOLOL MALEATE: 5 SOLUTION/ DROPS OPHTHALMIC at 20:57

## 2024-07-24 RX ADMIN — FAMOTIDINE 20 MG: 20 TABLET, FILM COATED ORAL at 11:06

## 2024-07-24 RX ADMIN — ONDANSETRON 4 MG: 2 INJECTION INTRAMUSCULAR; INTRAVENOUS at 13:49

## 2024-07-24 RX ADMIN — SODIUM CHLORIDE, POTASSIUM CHLORIDE, SODIUM LACTATE AND CALCIUM CHLORIDE 9 ML/HR: 600; 310; 30; 20 INJECTION, SOLUTION INTRAVENOUS at 11:59

## 2024-07-24 RX ADMIN — Medication 100 MCG: at 14:02

## 2024-07-24 RX ADMIN — SODIUM CHLORIDE 2000 MG: 900 INJECTION INTRAVENOUS at 13:41

## 2024-07-24 RX ADMIN — DEXAMETHASONE SODIUM PHOSPHATE 4 MG: 4 INJECTION INTRA-ARTICULAR; INTRALESIONAL; INTRAMUSCULAR; INTRAVENOUS; SOFT TISSUE at 13:49

## 2024-07-24 RX ADMIN — PROPOFOL 100 MG: 10 INJECTION, EMULSION INTRAVENOUS at 13:49

## 2024-07-24 NOTE — H&P
Pre-Op H&P  Michelle Hoff  [id number]  1935      Chief complaint: Fracture of vertebra      Subjective:  Patient is a 89 y.o.female presents for scheduled surgery by Dr. Horton. She anticipates a KYPHOPLASTY L4 today.  The patient was recently sent to [facility] for rehab, but continued to have pain over the past 3 weeks.  The pain is present all the time, regardless of movement.  Pain meds have helped some to alleviate her pain.  She denies having any other associated symptoms with her present condition.    Review of Systems:  Constitutional-- No fever, chills or sweats. No fatigue.  CV-- No chest pain, palpitation or syncope. +HTN.  Resp-- No SOB, cough, hemoptysis  Skin--No rashes. Wounds on bilateral lower extremities.      Allergies:   Allergies   Allergen Reactions    Augmentin [Amoxicillin-Pot Clavulanate] Unknown - High Severity    Cardizem [Diltiazem Hcl] Other (See Comments)     unknown    Metoprolol Other (See Comments)     unknown    Adhesive Tape Rash     If tape is needed paper tape is better         Home Meds:  Medications Prior to Admission   Medication Sig Dispense Refill Last Dose    acetaminophen (TYLENOL) 325 MG tablet Take 2 tablets by mouth Every 6 (Six) Hours As Needed for Mild Pain.       atenolol (TENORMIN) 25 MG tablet Take 1 tablet by mouth 2 (Two) Times a Day.       calcitonin, salmon, (MIACALCIN) 200 UNIT/ACT nasal spray Use 1 spray daily Alternating Nostrils. 3.7 mL 0     Cholecalciferol 50 MCG (2000 UT) capsule Vitamin D3 50 mcg (2,000 unit) capsule   Take 1 capsule every day by oral route.       dorzolamide-timolol (COSOPT) 2-0.5 % ophthalmic solution Administer 1 drop to both eyes 2 (Two) Times a Day.       estradiol (ESTRACE) 0.1 MG/GM vaginal cream APPLY A PEA SIZED AMOUNT OF ESTRADIOL AROUND URETHRA ONCE AT BEDTIME       folic acid (FOLVITE) 1 MG tablet Take 1 tablet by mouth Daily.       ketoconazole (NIZORAL) 2 % cream APPLY TOPICALLY TO THE AFFECTED AREA EVERY DAY        latanoprost (XALATAN) 0.005 % ophthalmic solution Administer 1 drop to both eyes Every Night.       methotrexate 2.5 MG tablet Take 3 tablets by mouth 1 (One) Time Per Week. q monday       predniSONE (DELTASONE) 2.5 MG tablet Take 2 tablets by mouth Daily.       timolol (TIMOPTIC) 0.25 % ophthalmic solution Administer 1 drop to both eyes 2 (Two) Times a Day.            PMH:   Past Medical History:   Diagnosis Date    Arthritis     Compression fracture of body of thoracic vertebra     Fracture     T9    Glaucoma     Hypertension     Macular degeneration     PMR (polymyalgia rheumatica)     RA (rheumatoid arthritis)     Skin tear of left lower leg without complication      PSH:    Past Surgical History:   Procedure Laterality Date    CATARACT EXTRACTION      COLONOSCOPY  APROX AT AGE 80    KYPHOPLASTY N/A 10/10/2019    Procedure: KYPHOPLASTY  T10, L1;  Surgeon: Fausto Horton MD;  Location: [facility];  Service: Neurosurgery       Immunization History:  Influenza: Yes  Pneumococcal: Yes  Tetanus: Yes  Covid : x5    Social History:   Tobacco:   Social History     Tobacco Use   Smoking Status Former    Current packs/day: 0.00    Types: Cigarettes    Quit date:     Years since quittin.5    Passive exposure: Past   Smokeless Tobacco Never      Alcohol:     Social History     Substance and Sexual Activity   Alcohol Use Yes    Alcohol/week: 1.0 - 2.0 standard drink of alcohol    Types: 1 - 2 Glasses of wine per week         Physical Exam:  BP: 122/84  HR: 55  RR: 16  SPO2: 99% on RA  Temp: 97    General Appearance:    Alert, cooperative, no distress, appears stated age   Head:    Normocephalic, without obvious abnormality, atraumatic   Lungs:     Clear to auscultation bilaterally, respirations unlabored    Heart:   Regular rhythm, S1 and S2 normal. +Bradycardic.    Abdomen:    Soft without tenderness   Extremities:   Extremities normal, atraumatic, no cyanosis or edema   Skin:   Skin color, texture, turgor  normal, no rashes. Wounds on bilateral lower extremities.   Neurologic:   Grossly intact     Results Review:     LABS:  Lab Results   Component Value Date    WBC 10.61 07/09/2024    HGB 14.7 07/09/2024    HCT 45.7 07/09/2024    MCV 95.0 07/09/2024     07/09/2024    NEUTROABS 6.82 07/09/2024    GLUCOSE 88 07/09/2024    BUN 22 07/09/2024    CREATININE 0.75 07/09/2024    EGFRIFNONA 57 (L) 10/09/2019     07/09/2024    K 3.5 07/09/2024     07/09/2024    CO2 26.0 07/09/2024    MG 2.0 07/09/2024    CALCIUM 8.7 07/09/2024    ALBUMIN 3.8 07/08/2024    AST 20 07/08/2024    ALT 17 07/08/2024    BILITOT 0.5 07/08/2024       RADIOLOGY:  Imaging Results (Last 72 Hours)       ** No results found for the last 72 hours. **            I reviewed the patient's new clinical results.    Cancer Staging (if applicable)  Cancer Patient: __ yes _x_no __unknown; If yes, clinical stage T:__ N:__M:__, stage group or __N/A      Impression: Fracture of vertebra due to osteoporosis      Plan: KYPHOPLASTY L4       Kojo Thornton, APRN   7/24/2024   11:39 EDT

## 2024-07-24 NOTE — ANESTHESIA PREPROCEDURE EVALUATION
Anesthesia Evaluation     Patient summary reviewed and Nursing notes reviewed   NPO Solid Status: > 8 hours  NPO Liquid Status: > 2 hours           Airway   Mallampati: I  TM distance: >3 FB  Neck ROM: full  No difficulty expected  Dental    (+) upper dentures and lower dentures    Pulmonary     breath sounds clear to auscultation  Cardiovascular     ECG reviewed  Rhythm: regular  Rate: normal    (+) hypertension      Neuro/Psych  GI/Hepatic/Renal/Endo      Musculoskeletal     Abdominal    Substance History      OB/GYN          Other   arthritis,     ROS/Med Hx Other: Successful intubation technique: direct laryngoscopy  Facilitating devices/methods: intubating stylet and anterior pressure/BURP  Endotracheal tube insertion site: oral  Blade: Katherine  Blade size: 3  ETT size (mm): 6.5  Cormack-Lehane Classification: grade IIa - partial view of glottis                  Anesthesia Plan    ASA 3     general     intravenous induction     Anesthetic plan, risks, benefits, and alternatives have been provided, discussed and informed consent has been obtained with: patient and other.    Plan discussed with CRNA.    CODE STATUS:

## 2024-07-24 NOTE — ANESTHESIA POSTPROCEDURE EVALUATION
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        Post Anesthesia Care and Evaluation    Patient location during evaluation: PACU  Patient participation: complete - patient participated  Level of consciousness: awake  Pain score: 0  Pain management: adequate    Airway patency: patent  Anesthetic complications: No anesthetic complications  PONV Status: none  Cardiovascular status: acceptable and stable  Respiratory status: nasal cannula, unassisted, acceptable and spontaneous ventilation  Hydration status: acceptable

## 2024-07-24 NOTE — H&P
[facility] Medicine Services  HISTORY AND PHYSICAL    Patient Name: Michelle Hoff  : 1935  MRN: [id number]  Primary Care Physician: Alma Casanova MD  Date of admission: 2024      Subjective   Subjective     Chief Complaint:  Back pain    HPI:  Michelle Hoff is a 89 y.o. female with history of RA, PMR, macular degeneration, HTN, here with back pain and found to have L4 compression fracture     L4 compression fracture, per MRI with acute findings  -The patient was participated with PT/OT. She was evaluated by NSG. Initially the patient was being evaluated for kyphoplasty, but the patient's pain improved, and NSG initially recommended PT/rehab with office followup.   She was just discharged from [facility] a few days ago but not able to participate much do to back pain.  She saw Dr Horton in office who recommended surgical intervention  She is being admitted to medicine service after OR.      Personal History     Past Medical History:   Diagnosis Date    Arthritis     Compression fracture of body of thoracic vertebra     Fracture     T9    Glaucoma     Hypertension     Macular degeneration     PMR (polymyalgia rheumatica)     RA (rheumatoid arthritis)     Skin tear of left lower leg without complication            Past Surgical History:   Procedure Laterality Date    CATARACT EXTRACTION      COLONOSCOPY  APROX AT AGE 80    KYPHOPLASTY N/A 10/10/2019    Procedure: KYPHOPLASTY  T10, L1;  Surgeon: Fausto Horton MD;  Location: [facility];  Service: Neurosurgery       Family History: family history includes Heart disease in her father; Uterine cancer in her mother.     Social History:  reports that she quit smoking about 32 years ago. Her smoking use included cigarettes. She has been exposed to tobacco smoke. She has never used smokeless tobacco. She reports current alcohol use of about 1.0 - 2.0 standard drink of alcohol per week. She reports that she does not use  drugs.  Social History     Social History Narrative    Not on file       Medications:  Available home medication information reviewed.  Cholecalciferol, HYDROcodone-acetaminophen, acetaminophen, atenolol, calcitonin (salmon), dorzolamide-timolol, estradiol, folic acid, ketoconazole, latanoprost, methotrexate, pantoprazole, potassium chloride, predniSONE, and timolol    Allergies   Allergen Reactions    Augmentin [Amoxicillin-Pot Clavulanate] Diarrhea and Unknown - High Severity    Cardizem [Diltiazem Hcl] Other (See Comments)     unknown    Metoprolol Other (See Comments)     unknown    Adhesive Tape Rash     If tape is needed paper tape is better       Objective   Objective     Vital Signs:   Temp:  [97 °F (36.1 °C)-98.6 °F (37 °C)] 98.6 °F (37 °C)  Heart Rate:  [56-70] 70  Resp:  [16] 16  BP: (122-188)/(58-87) 130/58  Flow (L/min):  [2] 2       Physical Exam  Constitutional:       General: She is not in acute distress.  HENT:      Head: Normocephalic and atraumatic.      Mouth/Throat:      Mouth: Mucous membranes are dry.   Eyes:      Pupils: Pupils are equal, round, and reactive to light.   Cardiovascular:      Rate and Rhythm: Normal rate.      Heart sounds: No murmur heard.  Pulmonary:      Effort: Pulmonary effort is normal. No respiratory distress.      Breath sounds: Normal breath sounds.   Abdominal:      General: There is no distension.      Palpations: Abdomen is soft.   Musculoskeletal:         General: No swelling.   Skin:     General: Skin is warm and dry.      Findings: Bruising present.   Neurological:      Mental Status: She is alert and oriented to person, place, and time.            Result Review:  I have personally reviewed the results from the time of this admission to 7/24/2024 15:48 EDT and agree with these findings:  [x]  Laboratory list / accordion  []  Microbiology  [x]  Radiology  []  EKG/Telemetry   []  Cardiology/Vascular   []  Pathology  [x]  Old records  []  Other:  Most notable  findings include:       LAB RESULTS:                              UA          2/14/2024    21:53 6/25/2024    19:34 7/8/2024    18:14   Urinalysis   Squamous Epithelial Cells, UA   0-2    Specific Gravity, UA 1.015  1.020  1.018    Ketones, UA Negative  Trace  Trace    Blood, UA Negative  Negative  Negative    Leukocytes, UA Negative  Negative  Trace    Nitrite, UA Negative  Negative  Negative    RBC, UA   0-2    WBC, UA   0-2    Bacteria, UA   None Seen        Microbiology Results (last 10 days)       ** No results found for the last 240 hours. 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        Back pain secondary to L4 compression fx  -initially went to [facility] for rehab but pain worsened  -S/p L4 kyphoplasty on 7/24 with Dr Horton  -check labs in AM  -pain control  -post-op per NSGY    Afib  -not on anticoagulation  -On atenolol at home, start in AM    RA  PMR   -on MTX/steroids  -continue folate    Debility  -she does not want to go back to rehab  -would like to go home  -may need to have outpt hospice see her tomorrow..patient requesting to go home as she states she has lived a good life        VTE Prophylaxis:  Mechanical VTE prophylaxis orders are present.          CODE STATUS:    Code Status and Medical Interventions: No CPR (Do Not Attempt to Resuscitate); Limited Support; No intubation (DNI)   Ordered at: 07/24/24 1544     Medical Intervention Limits:    No intubation (DNI)     Code Status (Patient has no pulse and is not breathing):    No CPR (Do Not Attempt to Resuscitate)     Medical Interventions (Patient has pulse or is breathing):    Limited Support       Expected Discharge   Expected Discharge Date: 7/26/2024; Expected Discharge Time:      Mary Garcia,   07/24/24

## 2024-07-24 NOTE — LETTER
EMS Transport Request  For use at [facility], [place], [place], [place], and [place] only   Patient Name: Michelle Hoff : 1935   Weight:61.2 kg (135 lb) Pick-up Location: [facility] BLS/ALS: bls   Insurance: MEDICARE Auth End Date:    Pre-Cert #: D/C Summary complete:    Destination: [place]   Contact Precautions:    Equipment (O2, Fluids, etc.): 2L NC   Arrive By Date/Time:  Stretcher/WC: stretcher   CM Requesting: Gabby [place] RN Ext:[phone]   Notes/Medical Necessity: spinal fx     ______________________________________________________________________    *Only 2 patient bags OR 1 carry-on size bag are permitted.  Wheelchairs and walkers CANNOT transported with the patient. Acknowledge: yes      
Statement Selected

## 2024-07-24 NOTE — OP NOTE
Preoperative diagnosis  L4 pathologic osteoporotic compression fracture intractable pain mobility issues      Postoperative diagnosis is same      Procedures performed  1.  L4 L4 kyphoplasty  2.  T6 attempted transpedicular biopsy for pathologic compression fracture (blood only)    Fausto Horton MD    Assistant BRANDAN Stephens insert PA    5 cc blood loss  Gen. endotracheal anesthesia    No immediate complications    Procedure in detail    After formal written consent was obtained she was taken to the operating room prepped and draped in the usual sterile manner after this point in time.  Local infiltration with Marcaine was performed lidocaine and epinephrine as well using biplanar stereotactic fluoroscopy Avnerashidi needles were docked on the L4 pedicle installation of cement commenced an excellent fill of the L4 fracture line was noted.  This was performed after L4 vertebral body augmentation with inflation of balloons  at 150 PSI.  At this point in time the needles were removed and the incisions were closed with Steri-Strips.    Incidental note was made of a left forearm skin tear which was treated with Steri-Strips and bacitracin and Coban

## 2024-07-24 NOTE — ANESTHESIA PROCEDURE NOTES
Airway  Urgency: elective    Date/Time: 7/24/2024 1:52 PM  Airway not difficult    General Information and Staff    Patient location during procedure: OR  CRNA/CAA: Jose L Desai CRNA    Indications and Patient Condition  Indications for airway management: airway protection    Preoxygenated: yes  MILS not maintained throughout  Mask difficulty assessment: 2 - vent by mask + OA or adjuvant +/- NMBA    Final Airway Details  Final airway type: endotracheal airway      Successful airway: ETT  Cuffed: yes   Successful intubation technique: video laryngoscopy  Facilitating devices/methods: intubating stylet  Endotracheal tube insertion site: oral  Blade: Irene  Blade size: 3  ETT size (mm): 7.0  Cormack-Lehane Classification: grade I - full view of glottis  Placement verified by: chest auscultation and capnometry   Measured from: lips  ETT/EBT  to lips (cm): 20  Number of attempts at approach: 1  Assessment: lips, teeth, and gum same as pre-op and atraumatic intubation    Additional Comments  Negative epigastric sounds, Breath sound equal bilaterally with symmetric chest rise and fall

## 2024-07-25 LAB
ANION GAP SERPL CALCULATED.3IONS-SCNC: 14 MMOL/L (ref 5–15)
BASOPHILS # BLD AUTO: 0.01 10*3/MM3 (ref 0–0.2)
BASOPHILS NFR BLD AUTO: 0.1 % (ref 0–1.5)
BUN SERPL-MCNC: 20 MG/DL (ref 8–23)
BUN/CREAT SERPL: 22.5 (ref 7–25)
CALCIUM SPEC-SCNC: 9.4 MG/DL (ref 8.6–10.5)
CHLORIDE SERPL-SCNC: 100 MMOL/L (ref 98–107)
CO2 SERPL-SCNC: 23 MMOL/L (ref 22–29)
CREAT SERPL-MCNC: 0.89 MG/DL (ref 0.57–1)
DEPRECATED RDW RBC AUTO: 50.6 FL (ref 37–54)
EGFRCR SERPLBLD CKD-EPI 2021: 62.1 ML/MIN/1.73
EOSINOPHIL # BLD AUTO: 0 10*3/MM3 (ref 0–0.4)
EOSINOPHIL NFR BLD AUTO: 0 % (ref 0.3–6.2)
ERYTHROCYTE [DISTWIDTH] IN BLOOD BY AUTOMATED COUNT: 15 % (ref 12.3–15.4)
GLUCOSE SERPL-MCNC: 105 MG/DL (ref 65–99)
HCT VFR BLD AUTO: 42.3 % (ref 34–46.6)
HGB BLD-MCNC: 13.7 G/DL (ref 12–15.9)
IMM GRANULOCYTES # BLD AUTO: 0.08 10*3/MM3 (ref 0–0.05)
IMM GRANULOCYTES NFR BLD AUTO: 0.7 % (ref 0–0.5)
LYMPHOCYTES # BLD AUTO: 1.07 10*3/MM3 (ref 0.7–3.1)
LYMPHOCYTES NFR BLD AUTO: 9.8 % (ref 19.6–45.3)
MAGNESIUM SERPL-MCNC: 2.3 MG/DL (ref 1.6–2.4)
MCH RBC QN AUTO: 30 PG (ref 26.6–33)
MCHC RBC AUTO-ENTMCNC: 32.4 G/DL (ref 31.5–35.7)
MCV RBC AUTO: 92.6 FL (ref 79–97)
MONOCYTES # BLD AUTO: 1.13 10*3/MM3 (ref 0.1–0.9)
MONOCYTES NFR BLD AUTO: 10.3 % (ref 5–12)
NEUTROPHILS NFR BLD AUTO: 79.1 % (ref 42.7–76)
NEUTROPHILS NFR BLD AUTO: 8.66 10*3/MM3 (ref 1.7–7)
NRBC BLD AUTO-RTO: 0 /100 WBC (ref 0–0.2)
PHOSPHATE SERPL-MCNC: 3.7 MG/DL (ref 2.5–4.5)
PLATELET # BLD AUTO: 252 10*3/MM3 (ref 140–450)
PMV BLD AUTO: 11.3 FL (ref 6–12)
POTASSIUM SERPL-SCNC: 4.8 MMOL/L (ref 3.5–5.2)
RBC # BLD AUTO: 4.57 10*6/MM3 (ref 3.77–5.28)
SODIUM SERPL-SCNC: 137 MMOL/L (ref 136–145)
WBC NRBC COR # BLD AUTO: 10.95 10*3/MM3 (ref 3.4–10.8)

## 2024-07-25 PROCEDURE — 85025 COMPLETE CBC W/AUTO DIFF WBC: CPT | Performed by: INTERNAL MEDICINE

## 2024-07-25 PROCEDURE — 97162 PT EVAL MOD COMPLEX 30 MIN: CPT

## 2024-07-25 PROCEDURE — 80048 BASIC METABOLIC PNL TOTAL CA: CPT | Performed by: INTERNAL MEDICINE

## 2024-07-25 PROCEDURE — 63710000001 FOLIC ACID 1 MG TABLET: Performed by: INTERNAL MEDICINE

## 2024-07-25 PROCEDURE — A9270 NON-COVERED ITEM OR SERVICE: HCPCS | Performed by: INTERNAL MEDICINE

## 2024-07-25 PROCEDURE — 84100 ASSAY OF PHOSPHORUS: CPT | Performed by: INTERNAL MEDICINE

## 2024-07-25 PROCEDURE — A9270 NON-COVERED ITEM OR SERVICE: HCPCS | Performed by: NURSE PRACTITIONER

## 2024-07-25 PROCEDURE — 63710000001 ACETAMINOPHEN 325 MG TABLET: Performed by: NURSE PRACTITIONER

## 2024-07-25 PROCEDURE — 63710000001 PREDNISONE PER 5 MG: Performed by: INTERNAL MEDICINE

## 2024-07-25 PROCEDURE — 99232 SBSQ HOSP IP/OBS MODERATE 35: CPT | Performed by: INTERNAL MEDICINE

## 2024-07-25 PROCEDURE — 63710000001 HYDROCODONE-ACETAMINOPHEN 5-325 MG TABLET: Performed by: INTERNAL MEDICINE

## 2024-07-25 PROCEDURE — 63710000001 PANTOPRAZOLE 40 MG TABLET DELAYED-RELEASE: Performed by: NURSE PRACTITIONER

## 2024-07-25 PROCEDURE — 63710000001 ATENOLOL 25 MG TABLET: Performed by: INTERNAL MEDICINE

## 2024-07-25 PROCEDURE — 83735 ASSAY OF MAGNESIUM: CPT | Performed by: INTERNAL MEDICINE

## 2024-07-25 RX ADMIN — TIMOLOL MALEATE: 5 SOLUTION/ DROPS OPHTHALMIC at 08:52

## 2024-07-25 RX ADMIN — ATENOLOL 25 MG: 25 TABLET ORAL at 19:47

## 2024-07-25 RX ADMIN — Medication 10 ML: at 22:44

## 2024-07-25 RX ADMIN — PANTOPRAZOLE SODIUM 40 MG: 40 TABLET, DELAYED RELEASE ORAL at 08:51

## 2024-07-25 RX ADMIN — ATENOLOL 25 MG: 25 TABLET ORAL at 08:52

## 2024-07-25 RX ADMIN — ACETAMINOPHEN 650 MG: 325 TABLET ORAL at 21:58

## 2024-07-25 RX ADMIN — Medication 10 ML: at 09:01

## 2024-07-25 RX ADMIN — TIMOLOL MALEATE: 5 SOLUTION/ DROPS OPHTHALMIC at 19:49

## 2024-07-25 RX ADMIN — PREDNISONE 5 MG: 5 TABLET ORAL at 08:52

## 2024-07-25 RX ADMIN — HYDROCODONE BITARTRATE AND ACETAMINOPHEN 1 TABLET: 5; 325 TABLET ORAL at 19:15

## 2024-07-25 RX ADMIN — LATANOPROST 1 DROP: 50 SOLUTION OPHTHALMIC at 19:49

## 2024-07-25 RX ADMIN — FOLIC ACID 1 MG: 1 TABLET ORAL at 08:52

## 2024-07-25 RX ADMIN — ACETAMINOPHEN 650 MG: 325 TABLET ORAL at 15:05

## 2024-07-25 NOTE — THERAPY EVALUATION
Patient Name: Michelle Hoff  : 1935    MRN: [id number]                              Today's Date: 2024       Admit Date: 2024    Visit Dx:     ICD-10-CM ICD-9-CM   1. Fracture of vertebra due to osteoporosis with delayed healing, subsequent encounter  M80.08XG V54.27   2. 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    Fracture of vertebra due to osteoporosis    Lumbar compression fracture     Past Medical History:   Diagnosis Date    Arthritis     Compression fracture of body of thoracic vertebra     Fracture     T9    Glaucoma     Hypertension     Macular degeneration     PMR (polymyalgia rheumatica)     RA (rheumatoid arthritis)     Skin tear of left lower leg without complication      Past Surgical History:   Procedure Laterality Date    CATARACT EXTRACTION      COLONOSCOPY  APROX AT AGE 80    KYPHOPLASTY N/A 10/10/2019    Procedure: KYPHOPLASTY  T10, L1;  Surgeon: Fausto Horton MD;  Location: [facility];  Service: Neurosurgery      General Information       Row Name 24 1032          Physical Therapy Time and Intention    Document Type evaluation  -SS     Mode of Treatment physical therapy  -SS       Row Name 24 1032          General Information    Patient Profile Reviewed yes  -SS     Prior Level of Function min assist:;all household mobility;gait;transfer;bed mobility;dependent:;w/c or scooter  pt. reports use of rollator for household ambulation, has transport chair and  hospital bed, at least 3 acute falls  -     Existing Precautions/Restrictions fall;spinal;oxygen therapy device and L/min;other (see comments)  L4 kyphoplasty, low vision  -     Barriers to Rehab medically complex;previous functional deficit  -       Row Name 07/25/24 1032          Living Environment    People in Home alone;other (see comments)  24/7 caregivers  -       Row Name 07/25/24 1032          Home Main Entrance    Number of Stairs, Main Entrance three  -SS     Stair Railings, Main Entrance railing on right side (ascending)  -       Row Name 07/25/24 1032          Stairs Within Home, Primary    Number of Stairs, Within Home, Primary none  -       Row Name 07/25/24 1032          Cognition    Orientation Status (Cognition) oriented x 3;verbal cues/prompts needed for orientation  -       Row Name 07/25/24 1032          Safety Issues, Functional Mobility    Safety Issues Affecting Function (Mobility) awareness of need for assistance;insight into deficits/self-awareness;judgment;positioning of assistive device;problem-solving;safety precaution awareness;safety precautions follow-through/compliance;sequencing abilities  -     Impairments Affecting Function (Mobility) balance;endurance/activity tolerance;pain;postural/trunk control;range of motion (ROM);strength  -               User Key  (r) = Recorded By, (t) = Taken By, (c) = Cosigned By      Initials Name Provider Type     Silvia Nagy PT Physical Therapist                   Mobility       Row Name 07/25/24 1035          Bed Mobility    Bed Mobility rolling right;sidelying-sit  -     Rolling Right [place] (Bed Mobility) minimum assist (75% patient effort);verbal cues  -     Sidelying-Sit [place] (Bed Mobility) minimum assist (75% patient effort);verbal cues  -     Assistive Device (Bed Mobility) bed rails;head of bed elevated  -     Comment, (Bed Mobility) VC for log roll sequencing  -       Row Name 07/25/24 1031           Sit-Stand Transfer    Sit-Stand [place] (Transfers) minimum assist (75% patient effort);2 person assist;verbal cues  -     Assistive Device (Sit-Stand Transfers) walker, front-wheeled  -     Comment, (Sit-Stand Transfer) VC for hand placement, appropriate alignment, lowering with eccentric control  -       Row Name 07/25/24 1035          Gait/Stairs (Locomotion)    [place] Level (Gait) moderate assist (50% patient effort);2 person assist;verbal cues  -     Assistive Device (Gait) walker, front-wheeled  -SS     Patient was able to Ambulate yes  -SS     Distance in Feet (Gait) 3  -SS     Deviations/Abnormal Patterns (Gait) bilateral deviations;base of support, wide;malgorzata decreased;gait speed decreased;stride length decreased;weight shifting decreased;festinating/shuffling  -     Bilateral Gait Deviations forward flexed posture;heel strike decreased  -     Comment, (Gait/Stairs) Pt. ambulated with a shuffling gait pattern at a decreased speed. VC for AD management, upright posture. Activity limited by fatigue, pain.  -               User Key  (r) = Recorded By, (t) = Taken By, (c) = Cosigned By      Initials Name Provider Type     Silvia Nagy, PT Physical Therapist                   Obj/Interventions       Row Name 07/25/24 1037          Range of Motion Comprehensive    General Range of Motion bilateral lower extremity ROM WFL  -       Row Name 07/25/24 1037          Strength Comprehensive (MMT)    Comment, General Manual Muscle Testing (MMT) Assessment BLE gross 3/5 per SLR test  -       Row Name 07/25/24 1037          Balance    Balance Assessment sitting static balance;sitting dynamic balance;sit to stand dynamic balance;standing static balance;standing dynamic balance  -     Static Sitting Balance standby assist  -     Dynamic Sitting Balance minimal assist  -     Position, Sitting Balance unsupported;sitting edge of bed  -     Sit to Stand Dynamic Balance minimal  assist;2-person assist  -SS     Static Standing Balance minimal assist;2-person assist  -SS     Dynamic Standing Balance moderate assist;2-person assist  -SS     Position/Device Used, Standing Balance supported;walker, front-wheeled  -SS     Balance Interventions sitting;standing;sit to stand;supported;static;dynamic  -SS       Row Name 07/25/24 1037          Sensory Assessment (Somatosensory)    Sensory Assessment (Somatosensory) LE sensation intact  -SS               User Key  (r) = Recorded By, (t) = Taken By, (c) = Cosigned By      Initials Name Provider Type    SS Silvia Nagy, PT Physical Therapist                   Goals/Plan       Row Name 07/25/24 1045          Bed Mobility Goal 1 (PT)    Activity/Assistive Device (Bed Mobility Goal 1, PT) bed mobility activities, all  -SS     [place] Level/Cues Needed (Bed Mobility Goal 1, PT) modified independence  -SS     Time Frame (Bed Mobility Goal 1, PT) short term goal (STG);5 days  -SS     Strategies/Barriers (Bed Mobility Goal 1, PT) log roll technique  -       Row Name 07/25/24 1045          Transfer Goal 1 (PT)    Activity/Assistive Device (Transfer Goal 1, PT) sit-to-stand/stand-to-sit;bed-to-chair/chair-to-bed;walker, rolling  -SS     [place] Level/Cues Needed (Transfer Goal 1, PT) modified independence  -SS     Time Frame (Transfer Goal 1, PT) long term goal (LTG);10 days  -       Row Name 07/25/24 1045          Gait Training Goal 1 (PT)    Activity/Assistive Device (Gait Training Goal 1, PT) gait (walking locomotion);assistive device use;walker, rolling  -SS     [place] Level (Gait Training Goal 1, PT) modified independence  -SS     Distance (Gait Training Goal 1, PT) 100  -SS     Time Frame (Gait Training Goal 1, PT) long term goal (LTG);10 days  -       Row Name 07/25/24 1045          Stairs Goal 1 (PT)    Activity/Assistive Device (Stairs Goal 1, PT) ascending stairs;using handrail, right;descending stairs;using handrail, left  -SS      [place] Level/Cues Needed (Stairs Goal 1, PT) modified independence  -SS     Number of Stairs (Stairs Goal 1, PT) 3  -SS     Time Frame (Stairs Goal 1, PT) long term goal (LTG);10 days  -       Row Name 07/25/24 1045          Therapy Assessment/Plan (PT)    Planned Therapy Interventions (PT) balance training;bed mobility training;gait training;home exercise program;neuromuscular re-education;patient/family education;postural re-education;lumbar stabilization;ROM (range of motion);stair training;strengthening;stretching;transfer training  -               User Key  (r) = Recorded By, (t) = Taken By, (c) = Cosigned By      Initials Name Provider Type    SS Silvia Nagy, PT Physical Therapist                   Clinical Impression       Row Name 07/25/24 1041          Pain    Pretreatment Pain Rating 0/10 - no pain  -     Posttreatment Pain Rating 4/10  -SS     Pain Location - Side/Orientation Bilateral  -SS     Pain Location incisional  -     Pain Location - back  -     Pain Intervention(s) Repositioned;Ambulation/increased activity;Elevated  -     Additional Documentation Pain Scale: Numbers Pre/Post-Treatment (Group)  -       Row Name 07/25/24 1041          Plan of Care Review    Plan of Care Reviewed With patient  -SS     Outcome Evaluation Pt. presents below baseline function w/generalized weakness, balance deficits, acute spinal precautions and acute pain affecting her ability to safely participate in functional mobility. She performed bed mobility, transfers and ambulated 3' w/front wheeled walker, min-mod assist of 2. Activity limited by pain. Pt. educated spinal precautions and log roll technique. Pt. would benefit from acute PT services to address stated deficits.  -       Row Name 07/25/24 1041          Therapy Assessment/Plan (PT)    Patient/Family Therapy Goals Statement (PT) to go home  -     Rehab Potential (PT) good, to achieve stated therapy goals  -     Criteria for Skilled  Interventions Met (PT) yes;meets criteria;skilled treatment is necessary  -SS     Therapy Frequency (PT) daily  -SS     Predicted Duration of Therapy Intervention (PT) 10 days  -SS       Row Name 07/25/24 1041          Vital Signs    Pre Systolic BP Rehab 162  -SS     Pre Treatment Diastolic BP 60  -SS     Pretreatment Heart Rate (beats/min) 78  -SS     Intratreatment Heart Rate (beats/min) 133  -SS     Posttreatment Heart Rate (beats/min) 130  notified RN  -SS     Pre SpO2 (%) 93  -SS     O2 Delivery Pre Treatment nasal cannula  2L  -SS     Intra SpO2 (%) 84  -SS     O2 Delivery Intra Treatment room air  -SS     Post SpO2 (%) 90  -SS     O2 Delivery Post Treatment nasal cannula  2L  -SS     Pre Patient Position Supine  -SS     Intra Patient Position Standing  -SS     Post Patient Position Sitting  -SS       Row Name 07/25/24 1041          Positioning and Restraints    Pre-Treatment Position in bed  -SS     Post Treatment Position chair  -SS     In Chair notified nsg;reclined;call light within reach;encouraged to call for assist;exit alarm on;with family/caregiver;waffle cushion;legs elevated  -               User Key  (r) = Recorded By, (t) = Taken By, (c) = Cosigned By      Initials Name Provider Type    SS Silvia Nagy, PT Physical Therapist                   Outcome Measures       Row Name 07/25/24 1046          How much help from another person do you currently need...    Turning from your back to your side while in flat bed without using bedrails? 3  -SS     Moving from lying on back to sitting on the side of a flat bed without bedrails? 3  -SS     Moving to and from a bed to a chair (including a wheelchair)? 2  -SS     Standing up from a chair using your arms (e.g., wheelchair, bedside chair)? 2  -SS     Climbing 3-5 steps with a railing? 1  -SS     To walk in hospital room? 2  -SS     AM-PAC 6 Clicks Score (PT) 13  -SS     Highest Level of Mobility Goal 4 --> Transfer to chair/commode  -SS       Row Name  07/25/24 1046          Functional Assessment    Outcome Measure Options AM-PAC 6 Clicks Basic Mobility (PT)  -SS               User Key  (r) = Recorded By, (t) = Taken By, (c) = Cosigned By      Initials Name Provider Type    Silvia Vergara PT Physical Therapist                                 Physical Therapy Education       Title: PT OT SLP Therapies (In Progress)       Topic: Physical Therapy (Done)       Point: Mobility training (Done)       Learning Progress Summary             Patient Eager, E,H, DU,NR,VU by  at 7/25/2024 1046    Comment: Educated pt. safety/technique w/bed mobility (log roll), transfers, ambulation, PT POC, spinal precautions   Family Eager, E,H, DU,NR,VU by  at 7/25/2024 1046    Comment: Educated pt. safety/technique w/bed mobility (log roll), transfers, ambulation, PT POC, spinal precautions   Caregiver Eager, E,H, DU,NR,VU by  at 7/25/2024 1046    Comment: Educated pt. safety/technique w/bed mobility (log roll), transfers, ambulation, PT POC, spinal precautions                         Point: Home exercise program (Done)       Learning Progress Summary             Patient Eager, E,H, DU,NR,VU by  at 7/25/2024 1046    Comment: Educated pt. safety/technique w/bed mobility (log roll), transfers, ambulation, PT POC, spinal precautions   Family Eager, E,H, DU,NR,VU by  at 7/25/2024 1046    Comment: Educated pt. safety/technique w/bed mobility (log roll), transfers, ambulation, PT POC, spinal precautions   Caregiver Eager, E,H, DU,NR,VU by  at 7/25/2024 1046    Comment: Educated pt. safety/technique w/bed mobility (log roll), transfers, ambulation, PT POC, spinal precautions                         Point: Body mechanics (Done)       Learning Progress Summary             Patient Eager, E,H, DU,NR,VU by  at 7/25/2024 1046    Comment: Educated pt. safety/technique w/bed mobility (log roll), transfers, ambulation, PT POC, spinal precautions   Family Eager, E,H, DU,NR,VU by  at  7/25/2024 1046    Comment: Educated pt. safety/technique w/bed mobility (log roll), transfers, ambulation, PT POC, spinal precautions   Caregiver Eager, E,H, DU,NR,VU by  at 7/25/2024 1046    Comment: Educated pt. safety/technique w/bed mobility (log roll), transfers, ambulation, PT POC, spinal precautions                         Point: Precautions (Done)       Learning Progress Summary             Patient Eager, E,H, DU,NR,VU by  at 7/25/2024 1046    Comment: Educated pt. safety/technique w/bed mobility (log roll), transfers, ambulation, PT POC, spinal precautions   Family Eager, E,H, DU,NR,VU by  at 7/25/2024 1046    Comment: Educated pt. safety/technique w/bed mobility (log roll), transfers, ambulation, PT POC, spinal precautions   Caregiver Eager, E,H, DU,NR,VU by  at 7/25/2024 1046    Comment: Educated pt. safety/technique w/bed mobility (log roll), transfers, ambulation, PT POC, spinal precautions                                         User Key       Initials Effective Dates Name Provider Type Discipline     06/01/21 -  Silvia Nagy, PT Physical Therapist PT                  PT Recommendation and Plan  Planned Therapy Interventions (PT): balance training, bed mobility training, gait training, home exercise program, neuromuscular re-education, patient/family education, postural re-education, lumbar stabilization, ROM (range of motion), stair training, strengthening, stretching, transfer training  Plan of Care Reviewed With: patient  Outcome Evaluation: Pt. presents below baseline function w/generalized weakness, balance deficits, acute spinal precautions and acute pain affecting her ability to safely participate in functional mobility. She performed bed mobility, transfers and ambulated 3' w/front wheeled walker, min-mod assist of 2. Activity limited by pain. Pt. educated spinal precautions and log roll technique. Pt. would benefit from acute PT services to address stated deficits.     Time  Calculation:   PT Evaluation Complexity  History, PT Evaluation Complexity: 3 or more personal factors and/or comorbidities  Examination of Body Systems (PT Eval Complexity): total of 4 or more elements  Clinical Presentation (PT Evaluation Complexity): evolving  Clinical Decision Making (PT Evaluation Complexity): moderate complexity  Overall Complexity (PT Evaluation Complexity): moderate complexity     PT Charges       Row Name 07/25/24 1047             Time Calculation    Start Time 0958  -SS      PT Received On 07/25/24  -SS      PT Goal Re-Cert Due Date 08/04/24  -SS         Untimed Charges    PT Eval/Re-eval Minutes 51  -SS         Total Minutes    Untimed Charges Total Minutes 51  -SS       Total Minutes 51  -SS                User Key  (r) = Recorded By, (t) = Taken By, (c) = Cosigned By      Initials Name Provider Type     Silvia Nagy, PT Physical Therapist                  Therapy Charges for Today       Code Description Service Date Service Provider Modifiers Qty    03975809563 HC PT EVAL MOD COMPLEXITY 4 7/25/2024 Silvia Nagy, PT GP 1    28260069958 HC PT THER SUPP EA 15 MIN 7/25/2024 Silvia Nagy, PT GP 2            PT G-Codes  Outcome Measure Options: AM-PAC 6 Clicks Basic Mobility (PT)  AM-PAC 6 Clicks Score (PT): 13  PT Discharge Summary  Anticipated Discharge Disposition (PT): skilled nursing facility    Silvia Nagy PT  7/25/2024

## 2024-07-25 NOTE — PLAN OF CARE
Goal Outcome Evaluation:  Plan of Care Reviewed With: patient        Progress: no change       Pt slept on/off throughout the night. C/o Minimal pain with activity but declined pain meds. Hydralazine IV for elevated BP.

## 2024-07-25 NOTE — CASE MANAGEMENT/SOCIAL WORK
Case Management Discharge Note      Final Note: Pt has been approved to go to [place] for skilled rehab with transport at 0915 via [facility]. Report can be called to [phone]. Family and pt in agreement with plan         Selected Continued Care - Admitted Since 7/24/2024       Destination Coordination complete.      Service Provider Selected Services Address Phone Fax Patient Preferred    [facility] [address] [facility] [address] [phone] --              Durable Medical Equipment    No services have been selected for the patient.                Dialysis/Infusion    No services have been selected for the patient.                Home Medical Care    No services have been selected for the patient.                Therapy    No services have been selected for the patient.                Community Resources    No services have been selected for the patient.                Community & DME    No services have been selected for the patient.                    Selected Continued Care - Prior Encounters Includes continued care and service providers with selected services from prior encounters from 4/25/2024 to 7/25/2024      Discharged on 7/11/2024 Admission date: 7/8/2024 - Discharge disposition: Rehab Facility or Unit (DC - External)      Destination       Service Provider Selected Services Address Phone Fax Patient Preferred    [facility] [address] [facility] [address] [phone] --                               Final Discharge Disposition Code: 03 - skilled nursing facility (SNF)

## 2024-07-25 NOTE — PROGRESS NOTES
"  [facility]    NEUROSURGERY PROGRESS NOTE    07/25/24    Chief Complaint: Back pain    Subjective: Stable overnight.  Patient reports her back pain is improved since kyphoplasty    1 Day Post-Op    Objective:     /60   Pulse 64   Temp 98.3 °F (36.8 °C) (Oral)   Resp 16   Ht 157.5 cm (62\")   Wt 61.2 kg (135 lb)   SpO2 93%   BMI 24.69 kg/m²        Physical Exam  Patient appears comfortable, sitting in chair  Speech f/c  Opens eyes spontaneously  EOM intact bilaterally  Pupils 3mm reactive bilaterally  Moves bilateral lower extremities symmetrically        Intake/Output Summary (Last 24 hours) at 7/25/2024 1123  Last data filed at 7/24/2024 2300  Gross per 24 hour   Intake 1050 ml   Output 400 ml   Net 650 ml         Current Facility-Administered Medications:     acetaminophen (TYLENOL) tablet 650 mg, 650 mg, Oral, Q6H PRN, Paulette Carcamo, APRN    atenolol (TENORMIN) tablet 25 mg, 25 mg, Oral, BID, Mary Garcia DO, 25 mg at 07/25/24 0852    sennosides-docusate (PERICOLACE) 8.6-50 MG per tablet 2 tablet, 2 tablet, Oral, BID PRN **AND** polyethylene glycol (MIRALAX) packet 17 g, 17 g, Oral, Daily PRN **AND** bisacodyl (DULCOLAX) EC tablet 5 mg, 5 mg, Oral, Daily PRN **AND** bisacodyl (DULCOLAX) suppository 10 mg, 10 mg, Rectal, Daily PRN, Mary Garcia DO    Calcium Replacement - Follow Nurse / BPA Driven Protocol, , Does not apply, PRN, Mary Garcia DO    dorzolamide (TRUSOPT) 2 % 1 drop, timolol (TIMOPTIC) 0.5 % 1 drop for Cosopt 22.3-6.8 mg/mL, , Both Eyes, BID, Paulette Carcamo APRN, Given at 07/25/24 0852    folic acid (FOLVITE) tablet 1 mg, 1 mg, Oral, Daily, Mary Garcia DO, 1 mg at 07/25/24 0852    HYDROcodone-acetaminophen (NORCO) 5-325 MG per tablet 1 tablet, 1 tablet, Oral, Q6H PRN, Mary Garcia,     lactated ringers infusion, 9 mL/hr, Intravenous, Continuous, [facility] " Alex Alvarez MD, Last Rate: 9 mL/hr at 07/24/24 1331, Restarted at 07/24/24 1444    latanoprost (XALATAN) 0.005 % ophthalmic solution 1 drop, 1 drop, Both Eyes, Nightly, Paulette Carcamo, APRN, 1 drop at 07/24/24 2055    Magnesium Standard Dose Replacement - Follow Nurse / BPA Driven Protocol, , Does not apply, PRN, Mary Garcia,     nitroglycerin (NITROSTAT) SL tablet 0.4 mg, 0.4 mg, Sublingual, Q5 Min PRN, Mary Garcia,     ondansetron (ZOFRAN) injection 4 mg, 4 mg, Intravenous, Q6H PRN, Mary Garcia,     pantoprazole (PROTONIX) EC tablet 40 mg, 40 mg, Oral, Daily, Paulette Carcamo, APRN, 40 mg at 07/25/24 0851    Phosphorus Replacement - Follow Nurse / BPA Driven Protocol, , Does not apply, PRN, Mary Garcia, DO    Potassium Replacement - Follow Nurse / BPA Driven Protocol, , Does not apply, PRN, Mary Garcia, DO    predniSONE (DELTASONE) tablet 5 mg, 5 mg, Oral, Daily With Breakfast, Mary Garcia DO, 5 mg at 07/25/24 0852    sodium chloride 0.9 % flush 10 mL, 10 mL, Intravenous, Q12H, Mary aGrcia DO, 10 mL at 07/25/24 0901    sodium chloride 0.9 % flush 10 mL, 10 mL, Intravenous, PRN, Mary Garcia,     sodium chloride 0.9 % infusion 40 mL, 40 mL, Intravenous, PRN, Mary Garcia, DO    Laboratory Results:        Lab 07/25/24  0427   WBC 10.95*   HEMOGLOBIN 13.7   HEMATOCRIT 42.3   PLATELETS 252   NEUTROS ABS 8.66*   IMMATURE GRANS (ABS) 0.08*   LYMPHS ABS 1.07   MONOS ABS 1.13*   EOS ABS 0.00   MCV 92.6         Lab 07/25/24  0427   SODIUM 137   POTASSIUM 4.8   CHLORIDE 100   CO2 23.0   ANION GAP 14.0   BUN 20   CREATININE 0.89   EGFR 62.1   GLUCOSE 105*   CALCIUM 9.4   MAGNESIUM 2.3   PHOSPHORUS 3.7                         Brief Urine Lab Results  (Last result in the past 365 days)        Color   Clarity   Blood   Leuk Est   Nitrite   Protein   CREAT   Urine HCG        07/08/24 1814 Yellow    Clear   Negative   Trace   Negative   Negative                 Microbiology Results (last 10 days)       ** No results found for the last 240 hours. **                     Diagnostic Imaging: I personally reviewed and interpreted the new imaging    Assessment and plan:    1. Fracture of vertebra due to osteoporosis with delayed healing, subsequent encounter    2. 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 Horton.  Postoperatively, patient reports improvement in her low back pain.  She is remained neurologically stable and is able to move bilateral lower extremities equally.  Visiting friend states she does not have very much help at home in the evening/night.  Would defer to case management in regards to discharge planning.  Physical therapy has recommended skilled nursing facility at discharge. From a neurosurgical perspective, she can discharge whenever medically stable.  She will follow-up in the neurosurgery office in 2 to 3 weeks with PA.      Any copied data from previous notes included in the (1) HPI, (2) PE, (3) MDM and/or Assessment and Plan has been reviewed and accurate as of 07/25/24.    Elena Matta PA-C  07/25/24  11:23 EDT

## 2024-07-25 NOTE — PLAN OF CARE
Goal Outcome Evaluation:   Patient is alert and oriented. On oxygen 2 L / mt. Dressing intact at the back.Has incontinence of urine. Mobilized out of bed and sat on chair with PT.Vital signs within normal limits.

## 2024-07-25 NOTE — PROGRESS NOTES
[facility] Medicine Services  PROGRESS NOTE    Patient Name: Michelle Hoff  : 1935  MRN: [id number]    Date of Admission: 2024  Primary Care Physician: Alma Casanova MD    Subjective   Subjective     CC:  F/u back pain    HPI:  Patient resting in bed. No complaints this morning, says her pain is okay laying still, would like to go home today.      Objective   Objective     Vital Signs:   Temp:  [97.3 °F (36.3 °C)-98.8 °F (37.1 °C)] 98 °F (36.7 °C)  Heart Rate:  [] 59  Resp:  [16] 16  BP: (110-196)/(49-87) 132/58  Flow (L/min):  [2] 2     Physical Exam:  Constitutional: No acute distress, awake, alert, elderly female  HENT: NCAT, mucous membranes moist  Respiratory: Clear to auscultation bilaterally, respiratory effort normal   Cardiovascular: RRR, no murmurs, rubs, or gallops  Gastrointestinal: soft, nontender, nondistended  Musculoskeletal: No bilateral ankle edema  Psychiatric: Appropriate affect, cooperative  Neurologic: Oriented x 3, speech clear, no focal deficits  Skin: No rashes, thin/fragile skin with bruising      Results Reviewed:  LAB RESULTS:      Lab 24  0427   WBC 10.95*   HEMOGLOBIN 13.7   HEMATOCRIT 42.3   PLATELETS 252   NEUTROS ABS 8.66*   IMMATURE GRANS (ABS) 0.08*   LYMPHS ABS 1.07   MONOS ABS 1.13*   EOS ABS 0.00   MCV 92.6         Lab 24  0427   SODIUM 137   POTASSIUM 4.8   CHLORIDE 100   CO2 23.0   ANION GAP 14.0   BUN 20   CREATININE 0.89   EGFR 62.1   GLUCOSE 105*   CALCIUM 9.4   MAGNESIUM 2.3   PHOSPHORUS 3.7                         Brief Urine Lab Results  (Last result in the past 365 days)        Color   Clarity   Blood   Leuk Est   Nitrite   Protein   CREAT   Urine HCG        24 1814 Yellow   Clear   Negative   Trace   Negative   Negative                   Microbiology Results Abnormal       None            No radiology results from the last 24 hrs        Current medications:  Scheduled Meds:atenolol, 25 mg, Oral,  BID  dorzolamide (TRUSOPT) 2 % 1 drop, timolol (TIMOPTIC) 0.5 % 1 drop for Cosopt 22.3-6.8 mg/mL, , Both Eyes, BID  folic acid, 1 mg, Oral, Daily  latanoprost, 1 drop, Both Eyes, Nightly  pantoprazole, 40 mg, Oral, Daily  predniSONE, 5 mg, Oral, Daily With Breakfast  sodium chloride, 10 mL, Intravenous, Q12H      Continuous Infusions:lactated ringers, 9 mL/hr, Last Rate: 9 mL/hr (07/24/24 1331)      PRN Meds:.  acetaminophen    senna-docusate sodium **AND** polyethylene glycol **AND** bisacodyl **AND** bisacodyl    Calcium Replacement - Follow Nurse / BPA Driven Protocol    HYDROcodone-acetaminophen    Magnesium Standard Dose Replacement - Follow Nurse / BPA Driven Protocol    nitroglycerin    ondansetron    Phosphorus Replacement - Follow Nurse / BPA Driven Protocol    Potassium Replacement - Follow Nurse / BPA Driven Protocol    sodium chloride    sodium chloride    Assessment & Plan   Assessment & Plan     Active Hospital Problems    Diagnosis  POA    **Fracture of vertebra due to osteoporosis [M80.08XA]  Unknown    Lumbar compression fracture [S32.000A]  Yes    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        Brief Hospital Course to date:  Michelle Hoff is a 89 y.o. female  with history of RA, PMR, macular degeneration, HTN, here with back pain and found to have L4 compression fracture     Back pain secondary to L4 compression fx  -S/p L4 kyphoplasty on 7/24 with Dr Horton  - f/u with neurosurgery in 2-3 weeks with PA clinic  -pain control PRN  -PT/OT, pending rehab vs. Home with 24/7 caregivers     Afib  -not on anticoagulation  -continue atenolol     RA  PMR   -on MTX/steroids  -continue folate     Debility  -she does not want to go back to rehab, would like to return home with caregivers, however family feels she should go to SNF. CM following    Expected Discharge Location and Transportation: SNF vs. Home  w/caregivers  Expected Discharge   Expected Discharge Date: 7/26/2024; Expected Discharge Time:      VTE Prophylaxis:  Mechanical VTE prophylaxis orders are present.         AM-PAC 6 Clicks Score (PT): 13 (07/25/24 1046)    CODE STATUS:   Code Status and Medical Interventions: No CPR (Do Not Attempt to Resuscitate); Limited Support; No intubation (DNI)   Ordered at: 07/24/24 1544     Medical Intervention Limits:    No intubation (DNI)     Code Status (Patient has no pulse and is not breathing):    No CPR (Do Not Attempt to Resuscitate)     Medical Interventions (Patient has pulse or is breathing):    Limited Support       Yin Mohan, DO  07/25/24

## 2024-07-25 NOTE — CASE MANAGEMENT/SOCIAL WORK
Discharge Planning Assessment  [facility]     Patient Name: Michelle Hoff  MRN: [id number]  Today's Date: 7/25/2024    Admit Date: 7/24/2024    Plan: SNF   Discharge Needs Assessment       Row Name 07/25/24 0902       Living Environment    People in Home alone    Current Living Arrangements home    Potentially Unsafe Housing Conditions none    In the past 12 months has the electric, gas, oil, or water company threatened to shut off services in your home? No    Primary Care Provided by self    Provides Primary Care For no one    Family Caregiver if Needed child(colton), adult    Quality of Family Relationships involved;helpful    Able to Return to Prior Arrangements yes       Resource/Environmental Concerns    Resource/Environmental Concerns none    Transportation Concerns none       Transportation Needs    In the past 12 months, has lack of transportation kept you from medical appointments or from getting medications? no    In the past 12 months, has lack of transportation kept you from meetings, work, or from getting things needed for daily living? No       Food Insecurity    Within the past 12 months, you worried that your food would run out before you got the money to buy more. Never true    Within the past 12 months, the food you bought just didn't last and you didn't have money to get more. Never true       Transition Planning    Patient/Family Anticipates Transition to other (see comments)    Patient/Family Anticipated Services at Transition none    Transportation Anticipated family or friend will provide       Discharge Needs Assessment    Readmission Within the Last 30 Days planned readmission    Equipment Currently Used at Home walker, rolling    Concerns to be Addressed discharge planning    Anticipated Changes Related to Illness none    Equipment Needed After Discharge none    Outpatient/Agency/Support Group Needs skilled nursing facility                   Discharge Plan       Row Name 07/25/24 0859        Plan    Plan SNF    Patient/Family in Agreement with Plan yes    Plan Comments Pt has been at [facility] spinal cord unit prior to admit. Prior to that she was livinig alone in [facility]. She has a walker for mobility and had been independent with her ADLs. Pt is followed by her PCP and has drug coverage. [facility] had made referrals to skilled rehab at [place] and [place]. CM has updated both facilites of pts admit here. They are following for updated therapy notes. CM to follow    Final Discharge Disposition Code 03 - skilled nursing facility (SNF)                  Continued Care and Services - Admitted Since 7/24/2024    No active coordination exists for this encounter.       Selected Continued Care - Prior Encounters Includes continued care and service providers with selected services from prior encounters from 4/25/2024 to 7/25/2024      Discharged on 7/11/2024 Admission date: 7/8/2024 - Discharge disposition: Rehab Facility or Unit (DC - External)      Destination       Service Provider Selected Services Address Phone Fax Patient Preferred    [facility] [address] [id number] [phone] --                          Expected Discharge Date and Time       Expected Discharge Date Expected Discharge Time    Jul 26, 2024            Demographic Summary       Row Name 07/25/24 0901       General Information    Referral Source physician    Reason for Consult discharge planning    Preferred Language English    General Information Comments PCP Bridgett Casanova       Contact Information    Permission Granted to Share Info With family/designee    Contact Information Comments Sarah Cowden [phone]                   Functional Status       Row Name 07/25/24 0902       Functional Status    Usual Activity Tolerance good    Current Activity Tolerance good       Physical Activity    On average, how many days per week do you engage in moderate  to strenuous exercise (like a brisk walk)? 0 days    On average, how many minutes do you engage in exercise at this level? 0 min    Number of minutes of exercise per week 0       Functional Status, IADL    Medications independent    Meal Preparation independent    Housekeeping independent    Laundry independent    Shopping independent    IADL Comments pt has been at [facility] spinal cord unti prior to this admit. Previous to that she was living home alone       Mental Status    General Appearance WDL WDL       Mental Status Summary    Recent Changes in Mental Status/Cognitive Functioning no changes       Employment/    Employment Status retired    Current or Previous Occupation not applicable                   Psychosocial    No documentation.                  Abuse/Neglect    No documentation.                  Legal    No documentation.                  Substance Abuse    No documentation.                  Patient Forms    No documentation.                     Gabby Jackson RN

## 2024-07-25 NOTE — PLAN OF CARE
Goal Outcome Evaluation:  Plan of Care Reviewed With: patient           Outcome Evaluation: Pt. presents below baseline function w/generalized weakness, balance deficits, acute spinal precautions and acute pain affecting her ability to safely participate in functional mobility. She performed bed mobility, transfers and ambulated 3' w/front wheeled walker, min-mod assist of 2. Activity limited by pain. Pt. educated spinal precautions and log roll technique. Pt. would benefit from acute PT services to address stated deficits.      Anticipated Discharge Disposition (PT): skilled nursing facility

## 2024-07-26 VITALS
OXYGEN SATURATION: 99 % | BODY MASS INDEX: 24.84 KG/M2 | HEIGHT: 62 IN | TEMPERATURE: 98 F | SYSTOLIC BLOOD PRESSURE: 140 MMHG | RESPIRATION RATE: 18 BRPM | HEART RATE: 61 BPM | DIASTOLIC BLOOD PRESSURE: 60 MMHG | WEIGHT: 135 LBS

## 2024-07-26 PROBLEM — S32.000A LUMBAR COMPRESSION FRACTURE: Status: RESOLVED | Noted: 2024-07-24 | Resolved: 2024-07-26

## 2024-07-26 PROCEDURE — 99239 HOSP IP/OBS DSCHRG MGMT >30: CPT | Performed by: INTERNAL MEDICINE

## 2024-07-26 PROCEDURE — 63710000001 ACETAMINOPHEN 325 MG TABLET: Performed by: NURSE PRACTITIONER

## 2024-07-26 PROCEDURE — A9270 NON-COVERED ITEM OR SERVICE: HCPCS | Performed by: NURSE PRACTITIONER

## 2024-07-26 PROCEDURE — A9270 NON-COVERED ITEM OR SERVICE: HCPCS | Performed by: INTERNAL MEDICINE

## 2024-07-26 PROCEDURE — 63710000001 ATENOLOL 25 MG TABLET: Performed by: INTERNAL MEDICINE

## 2024-07-26 PROCEDURE — 63710000001 FOLIC ACID 1 MG TABLET: Performed by: INTERNAL MEDICINE

## 2024-07-26 PROCEDURE — 63710000001 PANTOPRAZOLE 40 MG TABLET DELAYED-RELEASE: Performed by: NURSE PRACTITIONER

## 2024-07-26 PROCEDURE — 63710000001 HYDROCODONE-ACETAMINOPHEN 5-325 MG TABLET: Performed by: INTERNAL MEDICINE

## 2024-07-26 PROCEDURE — 63710000001 PREDNISONE PER 5 MG: Performed by: INTERNAL MEDICINE

## 2024-07-26 RX ORDER — AMOXICILLIN 250 MG
1 CAPSULE ORAL DAILY
Start: 2024-07-26

## 2024-07-26 RX ORDER — HYDROCODONE BITARTRATE AND ACETAMINOPHEN 5; 325 MG/1; MG/1
1-2 TABLET ORAL EVERY 6 HOURS PRN
Qty: 20 TABLET | Refills: 0 | Status: SHIPPED | OUTPATIENT
Start: 2024-07-26 | End: 2024-07-31

## 2024-07-26 RX ADMIN — ATENOLOL 25 MG: 25 TABLET ORAL at 08:03

## 2024-07-26 RX ADMIN — HYDROCODONE BITARTRATE AND ACETAMINOPHEN 1 TABLET: 5; 325 TABLET ORAL at 00:50

## 2024-07-26 RX ADMIN — ACETAMINOPHEN 650 MG: 325 TABLET ORAL at 08:03

## 2024-07-26 RX ADMIN — PANTOPRAZOLE SODIUM 40 MG: 40 TABLET, DELAYED RELEASE ORAL at 08:03

## 2024-07-26 RX ADMIN — Medication 10 ML: at 08:05

## 2024-07-26 RX ADMIN — HYDROCODONE BITARTRATE AND ACETAMINOPHEN 1 TABLET: 5; 325 TABLET ORAL at 05:43

## 2024-07-26 RX ADMIN — PREDNISONE 5 MG: 5 TABLET ORAL at 08:03

## 2024-07-26 RX ADMIN — TIMOLOL MALEATE: 5 SOLUTION/ DROPS OPHTHALMIC at 08:04

## 2024-07-26 RX ADMIN — FOLIC ACID 1 MG: 1 TABLET ORAL at 08:03

## 2024-07-26 NOTE — DISCHARGE SUMMARY
[facility] Medicine Services  DISCHARGE SUMMARY    Patient Name: Michelle Hoff  : 1935  MRN: [id number]    Date of Admission: 2024 10:36 AM  Date of Discharge:  2024  Primary Care Physician: Alma Casanova MD    Consults       Date and Time Order Name Status Description    2024  8:51 PM Inpatient Neurosurgery Consult Completed             Hospital Course     Presenting Problem: lumbar compression fracture    Active Hospital Problems    Diagnosis  POA    Rheumatoid arthritis involving multiple sites [M06.9]  Yes    PMR (polymyalgia rheumatica) [M35.3]  Yes      Resolved Hospital Problems    Diagnosis Date Resolved POA    **Lumbar compression fracture [S32.000A] 2024 Yes          Hospital Course:  Michelle Hoff is a 89 y.o. female w h/o RA on chronic pred+MTX, AF not on anticoagulation, and PMR who presented for scheduled L4 kyphoplasty 2/2 intractable back pain from L4 compression fracture.    Completed kyphoplasty  w Dr Horton without complications. Attemped biopsy of T6 for pathological compression fracture which was unsuccessful. Recommend OP PCP osteoporosis w/u.     F/u with neurosurgery PA scheduled for 2-3 weeks. Found appropriate for SNF at discharge as appropriate      Discharge Follow Up Recommendations for outpatient labs/diagnostics:   F/u PCP 1 week from rehab dispo   F/u NSGY PA in 3 weeks    Day of Discharge     HPI:   Doing well today - some pain last night that improved w PO pain meds  Caregiver and daughter bedside - home med list reviewed and edited    Vital Signs:   Temp:  [97.6 °F (36.4 °C)-98 °F (36.7 °C)] 98 °F (36.7 °C)  Heart Rate:  [50-64] 61  Resp:  [16-18] 18  BP: (122-160)/(58-72) 140/60  Flow (L/min):  [2] 2      Physical Exam:  Constitutional: No acute distress, awake, alert  HENT: NCAT, mucous membranes moist  Respiratory: Clear to auscultation bilaterally, respiratory effort normal   Cardiovascular: RRR, no murmurs,  rubs, or gallops  Gastrointestinal: Soft, nontender, nondistended  Musculoskeletal: Muscle tone within normal limits, no joint effusions appreciated  Psychiatric: Appropriate affect, cooperative  Neurologic: Alert and oriented, facial movements symmetric and spontaneous movement of all 4 extremities grossly equal bilaterally, toe movement/leg movement bilaterally to command, speech clear  Skin: Superficial bruising significant throughout No rashes    Pertinent  and/or Most Recent Results     LAB RESULTS:      Lab 07/25/24  0427   WBC 10.95*   HEMOGLOBIN 13.7   HEMATOCRIT 42.3   PLATELETS 252   NEUTROS ABS 8.66*   IMMATURE GRANS (ABS) 0.08*   LYMPHS ABS 1.07   MONOS ABS 1.13*   EOS ABS 0.00   MCV 92.6         Lab 07/25/24  0427   SODIUM 137   POTASSIUM 4.8   CHLORIDE 100   CO2 23.0   ANION GAP 14.0   BUN 20   CREATININE 0.89   EGFR 62.1   GLUCOSE 105*   CALCIUM 9.4   MAGNESIUM 2.3   PHOSPHORUS 3.7                         Brief Urine Lab Results  (Last result in the past 365 days)        Color   Clarity   Blood   Leuk Est   Nitrite   Protein   CREAT   Urine HCG        07/08/24 1814 Yellow   Clear   Negative   Trace   Negative   Negative                 Microbiology Results (last 10 days)       ** No results found for the last 240 hours. **            No radiology results for the last 10 days                Plan for Follow-up of Pending Labs/Results:     Discharge Details        Discharge Medications        New Medications        Instructions Start Date   sennosides-docusate 8.6-50 MG per tablet  Commonly known as: PERICOLACE   1 tablet, Oral, Daily             Changes to Medications        Instructions Start Date   HYDROcodone-acetaminophen 5-325 MG per tablet  Commonly known as: NORCO  What changed: You were already taking a medication with the same name, and this prescription was added. Make sure you understand how and when to take each.   1-2 tablets, Oral, Every 6 Hours PRN      HYDROcodone-acetaminophen 5-325 MG  per tablet  Commonly known as: SANCHO  What changed: Another medication with the same name was added. Make sure you understand how and when to take each.   1 tablet, Oral, Every 6 Hours PRN             Continue These Medications        Instructions Start Date   acetaminophen 325 MG tablet  Commonly known as: TYLENOL   650 mg, Oral, Every 6 Hours PRN      atenolol 25 MG tablet  Commonly known as: TENORMIN   25 mg, Oral, 2 Times Daily      Cholecalciferol 50 MCG (2000 UT) capsule   Vitamin D3 50 mcg (2,000 unit) capsule   Take 1 capsule every day by oral route.      dorzolamide-timolol 2-0.5 % ophthalmic solution  Commonly known as: COSOPT   1 drop, Both Eyes, 2 Times Daily      estradiol 0.1 MG/GM vaginal cream  Commonly known as: ESTRACE   APPLY A PEA SIZED AMOUNT OF ESTRADIOL AROUND URETHRA ONCE AT BEDTIME      folic acid 1 MG tablet  Commonly known as: FOLVITE   1 mg, Oral, Daily      ketoconazole 2 % cream  Commonly known as: NIZORAL   APPLY TOPICALLY TO THE AFFECTED AREA EVERY DAY      latanoprost 0.005 % ophthalmic solution  Commonly known as: XALATAN   1 drop, Both Eyes, Nightly      methotrexate 2.5 MG tablet   7.5 mg, Oral, Weekly, q monday       pantoprazole 20 MG EC tablet  Commonly known as: PROTONIX   20 mg, Oral, Daily      predniSONE 2.5 MG tablet  Commonly known as: DELTASONE   5 mg, Oral, Daily               Allergies   Allergen Reactions    Augmentin [Amoxicillin-Pot Clavulanate] Diarrhea and Unknown - High Severity    Cardizem [Diltiazem Hcl] Other (See Comments)     unknown    Metoprolol Other (See Comments)     unknown    Adhesive Tape Rash     If tape is needed paper tape is better         Discharge Disposition:  Skilled Nursing Facility (DC - External)    Diet:  Hospital:  Diet Order   Procedures    Diet: Regular/House; Fluid Consistency: Thin (IDDSI 0)            Activity:  Activity Instructions       Discharge Activity      1) No driving while taking opioids  2) off work until follow-up in  clinic  3) deep dressing dry removed in 48 hours may shower do not bathe  4) Do not lift / push / pull more then 5 pounds-no lifting bending or twisting            Restrictions or Other Recommendations:         CODE STATUS:    Code Status and Medical Interventions: No CPR (Do Not Attempt to Resuscitate); Limited Support; No intubation (DNI)   Ordered at: 07/24/24 1544     Medical Intervention Limits:    No intubation (DNI)     Code Status (Patient has no pulse and is not breathing):    No CPR (Do Not Attempt to Resuscitate)     Medical Interventions (Patient has pulse or is breathing):    Limited Support       Future Appointments   Date Time Provider Department Center   7/26/2024  9:15 AM MED 7 [facility]   8/15/2024  2:30 PM Aquiles Wasserman PA-C [facility]       Additional Instructions for the Follow-ups that You Need to Schedule       Discharge Follow-up with Specified Provider: Aquiles Wasserman in neurosurgery clinic; 3 Weeks   As directed      To: Aquiles Wasserman in neurosurgery clinic   Follow Up: 3 Weeks   Follow Up Details: Please set up a phone visit with Aquiles Wasserman regarding postop or myself                      Sol Grant MD  07/26/24      Time Spent on Discharge:  I spent  35  minutes on this discharge activity which included: face-to-face encounter with the patient, reviewing the data in the system, coordination of the care with the nursing staff as well as consultants, documentation, and entering orders.  Review of med rec, d/w CM

## 2024-07-26 NOTE — PLAN OF CARE
Problem: Adult Inpatient Plan of Care  Goal: Plan of Care Review  Outcome: Ongoing, Progressing  Goal: Patient-Specific Goal (Individualized)  Outcome: Ongoing, Progressing  Goal: Absence of Hospital-Acquired Illness or Injury  Outcome: Ongoing, Progressing  Intervention: Identify and Manage Fall Risk  Recent Flowsheet Documentation  Taken 7/26/2024 0045 by Joleen Wright RN  Safety Promotion/Fall Prevention: safety round/check completed  Taken 7/25/2024 2245 by Joleen Wright RN  Safety Promotion/Fall Prevention:   activity supervised   assistive device/personal items within reach   clutter free environment maintained   elopement precautions   fall prevention program maintained   gait belt   lighting adjusted   mobility aid in reach   muscle strengthening facilitated   nonskid shoes/slippers when out of bed   room organization consistent   safety round/check completed   toileting scheduled  Intervention: Prevent Skin Injury  Recent Flowsheet Documentation  Taken 7/26/2024 0045 by Joleen Wright RN  Body Position: patient/family refused  Taken 7/25/2024 2245 by Joleen Wright RN  Body Position: patient/family refused  Skin Protection:   adhesive use limited   incontinence pads utilized   transparent dressing maintained   tubing/devices free from skin contact  Intervention: Prevent and Manage VTE (Venous Thromboembolism) Risk  Recent Flowsheet Documentation  Taken 7/25/2024 2245 by Joleen Wright RN  Activity Management: activity encouraged  VTE Prevention/Management: patient refused intervention  Range of Motion: ROM (range of motion) performed  Intervention: Prevent Infection  Recent Flowsheet Documentation  Taken 7/26/2024 0045 by Joleen Wright RN  Infection Prevention: rest/sleep promoted  Taken 7/25/2024 2245 by Joleen Wright RN  Infection Prevention:   cohorting utilized   environmental surveillance performed   equipment surfaces disinfected   hand hygiene promoted   personal protective  equipment utilized   rest/sleep promoted   single patient room provided  Goal: Optimal Comfort and Wellbeing  Outcome: Ongoing, Progressing  Intervention: Monitor Pain and Promote Comfort  Recent Flowsheet Documentation  Taken 7/25/2024 2245 by Joleen Wright RN  Pain Management Interventions: see MAR  Intervention: Provide Person-Centered Care  Recent Flowsheet Documentation  Taken 7/25/2024 2245 by Joleen Wright RN  Trust Relationship/Rapport:   care explained   choices provided   emotional support provided   empathic listening provided   questions answered   questions encouraged   reassurance provided   thoughts/feelings acknowledged  Goal: Readiness for Transition of Care  Outcome: Ongoing, Progressing     Problem: Skin Injury Risk Increased  Goal: Skin Health and Integrity  Outcome: Ongoing, Progressing  Intervention: Optimize Skin Protection  Recent Flowsheet Documentation  Taken 7/26/2024 0045 by Joleen Wright RN  Head of Bed (HOB) Positioning: HOB flat  Taken 7/25/2024 2245 by Joleen Wright RN  Pressure Reduction Techniques:   frequent weight shift encouraged   heels elevated off bed   positioned off wounds   pressure points protected   weight shift assistance provided  Head of Bed (HOB) Positioning: HOB flat  Pressure Reduction Devices:   positioning supports utilized   pressure-redistributing mattress utilized  Skin Protection:   adhesive use limited   incontinence pads utilized   transparent dressing maintained   tubing/devices free from skin contact     Problem: Fall Injury Risk  Goal: Absence of Fall and Fall-Related Injury  Outcome: Ongoing, Progressing  Intervention: Identify and Manage Contributors  Recent Flowsheet Documentation  Taken 7/26/2024 0045 by Joleen Wright RN  Medication Review/Management: medications reviewed  Taken 7/25/2024 2245 by Joleen Wright RN  Medication Review/Management: medications reviewed  Self-Care Promotion:   independence encouraged   BADL personal  objects within reach   BADL personal routines maintained   meal set-up provided   safe use of adaptive equipment encouraged  Intervention: Promote Injury-Free Environment  Recent Flowsheet Documentation  Taken 7/26/2024 0045 by Joleen Wright, RN  Safety Promotion/Fall Prevention: safety round/check completed  Taken 7/25/2024 2245 by Joleen Wright, RN  Safety Promotion/Fall Prevention:   activity supervised   assistive device/personal items within reach   clutter free environment maintained   elopement precautions   fall prevention program maintained   gait belt   lighting adjusted   mobility aid in reach   muscle strengthening facilitated   nonskid shoes/slippers when out of bed   room organization consistent   safety round/check completed   toileting scheduled

## 2024-07-29 ENCOUNTER — HOME HEALTH ADMISSION (OUTPATIENT)
Dept: HOME HEALTH SERVICES | Facility: HOME HEALTHCARE | Age: 89
End: 2024-07-29
Payer: MEDICARE

## (undated) DEVICE — 1010 S-DRAPE TOWEL DRAPE 10/BX: Brand: STERI-DRAPE™

## (undated) DEVICE — PK KYPHOPLASTY 10

## (undated) DEVICE — SPNG GZ WOVN 4X4IN 12PLY 10/BX STRL

## (undated) DEVICE — BONE TAMP KIT KEX152EB FF E2 15/2 OI: Brand: KYPHON EXPRESS II KYPHOPAK TRAY

## (undated) DEVICE — GLV SURG PREMIERPRO MIC LTX PF SZ8 BRN

## (undated) DEVICE — NDL SPINE 22GA 11/2IN BLK LF

## (undated) DEVICE — CEMENT GUN AND BONE FILLER CDS2A SISE 2: Brand: MEDTRONIC REUSABLE INSTRUMENTS

## (undated) DEVICE — NDL HYPO ECLPS SFTY 25G 1 1/2IN

## (undated) DEVICE — SURGUARD3 SAFETY HYPODERMIC NEEDLE: Brand: SURGUARD3

## (undated) DEVICE — GLV SURG BIOGEL LTX PF 8

## (undated) DEVICE — SYRINGE,CONTROL,LL,FINGER,GRIP: Brand: MEDLINE INDUSTRIES, INC.

## (undated) DEVICE — HDRST INTUB GENTLETOUCH SLOT 7IN RT

## (undated) DEVICE — STRIP,CLOSURE,WOUND,MEDI-STRIP,1/2X4: Brand: MEDLINE

## (undated) DEVICE — CEMENT CARTRIDGES CC02A CDS: Brand: KYPHON® CEMENT DELIVERY SYSTEM

## (undated) DEVICE — KIT KPE1004 KPHOPAK EXPRESS TRAY 15/2 AF: Brand: KYPHOPAK™ EXPRESS™ TRAY

## (undated) DEVICE — KT ACC BVL TP END OPEN 10G STRL

## (undated) DEVICE — SYRINGE A08E KIS INFLATION HP: Brand: KYPHON®  INFLATION SYRINGE

## (undated) DEVICE — DRAPE,TOWEL,LARGE,INVISISHIELD: Brand: MEDLINE

## (undated) DEVICE — GLV SURG RAD SENSICARE SHLD PF LF SZ8 STRL

## (undated) DEVICE — KT BIOP10G STRL

## (undated) DEVICE — ADAPT STD LUER FOR CONFIDENCE RESVR

## (undated) DEVICE — BONE BIOPSY DEVICE F07A TAPERED SIZE 2: Brand: MEDTRONIC REUSABLE INSTRUMENTS

## (undated) DEVICE — SYR CONTRL LUERLOK 10CC

## (undated) DEVICE — SYS VBS INFLAT 1/PU STRL

## (undated) DEVICE — BALN SYNFLATE VERTREBRAL 5ML 15X23.3MM MD

## (undated) DEVICE — DRLL ACC 10G STRL

## (undated) DEVICE — CVR HNDL LIGHT RIGID

## (undated) DEVICE — KT ACC DIA TP OPNEND 10G STRL

## (undated) DEVICE — CONTAINER,SPECIMEN,OR STERILE,4OZ: Brand: MEDLINE

## (undated) DEVICE — BNDG ELAS CO-FLEX SLF ADHR 2IN 5YD LF STRL

## (undated) DEVICE — NEEDLE, QUINCKE 22GX3.5": Brand: MEDLINE INDUSTRIES, INC.